# Patient Record
Sex: FEMALE | Race: WHITE | Employment: OTHER | URBAN - METROPOLITAN AREA
[De-identification: names, ages, dates, MRNs, and addresses within clinical notes are randomized per-mention and may not be internally consistent; named-entity substitution may affect disease eponyms.]

---

## 2017-09-15 ENCOUNTER — HOSPITAL ENCOUNTER (OUTPATIENT)
Dept: GENERAL RADIOLOGY | Age: 72
Discharge: HOME | End: 2017-09-15
Payer: MEDICARE

## 2017-09-15 DIAGNOSIS — J18.9 PNEUMONIA, ORGANISM UNSPECIFIED(486): ICD-10-CM

## 2017-09-15 PROCEDURE — 71020 XR CHEST PA LAT: CPT

## 2018-01-26 ENCOUNTER — OFFICE VISIT (OUTPATIENT)
Dept: ORTHOPEDIC SURGERY | Age: 73
End: 2018-01-26

## 2018-01-26 VITALS
OXYGEN SATURATION: 100 % | HEART RATE: 75 BPM | SYSTOLIC BLOOD PRESSURE: 127 MMHG | HEIGHT: 63 IN | BODY MASS INDEX: 34.02 KG/M2 | WEIGHT: 192 LBS | DIASTOLIC BLOOD PRESSURE: 77 MMHG | RESPIRATION RATE: 14 BRPM

## 2018-01-26 DIAGNOSIS — M17.11 PRIMARY OSTEOARTHRITIS OF RIGHT KNEE: Primary | ICD-10-CM

## 2018-01-26 DIAGNOSIS — M21.70 LEG LENGTH DISCREPANCY: ICD-10-CM

## 2018-01-26 RX ORDER — ASPIRIN 325 MG
325 TABLET ORAL DAILY
COMMUNITY
End: 2018-04-18

## 2018-01-26 NOTE — PROGRESS NOTES
Patient: Juan C Hernandez                MRN: [id number]       SSN: [id number]  YOB: 1945        AGE: 67 y.o. SEX: female  Body mass index is 34.01 kg/(m^2). PCP: Austen Sheehan MD  01/26/18    HISTORY: I had the pleasure of reviewing Familia Arnett. She is very friendly with one of my other patients, and she essentially has end-staged arthritis involving the right knee. She has known scoliosis and also a short left leg. We are going to get a 3/4-inch shoe lift for her. He is about one inch short, but we will correct it to within about 1/4-inch. She is complaining of right knee pain. She has pain at night and pain with activities of daily living. Yesterday, she really did not get out of the chair much because it was so painful for her. It was very difficult at [facility], and she is interested in having surgery. She is not interested in injections. The pain can be severe some days. She denies any trauma or falls. She denies myocardial infarction, stroke, diabetes, or respiratory problems. She is a very healthy lady. She has had a hysterectomy and hernia surgeries. PHYSICAL EXAMINATION:  On examination today, she stands in varus. She has about an inch leg length discrepancy being shorter on the left side. The hips rotate nicely. Both feet are we will plan. There is slight evidence of neuropathy distally. There are good pulses. She has no cyanosis or clubbing. Elisa's sign is negative. RADIOGRAPHS:  Review of the x-rays, AP, tunnel, lateral, and skyline, confirm very severe end-staged arthritis of the right knee. IMPRESSION:  My overall impression is end-staged arthritis of the right knee. PLAN:  I do not think injections are going to help her, and she does not want any. I would recommend surgery for her.   We had a lengthy discussion regarding risks and benefits, including but not limited to bleeding, infection, DVT, pulmonary embolism, anesthetic complications, blood loss requiring transfusion, pain, stiffness, and other complications including instability, chronic pain, arthrofibrosis, infection, and DVT. I think she will be a terrific patient. She is very motivated. I look forward to helping her. I would kindly ask for medical clearance. REVIEW OF SYSTEMS:      CON: negative for weight loss, fever  EYE: negative for double vision  ENT: negative for hoarseness  RS:   negative for Tb  GI:    negative for blood in stool  :  negative for blood in urine  Other systems reviewed and noted below. Past Medical History:   Diagnosis Date    Hypertension        History reviewed. No pertinent family history. Current Outpatient Prescriptions   Medication Sig Dispense Refill    aspirin (ASPIRIN) 325 mg tablet Take 325 mg by mouth daily.  DOCOSAHEXANOIC ACID/EPA (FISH OIL PO) Take  by mouth.  CALCIUM-VITAMIN D3 PO Take  by mouth.  MAGNESIUM CARBONATE PO Take  by mouth.  LISINOPRIL PO Take  by mouth. No Known Allergies    Past Surgical History:   Procedure Laterality Date    HX HYSTERECTOMY         Social History     Social History    Marital status:      Spouse name: N/A    Number of children: N/A    Years of education: N/A     Occupational History    Not on file. Social History Main Topics    Smoking status: Never Smoker    Smokeless tobacco: Never Used    Alcohol use 0.5 oz/week     1 Glasses of wine per week      Comment: occasionally    Drug use: No    Sexual activity: Yes     Partners: Male     Other Topics Concern    Not on file     Social History Narrative       Visit Vitals    /77 (BP 1 Location: Left arm, BP Patient Position: Sitting)    Pulse 75    Resp 14    Ht 5' 3\" (1.6 m)    Wt 192 lb (87.1 kg)    SpO2 100%    BMI 34.01 kg/m2         PHYSICAL EXAMINATION:  GENERAL: Alert and oriented x3, in no acute distress, well-developed, well-nourished, afebrile. HEART: No JVD.   EYES: No scleral icterus   NECK: No significant lymphadenopathy   LUNGS: No respiratory compromise or indrawing  ABDOMEN: Soft, non-tender, non-distended. Electronically signed by:  Samara Guerrier MD

## 2018-01-26 NOTE — MR AVS SNAPSHOT
[address] [place] [address], [address] [facility] 
[phone] Patient: Florence Amaro MRN: [id number] DHR:[birth year] Visit Information Date & Time Provider Department Dept. Phone Encounter #  
 1/26/2018  1:30 PM Billy Flores MD [facility] [place] [id number] Upcoming Health Maintenance Date Due Hepatitis C Screening 1945 DTaP/Tdap/Td series (1 - Tdap) 7/7/1966 FOBT Q 1 YEAR AGE 50-75 7/7/1995 ZOSTER VACCINE AGE 60> 5/7/2005 GLAUCOMA SCREENING Q2Y 7/7/2010 Pneumococcal 65+ Low/Medium Risk (1 of 2 - PCV13) 7/7/2010 MEDICARE YEARLY EXAM 7/7/2010 Influenza Age 5 to Adult 8/1/2017 BREAST CANCER SCRN MAMMOGRAM 12/29/2017 Allergies as of 1/26/2018  Review Complete On: 1/26/2018 By: Billy Flores MD  
 No Known Allergies Current Immunizations  Never Reviewed No immunizations on file. Not reviewed this visit You Were Diagnosed With   
  
 Codes Comments Primary osteoarthritis of right knee    -  Primary ICD-10-CM: M17.11 ICD-9-CM: 715.16 Leg length discrepancy     ICD-10-CM: M21.70 ICD-9-CM: 736.81 Vitals BP Pulse Resp Height(growth percentile) Weight(growth percentile) SpO2  
 127/77 (BP 1 Location: Left arm, BP Patient Position: Sitting) 75 14 5' 3\" (1.6 m) 192 lb (87.1 kg) 100% BMI OB Status Smoking Status 34.01 kg/m2 Postmenopausal Never Smoker Vitals History BMI and BSA Data Body Mass Index Body Surface Area 34.01 kg/m 2 1.97 m 2 Your Updated Medication List  
  
   
This list is accurate as of: 1/26/18  2:37 PM.  Always use your most recent med list.  
  
  
  
  
 aspirin 325 mg tablet Commonly known as:  ASPIRIN Take 325 mg by mouth daily. CALCIUM-VITAMIN D3 PO Take  by mouth. FISH OIL PO Take  by mouth. LISINOPRIL PO Take  by mouth. MAGNESIUM CARBONATE PO Take  by mouth. We Performed the Following AMB POC XRAY, KNEE; COMPLETE, 4+ VIEW [52302 CPT(R)] AMB SUPPLY ORDER [[id number] Custom] Comments:  
 Shoe lift for left foot 3/4 inch SCHEDULE SURGERY [EFC0166 Custom] Patient Instructions [address] Phone: [phone] [place]:  
[address] [place], [address] [place]: 
[address] [place], [address] [place]: 
[address] [facility], [address] Introducing [facility]! Davi Moyer introduces [employer] patient portal. Now you can access parts of your medical record, email your doctor's office, and request medication refills online. 1. In your internet browser, go to [email or web address] 2. Click on the First Time User? Click Here link in the Sign In box. You will see the New Member Sign Up page. 3. Enter your [employer] Access Code exactly as it appears below. You will not need to use this code after youve completed the sign-up process. If you do not sign up before the expiration date, you must request a new code. · [employer] Access Code: [email or web address] Expires: 4/26/2018  2:37 PM 
 
4. Enter the last four digits of your Social Security Number (xxxx) and Date of Birth (mm/dd/yyyy) as indicated and click Submit. You will be taken to the next sign-up page. 5. Create a [employer] ID. This will be your [employer] login ID and cannot be changed, so think of one that is secure and easy to remember. 6. Create a [employer] password. You can change your password at any time. 7. Enter your Password Reset Question and Answer. This can be used at a later time if you forget your password. 8. Enter your e-mail address. You will receive e-mail notification when new information is available in [address] 9. Click Sign Up. You can now view and download portions of your medical record. 10. Click the Download Summary menu link to download a portable copy of your medical information. If you have questions, please visit the Frequently Asked Questions section of the [employer] website. Remember, [employer] is NOT to be used for urgent needs. For medical emergencies, dial 911. Now available from your iPhone and Android! Please provide this summary of care documentation to your next provider. Your primary care clinician is listed as Michel Zuniga. If you have any questions after today's visit, please call [phone].

## 2018-01-26 NOTE — PATIENT INSTRUCTIONS
[address]  Phone: [phone]  [place]:   [address]  [facility], [address] [place]:  [address] Vance Seller [address] [place], [facility]3] [address]    [place]:  [facility].    [place], [address]

## 2018-03-14 ENCOUNTER — TELEPHONE (OUTPATIENT)
Dept: ORTHOPEDIC SURGERY | Age: 73
End: 2018-03-14

## 2018-03-14 NOTE — TELEPHONE ENCOUNTER
Patient is requesting a letter for a handicap sign.  Please advise patient when ready for pick-up at [facility] at [phone] or [phone]

## 2018-03-16 ENCOUNTER — TELEPHONE (OUTPATIENT)
Dept: ORTHOPEDIC SURGERY | Age: 73
End: 2018-03-16

## 2018-03-16 NOTE — TELEPHONE ENCOUNTER
Unsure of what type of knee replacement her fried has however knee replacement come in different sizes.

## 2018-03-16 NOTE — TELEPHONE ENCOUNTER
Patient is scheduled for sx in April and states that she had a friend that had a knee replacement and had a lot of trouble with it. She states she was advised the the knee can come in different sizes and to ask if we used \"a smaller implant\". Please advise.

## 2018-03-19 NOTE — TELEPHONE ENCOUNTER
Patient stated that her friend had a knee replacement and they had to go back and replace it with a smaller size. She was just concerned if she needed a smaller size if we had it.

## 2018-03-28 DIAGNOSIS — Z01.818 PREOP EXAMINATION: Primary | ICD-10-CM

## 2018-03-28 DIAGNOSIS — M17.11 PRIMARY OSTEOARTHRITIS OF RIGHT KNEE: ICD-10-CM

## 2018-04-04 NOTE — PROGRESS NOTES
Hubert Leon attended the Joint Replacement Pre-Operative class on 4/4/18. Topics discussed included surgery preparation, what to expect the day of surgery, medications, physical and occupational therapy, and discharge planning. It was discussed that this is considered an elective surgery and that prior to the surgery they need to make decisions such as arranging for help at home once they are discharged. She was given a knee patient education notebook to take home. Opportunity was given to ask questions and phone number of the Orthopaedic Nurse Clinician was given for any questions or concerns that may arise later.

## 2018-04-05 ENCOUNTER — HOSPITAL ENCOUNTER (OUTPATIENT)
Dept: GENERAL RADIOLOGY | Age: 73
Discharge: HOME | End: 2018-04-05
Payer: MEDICARE

## 2018-04-05 ENCOUNTER — HOSPITAL ENCOUNTER (OUTPATIENT)
Dept: PREADMISSION TESTING | Age: 73
Discharge: HOME | End: 2018-04-05
Payer: MEDICARE

## 2018-04-05 DIAGNOSIS — Z01.818 PREOP EXAMINATION: ICD-10-CM

## 2018-04-05 DIAGNOSIS — M17.11 PRIMARY OSTEOARTHRITIS OF RIGHT KNEE: ICD-10-CM

## 2018-04-05 LAB
ABO + RH BLD: NORMAL
ALBUMIN SERPL-MCNC: 4.3 G/DL (ref 3.4–5)
ANION GAP SERPL CALC-SCNC: 9 MMOL/L (ref 3–18)
APPEARANCE UR: CLEAR
APTT PPP: 31.9 SEC (ref 23–36.4)
BASOPHILS # BLD: 0 K/UL (ref 0–0.1)
BASOPHILS NFR BLD: 0 % (ref 0–2)
BILIRUB UR QL: NEGATIVE
BLOOD GROUP ANTIBODIES SERPL: NORMAL
BUN SERPL-MCNC: 11 MG/DL (ref 7–18)
BUN/CREAT SERPL: 20 (ref 12–20)
CALCIUM SERPL-MCNC: 9.1 MG/DL (ref 8.5–10.1)
CHLORIDE SERPL-SCNC: 96 MMOL/L (ref 100–108)
CO2 SERPL-SCNC: 29 MMOL/L (ref 21–32)
COLOR UR: YELLOW
CREAT SERPL-MCNC: 0.56 MG/DL (ref 0.6–1.3)
DIFFERENTIAL METHOD BLD: ABNORMAL
EOSINOPHIL # BLD: 0.1 K/UL (ref 0–0.4)
EOSINOPHIL NFR BLD: 2 % (ref 0–5)
ERYTHROCYTE [DISTWIDTH] IN BLOOD BY AUTOMATED COUNT: 13 % (ref 11.6–14.5)
EST. AVERAGE GLUCOSE BLD GHB EST-MCNC: 105 MG/DL
GLUCOSE SERPL-MCNC: 101 MG/DL (ref 74–99)
GLUCOSE UR STRIP.AUTO-MCNC: NEGATIVE MG/DL
HBA1C MFR BLD: 5.3 % (ref 4.2–5.6)
HCT VFR BLD AUTO: 43.6 % (ref 35–45)
HGB BLD-MCNC: 14.5 G/DL (ref 12–16)
HGB UR QL STRIP: NEGATIVE
INR PPP: 1 (ref 0.8–1.2)
KETONES UR QL STRIP.AUTO: ABNORMAL MG/DL
LEUKOCYTE ESTERASE UR QL STRIP.AUTO: NEGATIVE
LYMPHOCYTES # BLD: 1.4 K/UL (ref 0.9–3.6)
LYMPHOCYTES NFR BLD: 27 % (ref 21–52)
MCH RBC QN AUTO: 28.5 PG (ref 24–34)
MCHC RBC AUTO-ENTMCNC: 33.3 G/DL (ref 31–37)
MCV RBC AUTO: 85.8 FL (ref 74–97)
MONOCYTES # BLD: 0.7 K/UL (ref 0.05–1.2)
MONOCYTES NFR BLD: 13 % (ref 3–10)
NEUTS SEG # BLD: 2.9 K/UL (ref 1.8–8)
NEUTS SEG NFR BLD: 58 % (ref 40–73)
NITRITE UR QL STRIP.AUTO: NEGATIVE
PH UR STRIP: 5 [PH] (ref 5–8)
PLATELET # BLD AUTO: 245 K/UL (ref 135–420)
PMV BLD AUTO: 9.4 FL (ref 9.2–11.8)
POTASSIUM SERPL-SCNC: 3.8 MMOL/L (ref 3.5–5.5)
PROT UR STRIP-MCNC: NEGATIVE MG/DL
PROTHROMBIN TIME: 12.4 SEC (ref 11.5–15.2)
RBC # BLD AUTO: 5.08 M/UL (ref 4.2–5.3)
SODIUM SERPL-SCNC: 134 MMOL/L (ref 136–145)
SP GR UR REFRACTOMETRY: 1.01 (ref 1–1.03)
SPECIMEN EXP DATE BLD: NORMAL
UROBILINOGEN UR QL STRIP.AUTO: 0.2 EU/DL (ref 0.2–1)
WBC # BLD AUTO: 5.1 K/UL (ref 4.6–13.2)

## 2018-04-05 PROCEDURE — 83036 HEMOGLOBIN GLYCOSYLATED A1C: CPT | Performed by: PHYSICIAN ASSISTANT

## 2018-04-05 PROCEDURE — 80048 BASIC METABOLIC PNL TOTAL CA: CPT | Performed by: PHYSICIAN ASSISTANT

## 2018-04-05 PROCEDURE — 86900 BLOOD TYPING SEROLOGIC ABO: CPT | Performed by: PHYSICIAN ASSISTANT

## 2018-04-05 PROCEDURE — 85025 COMPLETE CBC W/AUTO DIFF WBC: CPT | Performed by: PHYSICIAN ASSISTANT

## 2018-04-05 PROCEDURE — 82040 ASSAY OF SERUM ALBUMIN: CPT | Performed by: PHYSICIAN ASSISTANT

## 2018-04-05 PROCEDURE — 85610 PROTHROMBIN TIME: CPT | Performed by: PHYSICIAN ASSISTANT

## 2018-04-05 PROCEDURE — 81003 URINALYSIS AUTO W/O SCOPE: CPT | Performed by: PHYSICIAN ASSISTANT

## 2018-04-05 PROCEDURE — 93005 ELECTROCARDIOGRAM TRACING: CPT

## 2018-04-05 PROCEDURE — 71046 X-RAY EXAM CHEST 2 VIEWS: CPT

## 2018-04-05 PROCEDURE — 87086 URINE CULTURE/COLONY COUNT: CPT | Performed by: PHYSICIAN ASSISTANT

## 2018-04-05 PROCEDURE — 36415 COLL VENOUS BLD VENIPUNCTURE: CPT | Performed by: PHYSICIAN ASSISTANT

## 2018-04-05 PROCEDURE — 85730 THROMBOPLASTIN TIME PARTIAL: CPT | Performed by: PHYSICIAN ASSISTANT

## 2018-04-06 LAB
ATRIAL RATE: 79 BPM
BACTERIA SPEC CULT: NORMAL
CALCULATED P AXIS, ECG09: 31 DEGREES
CALCULATED R AXIS, ECG10: 43 DEGREES
CALCULATED T AXIS, ECG11: 36 DEGREES
DIAGNOSIS, 93000: NORMAL
P-R INTERVAL, ECG05: 122 MS
Q-T INTERVAL, ECG07: 348 MS
QRS DURATION, ECG06: 68 MS
QTC CALCULATION (BEZET), ECG08: 399 MS
SERVICE CMNT-IMP: NORMAL
VENTRICULAR RATE, ECG03: 79 BPM

## 2018-04-10 ENCOUNTER — OFFICE VISIT (OUTPATIENT)
Dept: ORTHOPEDIC SURGERY | Facility: CLINIC | Age: 73
End: 2018-04-10

## 2018-04-10 DIAGNOSIS — M25.561 RIGHT KNEE PAIN, UNSPECIFIED CHRONICITY: Primary | ICD-10-CM

## 2018-04-11 ENCOUNTER — OFFICE VISIT (OUTPATIENT)
Dept: ORTHOPEDIC SURGERY | Facility: CLINIC | Age: 73
End: 2018-04-11

## 2018-04-11 VITALS
HEART RATE: 78 BPM | SYSTOLIC BLOOD PRESSURE: 133 MMHG | TEMPERATURE: 97.1 F | OXYGEN SATURATION: 99 % | HEIGHT: 63 IN | WEIGHT: 125 LBS | RESPIRATION RATE: 16 BRPM | BODY MASS INDEX: 22.15 KG/M2 | DIASTOLIC BLOOD PRESSURE: 77 MMHG

## 2018-04-11 DIAGNOSIS — M17.11 PRIMARY OSTEOARTHRITIS OF RIGHT KNEE: Primary | ICD-10-CM

## 2018-04-11 RX ORDER — ACETAMINOPHEN 325 MG/1
1000 TABLET ORAL ONCE
Status: CANCELLED | OUTPATIENT
Start: 2018-04-11 | End: 2018-04-11

## 2018-04-11 RX ORDER — WARFARIN 1 MG/1
10 TABLET ORAL ONCE
Status: CANCELLED | OUTPATIENT
Start: 2018-04-11 | End: 2018-04-11

## 2018-04-11 RX ORDER — CELECOXIB 100 MG/1
200 CAPSULE ORAL ONCE
Status: CANCELLED | OUTPATIENT
Start: 2018-04-11 | End: 2018-04-11

## 2018-04-11 RX ORDER — PREGABALIN 25 MG/1
50 CAPSULE ORAL ONCE
Status: CANCELLED | OUTPATIENT
Start: 2018-04-11 | End: 2018-04-11

## 2018-04-11 NOTE — PATIENT INSTRUCTIONS
Patient: Brittany Duarte                MRN: [id number]       SSN: [id number]  YOB: 1945        AGE: 67 y.o. SEX: female  Body mass index is 22.14 kg/(m^2). 04/11/18    DO:  1:  Sit with the leg out straight 5-10 min every hour while awake. Keep the toes pointed       up towards the anthony. 2.  Bend your knee 5-10 min every hour. Bend it to the point of pain and hold it for 5-10       Min. 3.  ICE your knee 20 min every hour    DO NOT:    1. Do not place anything under your knee to prop it up  2. Do not sit in the recliner chair.

## 2018-04-11 NOTE — H&P
[address] [facility], [address] [facility]  [phone]           HISTORY & PHYSICAL      Patient: Nichol Irvin                MRN: [id number]       SSN: [id number]  YOB: 1945        AGE: 67 y.o. SEX: female  Body mass index is 22.14 kg/(m^2). PCP: Yenni Walker MD  04/11/18      CC: right knee end stage OA  Problem List Items Addressed This Visit     None      Visit Diagnoses     Primary osteoarthritis of right knee    -  Primary            HPI:  The patient is a pleasant 67 y.o. whom has end stage OA of their Right knee and has failed conservative treatment including but not limited to NSAIDS, cortisone injections, viscosupplementation, PT, and pain medicine. Due to the current findings and affected activities of daily living, surgical intervention is indicated. The alternatives, risks, complications, as well as expected outcome were discussed. These include but are not limited to infection, blood loss, need for blood transfusion, neurovascular damage, DVT, PE,  post-op stiffness and pain, leg length discrepancy, dislocation, anesthetic complications, prothesis longevity, need for more surgery, MI, stroke, and even death. The patient understands and wishes to proceed with surgery. Past Medical History:   Diagnosis Date    Hypertension          Current Outpatient Prescriptions:     aspirin (ASPIRIN) 325 mg tablet, Take 325 mg by mouth daily. , Disp: , Rfl:     CALCIUM-VITAMIN D3 PO, Take  by mouth., Disp: , Rfl:     MAGNESIUM CARBONATE PO, Take  by mouth., Disp: , Rfl:     LISINOPRIL PO, Take  by mouth., Disp: , Rfl:     DOCOSAHEXANOIC ACID/EPA (FISH OIL PO), Take  by mouth., Disp: , Rfl:     No Known Allergies    Social History     Social History    Marital status:      Spouse name: N/A    Number of children: N/A    Years of education: N/A     Occupational History    Not on file.      Social History Main Topics    Smoking status: Never Smoker    Smokeless tobacco: Never Used    Alcohol use 0.5 oz/week     1 Glasses of wine per week      Comment: occasionally    Drug use: No    Sexual activity: Yes     Partners: Male     Other Topics Concern    Not on file     Social History Narrative       Past Surgical History:   Procedure Laterality Date    HX HYSTERECTOMY         Family History:  Non-contributory. PE:  Visit Vitals    /77    Pulse 78    Temp 97.1 °F (36.2 °C)    Resp 16    Ht 5' 3\" (1.6 m)    Wt 125 lb (56.7 kg)    SpO2 99%    BMI 22.14 kg/m2     A&O X3, NAD, well develop, well nourished  Heart: S1-S2, rrr  Lungs: CTA bilat  Abd: soft, nt, nt, + bs in all quadrants  Ext:  Pos distal pulses to DP, PT      X-ray: right knee shows end stage OA    Labs: labs were reviewed and wnl.  ua neg    A:  Right  knee end stage OA    P:  At this point we will move forward with surgery. Again, the alternatives, risks, complications, as well as expected outcome were discussed and the patient wishes to proceed with surgery. Pt has been instructed to stop aspirin, nsaids, rheumatologic medications and blood thinners. They have also been instructed to continue on any heart and bp meds and to take them the morning of surgery with sips of water. The patient will require in patient admission due to above stated medical conditions as well the the surgical challenges given the anatomy and bone quality.          Nara Godfrey

## 2018-04-12 ENCOUNTER — HOSPITAL ENCOUNTER (OUTPATIENT)
Dept: NON INVASIVE DIAGNOSTICS | Age: 73
Discharge: HOME | End: 2018-04-12
Attending: INTERNAL MEDICINE
Payer: MEDICARE

## 2018-04-12 DIAGNOSIS — I51.0 ACQUIRED CARDIAC SEPTAL DEFECT: ICD-10-CM

## 2018-04-12 PROCEDURE — 93306 TTE W/DOPPLER COMPLETE: CPT

## 2018-04-12 RX ORDER — BISMUTH SUBSALICYLATE 262 MG
1 TABLET,CHEWABLE ORAL DAILY
COMMUNITY
End: 2019-09-05

## 2018-04-16 ENCOUNTER — ANESTHESIA EVENT (OUTPATIENT)
Dept: SURGERY | Age: 73
DRG: 470 | End: 2018-04-16
Payer: MEDICARE

## 2018-04-17 ENCOUNTER — ANESTHESIA (OUTPATIENT)
Dept: SURGERY | Age: 73
DRG: 470 | End: 2018-04-17
Payer: MEDICARE

## 2018-04-17 ENCOUNTER — APPOINTMENT (OUTPATIENT)
Dept: GENERAL RADIOLOGY | Age: 73
DRG: 470 | End: 2018-04-17
Attending: ORTHOPAEDIC SURGERY
Payer: MEDICARE

## 2018-04-17 ENCOUNTER — HOSPITAL ENCOUNTER (INPATIENT)
Age: 73
LOS: 1 days | Discharge: HOME HEALTH | DRG: 470 | End: 2018-04-18
Attending: ORTHOPAEDIC SURGERY | Admitting: ORTHOPAEDIC SURGERY
Payer: MEDICARE

## 2018-04-17 DIAGNOSIS — M17.10 ARTHRITIS OF KNEE: Primary | ICD-10-CM

## 2018-04-17 LAB — GLUCOSE BLD STRIP.AUTO-MCNC: 101 MG/DL (ref 70–110)

## 2018-04-17 PROCEDURE — 77030008683 HC TU ET CUF COVD -A: Performed by: ANESTHESIOLOGY

## 2018-04-17 PROCEDURE — 74011250636 HC RX REV CODE- 250/636: Performed by: PHYSICIAN ASSISTANT

## 2018-04-17 PROCEDURE — 77030029494 HC CLD THER UNIT ICMN DJOR -B: Performed by: ORTHOPAEDIC SURGERY

## 2018-04-17 PROCEDURE — 74011000250 HC RX REV CODE- 250: Performed by: ORTHOPAEDIC SURGERY

## 2018-04-17 PROCEDURE — 77030011640 HC PAD GRND REM COVD -A: Performed by: ORTHOPAEDIC SURGERY

## 2018-04-17 PROCEDURE — 74011000250 HC RX REV CODE- 250: Performed by: NURSE ANESTHETIST, CERTIFIED REGISTERED

## 2018-04-17 PROCEDURE — 74011250637 HC RX REV CODE- 250/637

## 2018-04-17 PROCEDURE — 77030002933 HC SUT MCRYL J&J -A: Performed by: ORTHOPAEDIC SURGERY

## 2018-04-17 PROCEDURE — 77030018883 HC BLD SAW SAG4 STRY -B: Performed by: ORTHOPAEDIC SURGERY

## 2018-04-17 PROCEDURE — 77030013708 HC HNDPC SUC IRR PULS STRY –B: Performed by: ORTHOPAEDIC SURGERY

## 2018-04-17 PROCEDURE — 77030016544 HC BLD SAW RECIP1 STRY -B: Performed by: ORTHOPAEDIC SURGERY

## 2018-04-17 PROCEDURE — 74011250637 HC RX REV CODE- 250/637: Performed by: NURSE ANESTHETIST, CERTIFIED REGISTERED

## 2018-04-17 PROCEDURE — 77030019605: Performed by: ORTHOPAEDIC SURGERY

## 2018-04-17 PROCEDURE — 77030020753 HC CUF TRNQT 1BLA STRY -B: Performed by: ORTHOPAEDIC SURGERY

## 2018-04-17 PROCEDURE — 97116 GAIT TRAINING THERAPY: CPT

## 2018-04-17 PROCEDURE — 74011250636 HC RX REV CODE- 250/636

## 2018-04-17 PROCEDURE — 64450 NJX AA&/STRD OTHER PN/BRANCH: CPT | Performed by: ANESTHESIOLOGY

## 2018-04-17 PROCEDURE — 77030031139 HC SUT VCRL2 J&J -A: Performed by: ORTHOPAEDIC SURGERY

## 2018-04-17 PROCEDURE — 76210000017 HC OR PH I REC 1.5 TO 2 HR: Performed by: ORTHOPAEDIC SURGERY

## 2018-04-17 PROCEDURE — 97161 PT EVAL LOW COMPLEX 20 MIN: CPT

## 2018-04-17 PROCEDURE — 74011250637 HC RX REV CODE- 250/637: Performed by: PHYSICIAN ASSISTANT

## 2018-04-17 PROCEDURE — 74011250636 HC RX REV CODE- 250/636: Performed by: ORTHOPAEDIC SURGERY

## 2018-04-17 PROCEDURE — 74011000258 HC RX REV CODE- 258

## 2018-04-17 PROCEDURE — 77030026438 HC STYL ET INTUB CARD -A: Performed by: ANESTHESIOLOGY

## 2018-04-17 PROCEDURE — 74011000250 HC RX REV CODE- 250

## 2018-04-17 PROCEDURE — 0SRC0J9 REPLACEMENT OF RIGHT KNEE JOINT WITH SYNTHETIC SUBSTITUTE, CEMENTED, OPEN APPROACH: ICD-10-PCS | Performed by: ORTHOPAEDIC SURGERY

## 2018-04-17 PROCEDURE — 76010000153 HC OR TIME 1.5 TO 2 HR: Performed by: ORTHOPAEDIC SURGERY

## 2018-04-17 PROCEDURE — 74011250636 HC RX REV CODE- 250/636: Performed by: NURSE ANESTHETIST, CERTIFIED REGISTERED

## 2018-04-17 PROCEDURE — 77030003601 HC NDL NRV BLK BBMI -A: Performed by: ORTHOPAEDIC SURGERY

## 2018-04-17 PROCEDURE — 77030020782 HC GWN BAIR PAWS FLX 3M -B: Performed by: ORTHOPAEDIC SURGERY

## 2018-04-17 PROCEDURE — 82962 GLUCOSE BLOOD TEST: CPT

## 2018-04-17 PROCEDURE — 77030012411 HC DRN WND CARD -A: Performed by: ORTHOPAEDIC SURGERY

## 2018-04-17 PROCEDURE — C1713 ANCHOR/SCREW BN/BN,TIS/BN: HCPCS | Performed by: ORTHOPAEDIC SURGERY

## 2018-04-17 PROCEDURE — C1776 JOINT DEVICE (IMPLANTABLE): HCPCS | Performed by: ORTHOPAEDIC SURGERY

## 2018-04-17 PROCEDURE — 76942 ECHO GUIDE FOR BIOPSY: CPT | Performed by: ANESTHESIOLOGY

## 2018-04-17 PROCEDURE — 76060000034 HC ANESTHESIA 1.5 TO 2 HR: Performed by: ORTHOPAEDIC SURGERY

## 2018-04-17 PROCEDURE — 77030012935 HC DRSG AQUACEL BMS -B: Performed by: ORTHOPAEDIC SURGERY

## 2018-04-17 PROCEDURE — 77030019557 HC ELECTRD VES SEAL MEDT -F: Performed by: ORTHOPAEDIC SURGERY

## 2018-04-17 PROCEDURE — 73560 X-RAY EXAM OF KNEE 1 OR 2: CPT

## 2018-04-17 PROCEDURE — 74011250637 HC RX REV CODE- 250/637: Performed by: ORTHOPAEDIC SURGERY

## 2018-04-17 PROCEDURE — 77030010785: Performed by: ORTHOPAEDIC SURGERY

## 2018-04-17 PROCEDURE — 77030018719 HC DRSG PTCH ANTIMIC J&J -A: Performed by: ORTHOPAEDIC SURGERY

## 2018-04-17 PROCEDURE — 77030008467 HC STPLR SKN COVD -B: Performed by: ORTHOPAEDIC SURGERY

## 2018-04-17 PROCEDURE — 77030016547 HC BLD SAW SAG1 STRY -B: Performed by: ORTHOPAEDIC SURGERY

## 2018-04-17 PROCEDURE — C9290 INJ, BUPIVACAINE LIPOSOME: HCPCS | Performed by: PHYSICIAN ASSISTANT

## 2018-04-17 PROCEDURE — 65270000029 HC RM PRIVATE

## 2018-04-17 PROCEDURE — 97530 THERAPEUTIC ACTIVITIES: CPT

## 2018-04-17 PROCEDURE — 74011000258 HC RX REV CODE- 258: Performed by: ORTHOPAEDIC SURGERY

## 2018-04-17 PROCEDURE — 3E0T3BZ INTRODUCTION OF ANESTHETIC AGENT INTO PERIPHERAL NERVES AND PLEXI, PERCUTANEOUS APPROACH: ICD-10-PCS | Performed by: ANESTHESIOLOGY

## 2018-04-17 PROCEDURE — 77030003029 HC SUT VCRL J&J -B: Performed by: ORTHOPAEDIC SURGERY

## 2018-04-17 PROCEDURE — 77030018836 HC SOL IRR NACL ICUM -A: Performed by: ORTHOPAEDIC SURGERY

## 2018-04-17 DEVICE — BASEPLT TIB UNIV TRIATHLN 3 --: Type: IMPLANTABLE DEVICE | Site: KNEE | Status: FUNCTIONAL

## 2018-04-17 DEVICE — COMPONENT KNEE CEM X3 TRIATHLON: Type: IMPLANTABLE DEVICE | Site: KNEE | Status: FUNCTIONAL

## 2018-04-17 DEVICE — PAT ASYM TRITHLON X3 32X10MM -- TRIATHLON ASYMMETRIC X3: Type: IMPLANTABLE DEVICE | Site: KNEE | Status: FUNCTIONAL

## 2018-04-17 DEVICE — COMPNT FEM PS CEM TRIATHLN 3 R --: Type: IMPLANTABLE DEVICE | Site: KNEE | Status: FUNCTIONAL

## 2018-04-17 DEVICE — INSERT TIB SZ 3 THK9MM KNEE X3 TOT STBL + TRIATHLON: Type: IMPLANTABLE DEVICE | Site: KNEE | Status: FUNCTIONAL

## 2018-04-17 DEVICE — CEMENT BNE 20ML 41GM FULL DOSE PMMA W/ TOBRA M VISC RADPQ: Type: IMPLANTABLE DEVICE | Site: KNEE | Status: FUNCTIONAL

## 2018-04-17 RX ORDER — LANOLIN ALCOHOL/MO/W.PET/CERES
1 CREAM (GRAM) TOPICAL 2 TIMES DAILY WITH MEALS
Status: DISCONTINUED | OUTPATIENT
Start: 2018-04-17 | End: 2018-04-18 | Stop reason: HOSPADM

## 2018-04-17 RX ORDER — SODIUM CHLORIDE, SODIUM LACTATE, POTASSIUM CHLORIDE, CALCIUM CHLORIDE 600; 310; 30; 20 MG/100ML; MG/100ML; MG/100ML; MG/100ML
INJECTION, SOLUTION INTRAVENOUS
Status: DISCONTINUED | OUTPATIENT
Start: 2018-04-17 | End: 2018-04-17 | Stop reason: HOSPADM

## 2018-04-17 RX ORDER — FAMOTIDINE 20 MG/1
20 TABLET, FILM COATED ORAL ONCE
Status: COMPLETED | OUTPATIENT
Start: 2018-04-17 | End: 2018-04-17

## 2018-04-17 RX ORDER — PREGABALIN 25 MG/1
25 CAPSULE ORAL 2 TIMES DAILY
Status: DISCONTINUED | OUTPATIENT
Start: 2018-04-17 | End: 2018-04-17

## 2018-04-17 RX ORDER — SCOLOPAMINE TRANSDERMAL SYSTEM 1 MG/1
PATCH, EXTENDED RELEASE TRANSDERMAL AS NEEDED
Status: DISCONTINUED | OUTPATIENT
Start: 2018-04-17 | End: 2018-04-17 | Stop reason: HOSPADM

## 2018-04-17 RX ORDER — OXYCODONE HYDROCHLORIDE 5 MG/1
5-10 TABLET ORAL
Status: DISCONTINUED | OUTPATIENT
Start: 2018-04-17 | End: 2018-04-18 | Stop reason: HOSPADM

## 2018-04-17 RX ORDER — EPHEDRINE SULFATE 50 MG/ML
INJECTION, SOLUTION INTRAVENOUS AS NEEDED
Status: DISCONTINUED | OUTPATIENT
Start: 2018-04-17 | End: 2018-04-17 | Stop reason: HOSPADM

## 2018-04-17 RX ORDER — DEXTROSE 50 % IN WATER (D50W) INTRAVENOUS SYRINGE
25-50 AS NEEDED
Status: DISCONTINUED | OUTPATIENT
Start: 2018-04-17 | End: 2018-04-17 | Stop reason: HOSPADM

## 2018-04-17 RX ORDER — NALOXONE HYDROCHLORIDE 0.4 MG/ML
0.4 INJECTION, SOLUTION INTRAMUSCULAR; INTRAVENOUS; SUBCUTANEOUS AS NEEDED
Status: DISCONTINUED | OUTPATIENT
Start: 2018-04-17 | End: 2018-04-18 | Stop reason: HOSPADM

## 2018-04-17 RX ORDER — CELECOXIB 100 MG/1
200 CAPSULE ORAL ONCE
Status: COMPLETED | OUTPATIENT
Start: 2018-04-17 | End: 2018-04-17

## 2018-04-17 RX ORDER — NALBUPHINE HYDROCHLORIDE 10 MG/ML
5 INJECTION, SOLUTION INTRAMUSCULAR; INTRAVENOUS; SUBCUTANEOUS
Status: DISCONTINUED | OUTPATIENT
Start: 2018-04-17 | End: 2018-04-17 | Stop reason: HOSPADM

## 2018-04-17 RX ORDER — SODIUM CHLORIDE 9 MG/ML
75 INJECTION, SOLUTION INTRAVENOUS CONTINUOUS
Status: DISPENSED | OUTPATIENT
Start: 2018-04-17 | End: 2018-04-18

## 2018-04-17 RX ORDER — ONDANSETRON 2 MG/ML
INJECTION INTRAMUSCULAR; INTRAVENOUS AS NEEDED
Status: DISCONTINUED | OUTPATIENT
Start: 2018-04-17 | End: 2018-04-17 | Stop reason: HOSPADM

## 2018-04-17 RX ORDER — ROCURONIUM BROMIDE 10 MG/ML
INJECTION, SOLUTION INTRAVENOUS AS NEEDED
Status: DISCONTINUED | OUTPATIENT
Start: 2018-04-17 | End: 2018-04-17 | Stop reason: HOSPADM

## 2018-04-17 RX ORDER — GUAIFENESIN 100 MG/5ML
243 LIQUID (ML) ORAL 2 TIMES DAILY
Status: DISCONTINUED | OUTPATIENT
Start: 2018-04-18 | End: 2018-04-18 | Stop reason: HOSPADM

## 2018-04-17 RX ORDER — SODIUM CHLORIDE 0.9 % (FLUSH) 0.9 %
5-10 SYRINGE (ML) INJECTION EVERY 8 HOURS
Status: DISCONTINUED | OUTPATIENT
Start: 2018-04-17 | End: 2018-04-18 | Stop reason: HOSPADM

## 2018-04-17 RX ORDER — BUPIVACAINE HYDROCHLORIDE 5 MG/ML
INJECTION, SOLUTION EPIDURAL; INTRACAUDAL AS NEEDED
Status: DISCONTINUED | OUTPATIENT
Start: 2018-04-17 | End: 2018-04-17 | Stop reason: HOSPADM

## 2018-04-17 RX ORDER — PROMETHAZINE HYDROCHLORIDE 25 MG/ML
INJECTION, SOLUTION INTRAMUSCULAR; INTRAVENOUS AS NEEDED
Status: DISCONTINUED | OUTPATIENT
Start: 2018-04-17 | End: 2018-04-17 | Stop reason: HOSPADM

## 2018-04-17 RX ORDER — LIDOCAINE HYDROCHLORIDE 20 MG/ML
INJECTION, SOLUTION EPIDURAL; INFILTRATION; INTRACAUDAL; PERINEURAL AS NEEDED
Status: DISCONTINUED | OUTPATIENT
Start: 2018-04-17 | End: 2018-04-17 | Stop reason: HOSPADM

## 2018-04-17 RX ORDER — FENTANYL CITRATE 50 UG/ML
50 INJECTION, SOLUTION INTRAMUSCULAR; INTRAVENOUS AS NEEDED
Status: DISCONTINUED | OUTPATIENT
Start: 2018-04-17 | End: 2018-04-17 | Stop reason: HOSPADM

## 2018-04-17 RX ORDER — CEFAZOLIN SODIUM IN 0.9 % NACL 2 G/100 ML
PLASTIC BAG, INJECTION (ML) INTRAVENOUS AS NEEDED
Status: DISCONTINUED | OUTPATIENT
Start: 2018-04-17 | End: 2018-04-17 | Stop reason: HOSPADM

## 2018-04-17 RX ORDER — CELECOXIB 100 MG/1
200 CAPSULE ORAL EVERY 12 HOURS
Status: DISCONTINUED | OUTPATIENT
Start: 2018-04-17 | End: 2018-04-18 | Stop reason: HOSPADM

## 2018-04-17 RX ORDER — ACETAMINOPHEN 500 MG
1000 TABLET ORAL ONCE
Status: COMPLETED | OUTPATIENT
Start: 2018-04-17 | End: 2018-04-17

## 2018-04-17 RX ORDER — CEFAZOLIN SODIUM 2 G/50ML
2 SOLUTION INTRAVENOUS
Status: DISCONTINUED | OUTPATIENT
Start: 2018-04-17 | End: 2018-04-17 | Stop reason: HOSPADM

## 2018-04-17 RX ORDER — SODIUM CHLORIDE, SODIUM LACTATE, POTASSIUM CHLORIDE, CALCIUM CHLORIDE 600; 310; 30; 20 MG/100ML; MG/100ML; MG/100ML; MG/100ML
50 INJECTION, SOLUTION INTRAVENOUS CONTINUOUS
Status: DISCONTINUED | OUTPATIENT
Start: 2018-04-17 | End: 2018-04-17 | Stop reason: HOSPADM

## 2018-04-17 RX ORDER — VANCOMYCIN HYDROCHLORIDE 1 G/20ML
INJECTION, POWDER, LYOPHILIZED, FOR SOLUTION INTRAVENOUS AS NEEDED
Status: DISCONTINUED | OUTPATIENT
Start: 2018-04-17 | End: 2018-04-17 | Stop reason: HOSPADM

## 2018-04-17 RX ORDER — TALC
250 POWDER (GRAM) TOPICAL 2 TIMES DAILY
Status: DISCONTINUED | OUTPATIENT
Start: 2018-04-17 | End: 2018-04-18 | Stop reason: HOSPADM

## 2018-04-17 RX ORDER — EPINEPHRINE 1 MG/ML
INJECTION, SOLUTION, CONCENTRATE INTRAVENOUS AS NEEDED
Status: DISCONTINUED | OUTPATIENT
Start: 2018-04-17 | End: 2018-04-17 | Stop reason: HOSPADM

## 2018-04-17 RX ORDER — ZOLPIDEM TARTRATE 5 MG/1
5 TABLET ORAL
Status: DISCONTINUED | OUTPATIENT
Start: 2018-04-17 | End: 2018-04-18 | Stop reason: HOSPADM

## 2018-04-17 RX ORDER — ONDANSETRON 2 MG/ML
4 INJECTION INTRAMUSCULAR; INTRAVENOUS
Status: DISCONTINUED | OUTPATIENT
Start: 2018-04-17 | End: 2018-04-18 | Stop reason: HOSPADM

## 2018-04-17 RX ORDER — MIDAZOLAM HYDROCHLORIDE 1 MG/ML
2 INJECTION, SOLUTION INTRAMUSCULAR; INTRAVENOUS
Status: DISCONTINUED | OUTPATIENT
Start: 2018-04-17 | End: 2018-04-17 | Stop reason: HOSPADM

## 2018-04-17 RX ORDER — ACETAMINOPHEN 500 MG
1000 TABLET ORAL 4 TIMES DAILY
Status: DISCONTINUED | OUTPATIENT
Start: 2018-04-17 | End: 2018-04-18 | Stop reason: HOSPADM

## 2018-04-17 RX ORDER — SODIUM CHLORIDE, SODIUM LACTATE, POTASSIUM CHLORIDE, CALCIUM CHLORIDE 600; 310; 30; 20 MG/100ML; MG/100ML; MG/100ML; MG/100ML
100 INJECTION, SOLUTION INTRAVENOUS CONTINUOUS
Status: DISCONTINUED | OUTPATIENT
Start: 2018-04-17 | End: 2018-04-17 | Stop reason: HOSPADM

## 2018-04-17 RX ORDER — MORPHINE SULFATE 2 MG/ML
4 INJECTION, SOLUTION INTRAMUSCULAR; INTRAVENOUS
Status: DISCONTINUED | OUTPATIENT
Start: 2018-04-17 | End: 2018-04-17 | Stop reason: HOSPADM

## 2018-04-17 RX ORDER — LIDOCAINE HYDROCHLORIDE 10 MG/ML
0.1 INJECTION, SOLUTION EPIDURAL; INFILTRATION; INTRACAUDAL; PERINEURAL AS NEEDED
Status: DISCONTINUED | OUTPATIENT
Start: 2018-04-17 | End: 2018-04-17 | Stop reason: HOSPADM

## 2018-04-17 RX ORDER — MORPHINE SULFATE 10 MG/ML
INJECTION, SOLUTION INTRAMUSCULAR; INTRAVENOUS AS NEEDED
Status: DISCONTINUED | OUTPATIENT
Start: 2018-04-17 | End: 2018-04-17 | Stop reason: HOSPADM

## 2018-04-17 RX ORDER — THERA TABS 400 MCG
1 TAB ORAL DAILY
Status: DISCONTINUED | OUTPATIENT
Start: 2018-04-18 | End: 2018-04-18 | Stop reason: HOSPADM

## 2018-04-17 RX ORDER — WARFARIN 10 MG/1
10 TABLET ORAL ONCE
Status: COMPLETED | OUTPATIENT
Start: 2018-04-17 | End: 2018-04-17

## 2018-04-17 RX ORDER — MAGNESIUM SULFATE 100 %
4 CRYSTALS MISCELLANEOUS AS NEEDED
Status: DISCONTINUED | OUTPATIENT
Start: 2018-04-17 | End: 2018-04-17 | Stop reason: HOSPADM

## 2018-04-17 RX ORDER — CEFAZOLIN SODIUM 2 G/50ML
2 SOLUTION INTRAVENOUS EVERY 8 HOURS
Status: COMPLETED | OUTPATIENT
Start: 2018-04-17 | End: 2018-04-18

## 2018-04-17 RX ORDER — KETOROLAC TROMETHAMINE 30 MG/ML
INJECTION, SOLUTION INTRAMUSCULAR; INTRAVENOUS AS NEEDED
Status: DISCONTINUED | OUTPATIENT
Start: 2018-04-17 | End: 2018-04-17 | Stop reason: HOSPADM

## 2018-04-17 RX ORDER — DIPHENHYDRAMINE HYDROCHLORIDE 50 MG/ML
12.5 INJECTION, SOLUTION INTRAMUSCULAR; INTRAVENOUS
Status: DISCONTINUED | OUTPATIENT
Start: 2018-04-17 | End: 2018-04-18 | Stop reason: HOSPADM

## 2018-04-17 RX ORDER — ROPIVACAINE HYDROCHLORIDE 2 MG/ML
30 INJECTION, SOLUTION EPIDURAL; INFILTRATION; PERINEURAL
Status: COMPLETED | OUTPATIENT
Start: 2018-04-17 | End: 2018-04-17

## 2018-04-17 RX ORDER — NEOSTIGMINE METHYLSULFATE 5 MG/5 ML
SYRINGE (ML) INTRAVENOUS AS NEEDED
Status: DISCONTINUED | OUTPATIENT
Start: 2018-04-17 | End: 2018-04-17 | Stop reason: HOSPADM

## 2018-04-17 RX ORDER — SODIUM CHLORIDE 0.9 % (FLUSH) 0.9 %
5-10 SYRINGE (ML) INJECTION AS NEEDED
Status: DISCONTINUED | OUTPATIENT
Start: 2018-04-17 | End: 2018-04-17 | Stop reason: HOSPADM

## 2018-04-17 RX ORDER — DOCUSATE SODIUM 100 MG/1
100 CAPSULE, LIQUID FILLED ORAL 2 TIMES DAILY
Status: DISCONTINUED | OUTPATIENT
Start: 2018-04-17 | End: 2018-04-18 | Stop reason: HOSPADM

## 2018-04-17 RX ORDER — NALOXONE HYDROCHLORIDE 0.4 MG/ML
INJECTION, SOLUTION INTRAMUSCULAR; INTRAVENOUS; SUBCUTANEOUS AS NEEDED
Status: DISCONTINUED | OUTPATIENT
Start: 2018-04-17 | End: 2018-04-17 | Stop reason: HOSPADM

## 2018-04-17 RX ORDER — POLYMYXIN B 500000 [USP'U]/1
INJECTION, POWDER, LYOPHILIZED, FOR SOLUTION INTRAMUSCULAR; INTRATHECAL; INTRAVENOUS; OPHTHALMIC AS NEEDED
Status: DISCONTINUED | OUTPATIENT
Start: 2018-04-17 | End: 2018-04-17 | Stop reason: HOSPADM

## 2018-04-17 RX ORDER — GLYCOPYRROLATE 0.2 MG/ML
INJECTION INTRAMUSCULAR; INTRAVENOUS AS NEEDED
Status: DISCONTINUED | OUTPATIENT
Start: 2018-04-17 | End: 2018-04-17 | Stop reason: HOSPADM

## 2018-04-17 RX ORDER — PREGABALIN 50 MG/1
50 CAPSULE ORAL ONCE
Status: COMPLETED | OUTPATIENT
Start: 2018-04-17 | End: 2018-04-17

## 2018-04-17 RX ORDER — SODIUM CHLORIDE 0.9 % (FLUSH) 0.9 %
5-10 SYRINGE (ML) INJECTION AS NEEDED
Status: DISCONTINUED | OUTPATIENT
Start: 2018-04-17 | End: 2018-04-18 | Stop reason: HOSPADM

## 2018-04-17 RX ORDER — FENTANYL CITRATE 50 UG/ML
100 INJECTION, SOLUTION INTRAMUSCULAR; INTRAVENOUS
Status: DISCONTINUED | OUTPATIENT
Start: 2018-04-17 | End: 2018-04-17 | Stop reason: HOSPADM

## 2018-04-17 RX ORDER — SUCCINYLCHOLINE CHLORIDE 20 MG/ML
INJECTION INTRAMUSCULAR; INTRAVENOUS AS NEEDED
Status: DISCONTINUED | OUTPATIENT
Start: 2018-04-17 | End: 2018-04-17 | Stop reason: HOSPADM

## 2018-04-17 RX ORDER — ONDANSETRON 2 MG/ML
4 INJECTION INTRAMUSCULAR; INTRAVENOUS ONCE
Status: DISCONTINUED | OUTPATIENT
Start: 2018-04-17 | End: 2018-04-17 | Stop reason: HOSPADM

## 2018-04-17 RX ORDER — PROPOFOL 10 MG/ML
INJECTION, EMULSION INTRAVENOUS AS NEEDED
Status: DISCONTINUED | OUTPATIENT
Start: 2018-04-17 | End: 2018-04-17 | Stop reason: HOSPADM

## 2018-04-17 RX ORDER — HYDRALAZINE HYDROCHLORIDE 20 MG/ML
INJECTION INTRAMUSCULAR; INTRAVENOUS AS NEEDED
Status: DISCONTINUED | OUTPATIENT
Start: 2018-04-17 | End: 2018-04-17 | Stop reason: HOSPADM

## 2018-04-17 RX ADMIN — CEFAZOLIN SODIUM 2 G: 2 SOLUTION INTRAVENOUS at 13:11

## 2018-04-17 RX ADMIN — SODIUM CHLORIDE, SODIUM LACTATE, POTASSIUM CHLORIDE, CALCIUM CHLORIDE: 600; 310; 30; 20 INJECTION, SOLUTION INTRAVENOUS at 08:43

## 2018-04-17 RX ADMIN — ONDANSETRON 4 MG: 2 INJECTION INTRAMUSCULAR; INTRAVENOUS at 07:42

## 2018-04-17 RX ADMIN — Medication 2 G: at 07:45

## 2018-04-17 RX ADMIN — PROPOFOL 150 MG: 10 INJECTION, EMULSION INTRAVENOUS at 07:52

## 2018-04-17 RX ADMIN — HYDRALAZINE HYDROCHLORIDE 5 MG: 20 INJECTION INTRAMUSCULAR; INTRAVENOUS at 08:13

## 2018-04-17 RX ADMIN — GLYCOPYRROLATE 0.4 MG: 0.2 INJECTION INTRAMUSCULAR; INTRAVENOUS at 09:16

## 2018-04-17 RX ADMIN — ACETAMINOPHEN 1000 MG: 500 TABLET, FILM COATED ORAL at 06:40

## 2018-04-17 RX ADMIN — WARFARIN SODIUM 10 MG: 10 TABLET ORAL at 06:40

## 2018-04-17 RX ADMIN — LIDOCAINE HYDROCHLORIDE 80 MG: 20 INJECTION, SOLUTION EPIDURAL; INFILTRATION; INTRACAUDAL; PERINEURAL at 07:52

## 2018-04-17 RX ADMIN — CELECOXIB 200 MG: 100 CAPSULE ORAL at 17:54

## 2018-04-17 RX ADMIN — Medication 250 MG: at 17:58

## 2018-04-17 RX ADMIN — SODIUM CHLORIDE, SODIUM LACTATE, POTASSIUM CHLORIDE, CALCIUM CHLORIDE: 600; 310; 30; 20 INJECTION, SOLUTION INTRAVENOUS at 07:28

## 2018-04-17 RX ADMIN — ONDANSETRON 4 MG: 2 INJECTION INTRAMUSCULAR; INTRAVENOUS at 22:20

## 2018-04-17 RX ADMIN — ROCURONIUM BROMIDE 30 MG: 10 INJECTION, SOLUTION INTRAVENOUS at 08:08

## 2018-04-17 RX ADMIN — DOCUSATE SODIUM 100 MG: 100 CAPSULE, LIQUID FILLED ORAL at 17:54

## 2018-04-17 RX ADMIN — EPHEDRINE SULFATE 5 MG: 50 INJECTION, SOLUTION INTRAVENOUS at 08:36

## 2018-04-17 RX ADMIN — SUCCINYLCHOLINE CHLORIDE 180 MG: 20 INJECTION INTRAMUSCULAR; INTRAVENOUS at 07:52

## 2018-04-17 RX ADMIN — Medication 3 MG: at 09:16

## 2018-04-17 RX ADMIN — LIDOCAINE HYDROCHLORIDE 0.1 ML: 10 INJECTION, SOLUTION EPIDURAL; INFILTRATION; INTRACAUDAL; PERINEURAL at 07:08

## 2018-04-17 RX ADMIN — CELECOXIB 200 MG: 100 CAPSULE ORAL at 06:41

## 2018-04-17 RX ADMIN — SODIUM CHLORIDE, SODIUM LACTATE, POTASSIUM CHLORIDE, AND CALCIUM CHLORIDE 50 ML/HR: 600; 310; 30; 20 INJECTION, SOLUTION INTRAVENOUS at 06:30

## 2018-04-17 RX ADMIN — FAMOTIDINE 20 MG: 20 TABLET ORAL at 06:41

## 2018-04-17 RX ADMIN — ACETAMINOPHEN 1000 MG: 500 TABLET, FILM COATED ORAL at 17:54

## 2018-04-17 RX ADMIN — PREGABALIN 50 MG: 50 CAPSULE ORAL at 06:40

## 2018-04-17 RX ADMIN — MIDAZOLAM HYDROCHLORIDE 1 MG: 1 INJECTION, SOLUTION INTRAMUSCULAR; INTRAVENOUS at 07:07

## 2018-04-17 RX ADMIN — FENTANYL CITRATE 100 MCG: 50 INJECTION INTRAMUSCULAR; INTRAVENOUS at 07:51

## 2018-04-17 RX ADMIN — DOCUSATE SODIUM 100 MG: 100 CAPSULE, LIQUID FILLED ORAL at 14:50

## 2018-04-17 RX ADMIN — FENTANYL CITRATE 100 MCG: 50 INJECTION INTRAMUSCULAR; INTRAVENOUS at 07:07

## 2018-04-17 RX ADMIN — ONDANSETRON 4 MG: 2 INJECTION INTRAMUSCULAR; INTRAVENOUS at 07:52

## 2018-04-17 RX ADMIN — PROMETHAZINE HYDROCHLORIDE 25 MG: 25 INJECTION, SOLUTION INTRAMUSCULAR; INTRAVENOUS at 07:52

## 2018-04-17 RX ADMIN — CEFAZOLIN SODIUM 2 G: 2 SOLUTION INTRAVENOUS at 21:06

## 2018-04-17 RX ADMIN — Medication 10 ML: at 22:23

## 2018-04-17 RX ADMIN — Medication 10 ML: at 13:15

## 2018-04-17 RX ADMIN — SODIUM CHLORIDE 75 ML/HR: 900 INJECTION, SOLUTION INTRAVENOUS at 13:10

## 2018-04-17 RX ADMIN — ACETAMINOPHEN 1000 MG: 500 TABLET, FILM COATED ORAL at 14:50

## 2018-04-17 RX ADMIN — ACETAMINOPHEN 1000 MG: 500 TABLET, FILM COATED ORAL at 21:06

## 2018-04-17 RX ADMIN — NALOXONE HYDROCHLORIDE 0.4 MG: 0.4 INJECTION, SOLUTION INTRAMUSCULAR; INTRAVENOUS; SUBCUTANEOUS at 09:31

## 2018-04-17 RX ADMIN — MORPHINE SULFATE 10 MG: 10 INJECTION, SOLUTION INTRAMUSCULAR; INTRAVENOUS at 08:18

## 2018-04-17 RX ADMIN — SCOLOPAMINE TRANSDERMAL SYSTEM 1 PATCH: 1 PATCH, EXTENDED RELEASE TRANSDERMAL at 07:52

## 2018-04-17 RX ADMIN — ROPIVACAINE HYDROCHLORIDE 60 MG: 2 INJECTION, SOLUTION EPIDURAL; INFILTRATION at 07:10

## 2018-04-17 RX ADMIN — FERROUS SULFATE TAB 325 MG (65 MG ELEMENTAL FE) 325 MG: 325 (65 FE) TAB at 17:54

## 2018-04-17 NOTE — ANESTHESIA PREPROCEDURE EVALUATION
Anesthetic History   No history of anesthetic complications            Review of Systems / Medical History  Patient summary reviewed and pertinent labs reviewed    Pulmonary  Within defined limits                 Neuro/Psych   Within defined limits           Cardiovascular    Hypertension: well controlled              Exercise tolerance: >4 METS     GI/Hepatic/Renal  Within defined limits              Endo/Other  Within defined limits           Other Findings   Comments: Documentation of current medication  Current medications obtained, documented and obtained? YES      Risk Factors for Postoperative nausea/vomiting:       History of postoperative nausea/vomiting? NO       Female? YES       Motion sickness? NO       Intended opioid administration for postoperative analgesia? NO      Smoking Abstinence:  Current Smoker? NO  Elective Surgery? YES  Seen preoperatively by anesthesiologist or proxy prior to day of surgery? YES  Pt abstained from smoking 24 hours prior to anesthesia?  N/A    Preventive care/screening for High Blood Pressure:  Aged 18 years and older: YES  Screened for high blood pressure: YES  Patients with high blood pressure referred to primary care provider   for BP management: YES                 Physical Exam    Airway  Mallampati: II  TM Distance: 4 - 6 cm  Neck ROM: normal range of motion   Mouth opening: Normal     Cardiovascular  Regular rate and rhythm,  S1 and S2 normal,  no murmur, click, rub, or gallop             Dental  No notable dental hx       Pulmonary  Breath sounds clear to auscultation               Abdominal  GI exam deferred       Other Findings            Anesthetic Plan    ASA: 2  Anesthesia type: general and regional - femoral single shot          Induction: Intravenous  Anesthetic plan and risks discussed with: Patient

## 2018-04-17 NOTE — CONSULTS
[facility]. Pulmonary, Critical Care, and Sleep Medicine    Initial Patient Consult    Name: Machelle Dean MRN: [id number]   : 1945 Hospital: [address] [place] [address] Dr   Date: 2018        IMPRESSION:   · Postop right total knee replacement  · HTN  · Large hiatal hernia on imaging  · osteoarthritis     Patient Active Problem List   Diagnosis Code    Unspecified constipation K59.00    Fever, unspecified R50.9    Arthritis of knee M17.10        RECOMMENDATIONS:   · Continue Post op progression of care  · Will monitor BP, renal function, H/H  · Monitor I and O.   · Fluids- 24 hrs and progress to incremental PO intake  · Bronchial hygiene protocol  · Antibiotics- SCIP  · Aspiration precautions- keep head end elevated  · Bowel prep  · Pain control per ortho  · OT, PT, OOB and ambulate  · Will Follow for medical management  · DVT, PUD prophylaxis  · Discussed with patient. Subjective: This patient has been seen and evaluated at the request of Dr. Charlynn Lombard for postop management. Patient is a 67 y.o. female with h/o HTN who underwent right total knee replacement under general anesthesia with EBL 50 ml. Tolerated well with immediate postop slow waking up but now awake . Seen postop. No c/o SOB, Cough, chest pain  Denies nausea, vomiting  Denies dizziness, palpitations  Denies any rashes, itching. No other complaints offered. Reviewed medical records and available data. Past Medical History:   Diagnosis Date    Hypertension       Past Surgical History:   Procedure Laterality Date    HX HYSTERECTOMY        Prior to Admission medications    Medication Sig Start Date End Date Taking? Authorizing Provider   multivitamin (ONE A DAY) tablet Take 1 Tab by mouth daily. Yes Historical Provider   aspirin (ASPIRIN) 325 mg tablet Take 325 mg by mouth daily. Historical Provider   LISINOPRIL PO Take  by mouth.     Joe Gallagher MD   DOCOSAHEXANOIC ACID/EPA (FISH OIL PO) Take  by mouth.    Joe Gallagher MD   CALCIUM-VITAMIN D3 PO Take  by mouth. Joe Gallagher MD   MAGNESIUM CARBONATE PO Take  by mouth. Joe Gallagher MD     No Known Allergies   Social History   Substance Use Topics    Smoking status: Never Smoker    Smokeless tobacco: Never Used    Alcohol use 0.5 oz/week     1 Glasses of wine per week      Comment: occasionally      History reviewed. No pertinent family history. Current Facility-Administered Medications   Medication Dose Route Frequency    [START ON 2018] WARFARIN INFORMATION NOTE (COUMADIN)   Other Q24H    [START ON 2018] therapeutic multivitamin (THERAGRAN) tablet 1 Tab  1 Tab Oral DAILY    magnesium oxide tablet 250 mg  250 mg Oral BID    0.9% sodium chloride infusion  75 mL/hr IntraVENous CONTINUOUS    sodium chloride (NS) flush 5-10 mL  5-10 mL IntraVENous Q8H    ferrous sulfate tablet 325 mg  1 Tab Oral BID WITH MEALS    acetaminophen (TYLENOL) tablet 1,000 mg  1,000 mg Oral QID    ceFAZolin (ANCEF) 2g IVPB in 50 mL D5W  2 g IntraVENous Q8H    docusate sodium (COLACE) capsule 100 mg  100 mg Oral BID    celecoxib (CELEBREX) capsule 200 mg  200 mg Oral Q12H    [START ON 2018] aspirin chewable tablet 243 mg  243 mg Oral BID       Review of Systems:  Pertinent items are noted in HPI.   ROS    Objective:   Vital Signs:    Visit Vitals    /78 (BP 1 Location: Right arm, BP Patient Position: At rest)    Pulse 74    Temp 97.7 °F (36.5 °C)    Resp 15    Ht 5' (1.524 m)    Wt 55.3 kg (122 lb)    SpO2 98%    BMI 23.83 kg/m2       O2 Device: Nasal cannula   O2 Flow Rate (L/min): 2 l/min   Temp (24hrs), Av.6 °F (36.4 °C), Min:97.5 °F (36.4 °C), Max:97.9 °F (36.6 °C)       Intake/Output:   Last shift:       0701 -  1900  In: 1600 [I.V.:1600]  Out: -   Last 3 shifts:      Intake/Output Summary (Last 24 hours) at 18 1510  Last data filed at 18 1009   Gross per 24 hour   Intake             1600 ml   Output 0 ml   Net             1600 ml        Physical Exam:   General:  Alert, cooperative, no distress, appears stated age. Head:  Normocephalic, without obvious abnormality, atraumatic. Eyes:  Conjunctivae/corneas clear. ANicteric   Nose: Nares normal. Mucosa normal. No drainage or sinus tenderness. Throat: Lips, mucosa dry. NO thrush;    Neck: Supple, symmetrical, trachea midline, no adenopathy, thyroid: no enlargment/tenderness/nodules, no carotid bruit and no JVD. No crepitus   Back:   Symmetric, no curvature, no spine tenderness or flank pain   Lungs:   Bilateral auscultation - clear to auscultaion   Chest wall:  No tenderness or deformity. Heart:  Regular rate and rhythm, S1, S2 normal, no murmur, click, rub or gallop. Abdomen:   Soft, non-tender. Bowel sounds normal. No masses,  No organomegaly. No paradox   Extremities: normal, atraumatic, no cyanosis or edema. Surgical site- intact   Pulses: 1-2+ and symmetric all extremities. Skin: Skin color, texture, turgor normal. No rashes or lesions   Lymph nodes: Cervical, supraclavicular, and axillary nodes normal.   Neurologic: Grossly nonfocal          Data review:   Labs:  Recent Results (from the past 24 hour(s))   GLUCOSE, POC    Collection Time: 04/17/18  6:38 AM   Result Value Ref Range    Glucose (POC) 101 70 - 110 mg/dL       PFT Results  (Last 48 hours)    None        Echo Results  (Last 48 hours)    None        Imaging:  I have personally reviewed the patients radiographs and have reviewed the reports:  CXR Results  3/2018    IMPRESSION:  1. No acute infiltrates or pleural effusion. 2. Enlarging hiatal hernia containing bowel loops.         CT Results  (Last 48 hours)    None            High complexity decision making was performed during the evaluation of this patient at high risk for decompensation with multiple organ involvement     Above mentioned total time spent on reviewing the case/medical record/data/notes/EMR/patient examination/documentation/coordinating care with nurse/consultants, exclusive of procedures with complex decision making performed and > 50% time spent in face to face evaluation.      Evan Orellana MD

## 2018-04-17 NOTE — INTERVAL H&P NOTE
H&P Update:  Kamilla Tavarez was seen and examined. History and physical has been reviewed. The patient has been examined.  There have been no significant clinical changes since the completion of the originally dated History and Physical.    Signed By: Denny Powell MD     April 17, 2018 7:03 AM

## 2018-04-17 NOTE — PROGRESS NOTES
Problem: Mobility Impaired (Adult and Pediatric)  Goal: *Acute Goals and Plan of Care (Insert Text)  Physical Therapy Goals  Initiated 4/17/2018 and to be accomplished within 7 day(s)  1. Patient will move from supine to sit and sit to supine , scoot up and down and roll side to side in bed with supervision/set-up. 2.  Patient will transfer from bed to chair and chair to bed with supervision/set-up using the least restrictive device. 3.  Patient will perform sit to stand with supervision/set-up. 4.  Patient will ambulate with supervision/set-up for 200 feet with the least restrictive device. 5.  Patient will ascend/descend 4 stairs with 1-2 handrail(s) with supervision/set-up. Outcome: Progressing Towards Goal  physical Therapy EVALUATION    Patient: Ibeth Asif (1 y.o. female)  Date: 4/17/2018  Primary Diagnosis: Osteoarthritis of right knee, unspecified osteoarthritis type [M17.11]  Procedure(s) (LRB):  RIGHT TOTAL KNEE ARTHROPLASTY (Right) Day of Surgery   Precautions:   Fall, WBAT    ASSESSMENT :  PT orders received and patient cleared by nursing to participate with therapy. Patient is a 67 y.o. female admitted to the hospital s/p R TKA by Dr. Mena Engel 4/17/18. Patient consents to PT evaluation and treatment. Pt required PT and nursing to assist with waking up pt with multiple sternal rubs to arouse pt. After pt was woken up, she was oriented x 4. Pt needed to use the restroom. Supine to sit with HOB raised CGA. Scooting to edge of bed SBA. Sit to stands required multiple cues for hand placement mod A from bed and min A from toilet. Gait 10 feet to toilet with multiple cues for sequencing for safety. Pt initially taking very small steps and moving walker at same time and shuffling feet. After multiple cues and with assistance pt able to take larger steps and pushing walker seperately from stepping. Pt became dizzy and complained of nausea in bathroom.  Ambulated 5 feet bathroom to chair with better sequencing but still needed cues. Reviewed therex in chair including ankle pumps, heel slides, and SLR. R knee ROM 6-78 degrees. Pt very drowsy at end of session. Pt ended therapy sitting up in chair with all needs met with towel roll under ankle and ice donned. Educated no towels or pillows under knee. Also educated on OOB 3-5 times a day with nursing assistance. Patient will benefit from skilled intervention to address the above impairments and increase functional independence. Patients rehabilitation potential is considered to be Good  Factors which may influence rehabilitation potential include:   []         None noted  []         Mental ability/status  []         Medical condition  []         Home/family situation and support systems  []         Safety awareness  []         Pain tolerance/management  []         Other:      PLAN :  Recommendations and Planned Interventions:  [x]           Bed Mobility Training             [x]    Neuromuscular Re-Education  [x]           Transfer Training                   []    Orthotic/Prosthetic Training  [x]           Gait Training                          []    Modalities  [x]           Therapeutic Exercises          []    Edema Management/Control  [x]           Therapeutic Activities            [x]    Patient and Family Training/Education  []           Other (comment):    Frequency/Duration: Patient will be followed by physical therapy 1-2 times per day to address goals. Discharge Recommendations: Home Health  Further Equipment Recommendations for Discharge: Pt has RW     SUBJECTIVE:   Patient stated I need to pee.     OBJECTIVE DATA SUMMARY:     Past Medical History:   Diagnosis Date    Hypertension      Past Surgical History:   Procedure Laterality Date    HX HYSTERECTOMY       Barriers to Learning/Limitations: yes;  altered mental status (i.e.Sedation, Confusion)  Compensate with: Visual Cues, Verbal Cues and Tactile Cues  Prior Level of Function/Home Situation: Independent with all mobility including gait with no AD. Home Situation  Home Environment: Private residence  # Steps to Enter: 4  Rails to Enter: Yes  Hand Rails : Bilateral  One/Two Story Residence: Two story (but will be staying on 1st level)  # of Interior Steps: 14  Living Alone: No  Support Systems: Spouse/Significant Other/Partner (son will be assisting initially)  Patient Expects to be Discharged to[de-identified] Private residence  Current DME Used/Available at Home: [employer], Walker  Critical Behavior:  Neurologic State: Drowsy; Alert  Psychosocial  Patient Behaviors: Calm;Lethargic;Cooperative  Family  Behaviors: Cooperative;Calm  Purposeful Interaction: Yes  Pt Identified Daily Priority: Clinical issues (comment)  Caritas Process: Nurture loving kindness;Establish trust  Caring Interventions: Reassure; Therapeutic modalities  Reassure: Therapeutic listening;Caring rounds  Therapeutic Modalities: Humor; Intentional therapeutic touch  Strength:    Strength:  (B LE, L WFL, R 3 to 3+/5)  Tone & Sensation:   Tone: Normal (B LE)  Sensation: Intact (B LE)   Range Of Motion:  AROM:  (B LE, L WFL, R knee 6-78)  Functional Mobility:  Bed Mobility:  Supine to Sit: Contact guard assistance (with HOB raised)  Scooting: Stand-by assistance  Transfers:  Sit to Stand: Minimum assistance; Moderate assistance (multiple cues for hand placement)  Stand to Sit: Minimum assistance  Balance:   Sitting: Intact  Standing: Impaired; With support  Standing - Static: Fair  Standing - Dynamic : Poor  Ambulation/Gait Training:  Distance (ft): 15 Feet (ft) (10'+5')  Assistive Device: Walker, rolling  Ambulation - Level of Assistance: Minimal assistance  Base of Support: Shift to left  Speed/Peyton: Slow;Pace decreased (<100 feet/min)  Therapeutic Exercises:   Reviewed and educated on ankle pumps, SLR, and heel slides.    Pain:  Pre: 1/10 R knee  Post: 1/10 R knee  Activity Tolerance:   fair  Please refer to the flowsheet for vital signs taken during this treatment. After treatment:   [x] Patient left in no apparent distress sitting up in chair  [] Patient left sitting on EOB  [] Patient left in no apparent distress in bed  [] Patient declined to be OOB at this time due to    [x] Call bell left within reach  [x] Nursing notified(Nani)  [x] Caregiver present  [] Bed alarm activated  [x] Personal items in reach     COMMUNICATION/EDUCATION:   [x]         Fall prevention education was provided and the patient/caregiver indicated understanding. [x]         Patient/family have participated as able in goal setting and plan of care. [x]         Patient/family agree to work toward stated goals and plan of care. []         Patient understands intent and goals of therapy, but is neutral about his/her participation. []         Patient is unable to participate in goal setting and plan of care. Thank you for this referral.  Flaquita Or, PT, DPT   Time Calculation: 43 mins      Mobility  Current  CK= 40-59%   Goal  CI= 1-19%. The severity rating is based on the Level of Assistance required for Functional Mobility and ADLs.     Eval Complexity: History: LOW Complexity : Zero comorbidities / personal factors that will impact the outcome / POCExam:HIGH Complexity : 4+ Standardized tests and measures addressing body structure, function, activity limitation and / or participation in recreation  Presentation: LOW Complexity : Stable, uncomplicated  Clinical Decision Making:Low Complexity   Overall Complexity:LOW

## 2018-04-17 NOTE — ANESTHESIA POSTPROCEDURE EVALUATION
Post-Anesthesia Evaluation and Assessment    Patient: Estuardo Lanes MRN: [id number]  SSN: [id number]    YOB: 1945  Age: 67 y.o. Sex: female       Cardiovascular Function/Vital Signs  Visit Vitals    /55    Pulse 85    Temp 36.4 °C (97.6 °F)    Resp 15    Ht 5' (1.524 m)    Wt 55.3 kg (122 lb)    SpO2 98%    BMI 23.83 kg/m2       Patient is status post general, regional anesthesia for Procedure(s):  RIGHT TOTAL KNEE ARTHROPLASTY. Nausea/Vomiting: None    Postoperative hydration reviewed and adequate. Pain:  Pain Scale 1: Visual (04/17/18 1116)  Pain Intensity 1: 0 (04/17/18 1116)   Managed    Neurological Status:   Neuro (WDL): Exceptions to WDL (04/17/18 0936)  Neuro  Neurologic State: Anesthetized (04/17/18 0936)   At baseline    Mental Status and Level of Consciousness: Arousable    Pulmonary Status:   O2 Device: Nasal cannula (04/17/18 1100)   Adequate oxygenation and airway patent    Complications related to anesthesia: None    Post-anesthesia assessment completed.  No concerns    Signed By: Mian Bell MD     April 17, 2018

## 2018-04-17 NOTE — H&P (VIEW-ONLY)
[address] [facility], [address] [facility]  [phone]           HISTORY & PHYSICAL      Patient: Ricardo Turcios                MRN: [id number]       SSN: [id number]  YOB: 1945        AGE: 67 y.o. SEX: female  Body mass index is 22.14 kg/(m^2). PCP: Yaa Oro MD  04/11/18      CC: right knee end stage OA  Problem List Items Addressed This Visit     None      Visit Diagnoses     Primary osteoarthritis of right knee    -  Primary            HPI:  The patient is a pleasant 67 y.o. whom has end stage OA of their Right knee and has failed conservative treatment including but not limited to NSAIDS, cortisone injections, viscosupplementation, PT, and pain medicine. Due to the current findings and affected activities of daily living, surgical intervention is indicated. The alternatives, risks, complications, as well as expected outcome were discussed. These include but are not limited to infection, blood loss, need for blood transfusion, neurovascular damage, DVT, PE,  post-op stiffness and pain, leg length discrepancy, dislocation, anesthetic complications, prothesis longevity, need for more surgery, MI, stroke, and even death. The patient understands and wishes to proceed with surgery. Past Medical History:   Diagnosis Date    Hypertension          Current Outpatient Prescriptions:     aspirin (ASPIRIN) 325 mg tablet, Take 325 mg by mouth daily. , Disp: , Rfl:     CALCIUM-VITAMIN D3 PO, Take  by mouth., Disp: , Rfl:     MAGNESIUM CARBONATE PO, Take  by mouth., Disp: , Rfl:     LISINOPRIL PO, Take  by mouth., Disp: , Rfl:     DOCOSAHEXANOIC ACID/EPA (FISH OIL PO), Take  by mouth., Disp: , Rfl:     No Known Allergies    Social History     Social History    Marital status:      Spouse name: N/A    Number of children: N/A    Years of education: N/A     Occupational History    Not on file.      Social History Main Topics    Smoking status: Never Smoker    Smokeless tobacco: Never Used    Alcohol use 0.5 oz/week     1 Glasses of wine per week      Comment: occasionally    Drug use: No    Sexual activity: Yes     Partners: Male     Other Topics Concern    Not on file     Social History Narrative       Past Surgical History:   Procedure Laterality Date    HX HYSTERECTOMY         Family History:  Non-contributory. PE:  Visit Vitals    /77    Pulse 78    Temp 97.1 °F (36.2 °C)    Resp 16    Ht 5' 3\" (1.6 m)    Wt 125 lb (56.7 kg)    SpO2 99%    BMI 22.14 kg/m2     A&O X3, NAD, well develop, well nourished  Heart: S1-S2, rrr  Lungs: CTA bilat  Abd: soft, nt, nt, + bs in all quadrants  Ext:  Pos distal pulses to DP, PT      X-ray: right knee shows end stage OA    Labs: labs were reviewed and wnl.  ua neg    A:  Right  knee end stage OA    P:  At this point we will move forward with surgery. Again, the alternatives, risks, complications, as well as expected outcome were discussed and the patient wishes to proceed with surgery. Pt has been instructed to stop aspirin, nsaids, rheumatologic medications and blood thinners. They have also been instructed to continue on any heart and bp meds and to take them the morning of surgery with sips of water. The patient will require in patient admission due to above stated medical conditions as well the the surgical challenges given the anatomy and bone quality.          Drew Sosa

## 2018-04-17 NOTE — ROUTINE PROCESS
1136 rcved telephone report from PACU nurse, will assess pt upon arrival to the unit    1203 rcved pt via bed from PACU, pt sleepy but able to awaken, vitals obtained, sps at bedside, will monitor     1731 pt sitting up in the recliner awake, no distress noted, will monitor     2004 Bedside and Verbal shift change report given to [address] (oncoming nurse) by Kirill Clay (offgoing nurse). Report included the following information SBAR, Kardex and MAR.

## 2018-04-17 NOTE — ANESTHESIA PROCEDURE NOTES
Peripheral Block    Start time: 4/17/2018 7:01 AM  End time: 4/17/2018 7:11 AM  Performed by: Carlos Zelaya  Authorized by: Carlos Zelaya       Pre-procedure: Indications: at surgeon's request and post-op pain management    Preanesthetic Checklist: patient identified, risks and benefits discussed, site marked, timeout performed, anesthesia consent given and patient being monitored      Block Type:   Block Type:  Femoral single shot  Monitoring:  Standard ASA monitoring, continuous pulse ox, oxygen, responsive to questions, heart rate and frequent vital sign checks  Injection Technique:  Single shot  Procedures: ultrasound guided    Patient Position: supine  Prep: chlorhexidine    Location:  Upper thigh  Needle Type:  Ultraplex  Needle Gauge:  22 G  Needle Localization:  Ultrasound guidance  Medication Injected:  0.2%  ropivacaine  Volume (mL):  30    Assessment:  Number of attempts:  1  Injection Assessment:  Incremental injection every 5 mL, no paresthesia, ultrasound image on chart, local visualized surrounding nerve on ultrasound, negative aspiration for blood and no intravascular symptoms  Patient tolerance:  Patient tolerated the procedure well with no immediate complications  For Vitals see nursing notes.      Tavares Montano MD  7:13 AM

## 2018-04-17 NOTE — OP NOTES
[address] [facility] REPORT    Matilde Kapoor  MR#: [id number]  : 1945  ACCOUNT #: [de-identified]   DATE OF SERVICE: 2018    PREOPERATIVE DIAGNOSIS:  End-stage arthritis of the right knee with fixed varus and fixed flexion deformities. SECONDARY DIAGNOSIS:  Osteoporosis. POSTOPERATIVE DIAGNOSIS:  End-stage arthritis of the right knee with fixed varus and fixed flexion deformities. OPERATIVE PROCEDURE:  Right total knee replacement using the [place] [employer] system with a size 3 right posterior stabilized femoral component, size 3 tibia, size 39 TS insert, 32 asymmetric patella. Correction of fixed varus and flexion deformities. SURGEON:  Abdon Loo MD    ASSISTANTS    1.  John Li. 2.  Selena Cooper. ANESTHESIA: see below    ANESTHETIST:  Dr. Radha Macias. Preoperative femoral nerve block with light general.    ESTIMATED BLOOD LOSS:  50 mL. SPECIMENS REMOVED:  None. COMPLICATIONS:  No complications. IMPLANTS: see above    DESCRIPTION OF PROCEDURE:  After the anesthetic was successfully induced, it was confirmed that the patient did receive preoperative antibiotics and a timeout was performed, midline incision. Needle brought in usual fashion. Femoral canal was aspirated, lavaged and re-aspirated prior to instrumentation. Cut for 5 degrees to the appropriate size, the cross clamp was utilized for the cutting all cuts to check for trueness and squareness. All soft tissue structures protected during the sawing process and the knee with modified gap balancing technique would be performed. We would take an extra 2 off the distal femur to correct the fixed flexion deformity and after preliminary femoral cuts, would switch our attention to the tibia. We used the external alignment guide and resected enough to get a decent cleanup cut without skive.     We then removed posterior osteophytes while protecting the neurovascular bundle, which were copious and using the Aquamantys and Exparel cocktail. We then gap balanced the knee between medial lateral flexion and extension, thus confirming correct femoral rotation. We then did our femoral finishing followed by placement of the trial components to center tibial rotation which was marked and later replaced. We then resurfaced the patella, restoring patellar thickness anatomically and using a rongeur to smooth the edges. With all the trial components in place, we checked the overall alignment, range of motion, soft tissue balance and patellar tracking, stability and alignment of the knee, all of which we were delighted with. Fashioned a bone plug for the femoral canal to further control hemostasis. Cemented in the knee, removing all extraneous cement, holding the knee in full extension, until the cement was fully cured. Further cement removal.  Further pulse lavage. Further trialing. O was happiest with the 9, locked in place, and again reduced the knee, let the tourniquet down. Routine closure. Fully flexed the knee prior to closure of the skin. At the end of the case, instrument, sponge and needle count was correct. No complications. The patient tolerated the procedure well. Blood loss:  Less than 50 mL. Excellent outcome.       Alexandrea Gregory MD AM / TN  D: 04/17/2018 09:29     T: 04/17/2018 10:12  JOB #: [id number]

## 2018-04-17 NOTE — PROGRESS NOTES
Rounded on patient post total knee replacement. Activity: Reinforced importance of getting OOB for all meals, going to bathroom to help prevent blood clots. VTE prophylaxis: Instructed patient to use their SCD's when not up and walking. To use while in bed and in the chair. Educated re: ankle pumps to assist with circulation when in hospital and at home. Medications: Reviewed pain medications patient is taking and the importance of keeping pain under control to help with getting OOB and therapy. Reminded the patient to always eat a snack with their pain medication to help to prevent nausea. Encouraged patient to monitor for constipation and to take a stool softner/laxative while recovering and on pain medication. Incentive Spirometry: Reinforced use of incentive spirometer with return demonstration by patient. Wound Care: Dressing to surgical site dry and intact ace wrap with ice inplace. Patient instructed not to take dressing off at home. Patient given CHG wash to use in hospital and at home. Stressed importance of using a clean towel and washcloth daily. Reminded to put on clean clothes and night clothes daily. Ice Protocol: Ice man machine in place per protocol. Patient Safety: Call light in reach. Patient and family reminded to call for help toget OOB or when leaving bathroom for safety. Phone and other items also within reach per patient's request.     Diet: Educated patient on the importance of eating three well balanced meals a day with protein to promote bone/muscle healing. Reminded patient to drink lots of fluids to protect kidneys from all the medications being taken currently with recovery. Patient given educational material to remind them to continue doing everything at home to prevent complications and have a successful recovery. Patient and family verbalized understand of all information and education discussed.     Patient and family given the opportunity for asking questions.

## 2018-04-17 NOTE — PROGRESS NOTES
PT orders received, chart reviewed. Pt unable to participate with PT due to:  []  Nausea/vomiting  []  Eating  []  Pain  []  Pt lethargic  []  Off Unit  []  Pt refused  [x]  Other, per nursing Lockie Cap) pt is very sleepy and asked to sleep a little bit. Will follow up in the afternoon. Will f/u later as patient's schedule allows.  Thank you for this referral.  Payam Anderson, PT, DPT

## 2018-04-17 NOTE — IP AVS SNAPSHOT
[address] Patient: Pascual Lopez MRN: [id number] MTV:4/2/9092 About your hospitalization You were admitted on:  April 17, 2018 You last received care in the:  [facility] [address] [facility] You were discharged on:  April 18, 2018 Why you were hospitalized Your primary diagnosis was:  Not on File Your diagnoses also included: Arthritis Of Knee Follow-up Information Follow up With Details Comments Contact Info Myke Shi MD On 4/19/2018 April 19, 2018 @ 09:15 with Dr. Luc Potter. [address]60 [facility] [address] 
[phone] Marleny Demond On 5/2/2018 May 2, 2018 @ 09:30 with Tamara 72 920 [facility] [address] Your Scheduled Appointments Thursday April 19, 2018 To Be Determined START OF CARE with Lachelle Rowell RN  
[address] CARE SCHEDULING/INTAKE ([facility]) [address] [place] [address] CARE SCHEDULING/INTAKE ( HOME HEALTH/ HOSPICE) Wednesday May 02, 2018  9:30 AM EDT  
POST OP with SAUMYA Younger  
[facility] [place] [id number] [place] [address]  
[phone] Discharge Orders Procedure Order Date Status Priority Quantity Spec Type Associated Dx WALKER STANDARD 04/18/18 1018 Normal Routine 1  Arthritis of knee [[id number]] ELEVATED TOILET SEAT 04/18/18 1018 Normal Routine 1  Arthritis of knee [[id number]] COMMODE CHAIR 04/18/18 1018 Normal Routine 1  Arthritis of knee [[id number]] SHOWER CHAIR 04/18/18 1018 Normal Routine 1  Arthritis of knee [[id number]] [address] [employer] 04/18/18 1018 Normal Routine 1  Arthritis of knee [[id number]] Comments: Total knee protocol, wbat Aspirin therapy 
aquacel ag dressing pod 7 and prn A check jaja indicates which time of day the medication should be taken. My Medications START taking these medications Instructions Each Dose to Equal  
 Morning Noon Evening Bedtime  
 aspirin 81 mg chewable tablet Replaces:  aspirin 325 mg tablet Your last dose was: Your next dose is: Take 3 Tabs by mouth two (2) times a day. 243 mg  
    
   
   
   
  
 celecoxib 200 mg capsule Commonly known as:  CELEBREX Your last dose was: Your next dose is: Take 1 Cap by mouth every twelve (12) hours every twelve (12) hours for 90 days. 200 mg  
    
   
   
   
  
 ferrous sulfate 325 mg (65 mg iron) tablet Your last dose was: Your next dose is: Take 1 Tab by mouth two (2) times daily (with meals). 325 mg  
    
   
   
   
  
 oxyCODONE-acetaminophen 7.5-325 mg per tablet Commonly known as:  PERCOCET Your last dose was: Your next dose is: Take 1-2 Tabs by mouth every four (4) hours as needed for Pain. Max Daily Amount: 12 Tabs. 1-2 Tab CONTINUE taking these medications Instructions Each Dose to Equal  
 Morning Noon Evening Bedtime CALCIUM-VITAMIN D3 PO Your last dose was: Your next dose is: Take  by mouth. LISINOPRIL PO Your last dose was: Your next dose is: Take  by mouth. MAGNESIUM CARBONATE PO Your last dose was: Your next dose is: Take  by mouth.  
     
   
   
   
  
 multivitamin tablet Commonly known as:  ONE A DAY Your last dose was: Your next dose is: Take 1 Tab by mouth daily. 1 Tab STOP taking these medications   
 aspirin 325 mg tablet Commonly known as:  ASPIRIN Replaced by:  aspirin 81 mg chewable tablet  FISH OIL PO  
   
  
  
  
 Where to Get Your Medications Information on where to get these meds will be given to you by the nurse or doctor. ! Ask your nurse or doctor about these medications  
  aspirin 81 mg chewable tablet  
 celecoxib 200 mg capsule  
 ferrous sulfate 325 mg (65 mg iron) tablet  
 oxyCODONE-acetaminophen 7.5-325 mg per tablet Opioid Education Prescription Opioids: What You Need to Know: 
 
 
 
 
 
 
PATIENT INSTRUCTIONS: 
 
 
F-face looks uneven A-arms unable to move or move unevenly S-speech slurred or non-existent T-time-call 911 as soon as signs and symptoms begin-DO NOT go Back to bed or wait to see if you get better-TIME IS BRAIN. The discharge information has been reviewed with the patient. The patient verbalized understanding. Sounday Activation Thank you for requesting access to Sounday. Please follow the instructions below to securely access and download your online medical record. Sounday allows you to send messages to your doctor, view your test results, renew your prescriptions, schedule appointments, and more. How Do I Sign Up? 1. In your internet browser, go to [email or web address]. 2. Click on the First Time User? Click Here link in the Sign In box. You will see the New Member Sign Up page. 3. Enter your Fillm Access Code exactly as it appears below. You will not need to use this code after youve completed the sign-up process. If you do not sign up before the expiration date, you must request a new code. Fillm Access Code: [email or web address] Expires: 2012  9:42 AM (This is the date your Fillm access code will ) 4. Enter the last four digits of your Social Security Number (xxxx) and Date of Birth (mm/dd/yyyy) as indicated and click Submit. You will be taken to the next sign-up page. 5. Create a Fillm ID. This will be your Fillm login ID and cannot be changed, so think of one that is secure and easy to remember. 6. Create a Fillm password. You can change your password at any time. 7. Enter your Password Reset Question and Answer. This can be used at a later time if you forget your password. 8. Enter your e-mail address. You will receive e-mail notification when new information is available in [address] 9. Click Sign Up. You can now view and download portions of your medical record. 10. Click the Download Summary menu link to download a portable copy of your medical information. Additional Information If you have questions, please visit the Frequently Asked Questions section of the Fillm website at [email or web address] Remember, Fillm is NOT to be used for urgent needs. For medical emergencies, dial 911. Armband removed and shredded Discharge Instructions for Total Knee Replacement Patients · The dressing on your knee will be changed by the Home Health professional at the appropriate time. Keep your incision clean and dry. Do not apply any ointments to the incision. · You may shower as long as you keep you incision dry. When showering, leave your dressing on. The dressing is waterproof as long as the edges are sealed. · Notify your surgeon if: 
· Your temperature is greater than 100.5 · You have pain not controlled by your pain medication · You have increased drainage from your incision · You have increased redness or swelling in your leg · You have chest pain, shortness of breath, or any other problems · Do your exercises as instructed by the home physical therapist. 
 
· Bend and straighten your operative leg every hour. Walk once an hour during normal walking hours. · During periods of inactivity or rest, your leg should be elevated with a rolled towel or folded pillow under the heel to keep the knee straight. · Do not place anything under the knee. · Do not sit in a recliner chair with the footrest elevated. · You may use ice to your knee for 20-30 minutes after exercise and as needed. Do not apply the ice pack directly to your skin. Use a barrier such as your pant leg or a thin towel. · If you have NADER hose (the white support stockings), remove them at bedtime and re-apply the hose in the morning for the next 2 weeks. Best of luck with your new knee and [address] YOU for choosing the [address]! G.I. Java Announcement We are excited to announce that we are making your provider's discharge notes available to you in G.I. Java. You will see these notes when they are completed and signed by the physician that discharged you from your recent hospital stay. If you have any questions or concerns about any information you see in G.I. Java, please call the Health Information Department where you were seen or reach out to your Primary Care Provider for more information about your plan of care. Introducing [facility]!    
 [facility] introduces G.I. Java patient portal. Now you can access parts of your medical record, email your doctor's office, and request medication refills online. 1. In your internet browser, go to [email or web address] 2. Click on the First Time User? Click Here link in the Sign In box. You will see the New Member Sign Up page. 3. Enter your SemaConnect Access Code exactly as it appears below. You will not need to use this code after youve completed the sign-up process. If you do not sign up before the expiration date, you must request a new code. · SemaConnect Access Code: [email or web address] Expires: 4/26/2018  3:37 PM 
 
4. Enter the last four digits of your Social Security Number (xxxx) and Date of Birth (mm/dd/yyyy) as indicated and click Submit. You will be taken to the next sign-up page. 5. Create a SemaConnect ID. This will be your SemaConnect login ID and cannot be changed, so think of one that is secure and easy to remember. 6. Create a SemaConnect password. You can change your password at any time. 7. Enter your Password Reset Question and Answer. This can be used at a later time if you forget your password. 8. Enter your e-mail address. You will receive e-mail notification when new information is available in [address] 9. Click Sign Up. You can now view and download portions of your medical record. 10. Click the Download Summary menu link to download a portable copy of your medical information. If you have questions, please visit the Frequently Asked Questions section of the SemaConnect website. Remember, SemaConnect is NOT to be used for urgent needs. For medical emergencies, dial 911. Now available from your iPhone and Android! Introducing Nir Negrete As a [facility] patient, I wanted to make you aware of our electronic visit tool called Nir Negrete. [facility] 24/7 allows you to connect within minutes with a medical provider 24 hours a day, seven days a week via a mobile device or tablet or logging into a secure website from your computer. You can access U4iA Games from anywhere in the [place]. A virtual visit might be right for you when you have a simple condition and feel like you just dont want to get out of bed, or cant get away from work for an appointment, when your regular [facility] provider is not available (evenings, weekends or holidays), or when youre out of town and need minor care. Electronic visits cost only $49 and if the [employer] 24/Broadlink provider determines a prescription is needed to treat your condition, one can be electronically transmitted to a nearby pharmacy*. Please take a moment to enroll today if you have not already done so. The enrollment process is free and takes just a few minutes. To enroll, please download the Chujian chantal to your tablet or phone, or visit [email or web address] to enroll on your computer. And, as an [address] [place] [address] patient with a Phase III Development account, the results of your visits will be scanned into your electronic medical record and your primary care provider will be able to view the scanned results. We urge you to continue to see your regular [facility] provider for your ongoing medical care. And while your primary care provider may not be the one available when you seek a Nir Palmafin virtual visit, the peace of mind you get from getting a real diagnosis real time can be priceless. For more information on Shoptimisejossefin, view our Frequently Asked Questions (FAQs) at [email or web address]. Sincerely, 
 
Abilio Dunaway MD 
Chief Medical Officer [employer] *:  certain medications cannot be prescribed via Shoptimiseevelio Providers Seen During Your Hospitalization Provider Specialty Primary office phone Dasha Nola, [address] [facility] Orth[facility] Surgery [phone] Your Primary Care Physician (PCP) Primary Care Physician Office Phone Office Fax [facility] [phone] You are allergic to the following No active allergies Recent Documentation Height Weight BMI OB Status Smoking Status 1.524 m 55.3 kg 23.83 kg/m2 Postmenopausal Never Smoker Emergency Contacts Name Discharge Info Relation Home Work Mobile [address] [facility] [facility] [address] [facility] [address] CAREGIVER [3] Spouse [3] [phone] [address] CAREGIVER [3] Son [22] [phone] Patient Belongings The following personal items are in your possession at time of discharge: 
  Dental Appliances: None  Visual Aid: None      Home Medications: None   Jewelry: None  Clothing: Jacket/Coat, Sweater, Socks, Footwear, Shirt, Undergarments, Pants Discharge Instructions Attachments/References ASPIRIN (BY MOUTH) (ENGLISH) CELECOXIB (BY MOUTH) (ENGLISH) IRON SUPPLEMENTS (BY MOUTH) (ENGLISH) OXYCODONE/ACETAMINOPHEN (BY MOUTH) (ENGLISH) Patient Handouts Aspirin (By mouth) Aspirin (AS-pir-in) Treats pain, fever, and inflammation. May lower risk of heart attack and stroke. Brand Name(s): Ascriptin Regular Strength, Aspergum, Aspir Low, Aspirin Adult Low Dose, Aspirin Low Dose, Seth Aspirin Children's, Seth Aspirin Regimen, Seth Extra Strength, Seth Genuine Aspirin, Seth Low Dose, Bufferin, Bufferin Low Dose, Durlaza, Ecotrin, Ecpirin There may be other brand names for this medicine. When This Medicine Should Not Be Used: This medicine is not right for everyone. Do not use it if you had an allergic reaction to aspirin or other NSAIDs, or if you have a history of asthma with nasal polyps and rhinitis. How to Use This Medicine:  
Delayed Release Capsule, Long Acting Capsule, Gum, Tablet, Chewable Tablet, Fizzy Tablet, Coated Tablet, Long Acting Tablet, 24 Hour Capsule · Your doctor will tell you how much medicine to use. Do not use more than directed. · It is best to take this medicine with food or milk. · Capsule, tablet, or coated tablet: Swallow whole. Do not crush, break, or chew it. · Chewable tablet: You may chew it completely or swallow it whole. · Gum: Chew completely to make sure you get as much medicine as possible. Drink a full glass (8 ounces) of water after chewing the gum. · Swallow the extended-release capsule whole. Do not crush, break, or chew it. Take the capsule with a full glass of water at the same time each day. · Follow the instructions on the medicine label if you are using this medicine without a prescription. · Missed dose: If you miss a dose of Durlaza, skip the missed dose and go back to your regular dosing schedule. Do not take extra medicine to make up for a missed dose. · Store the medicine in a closed container at room temperature, away from heat, moisture, and direct light. Drugs and Foods to Avoid: Ask your doctor or pharmacist before using any other medicine, including over-the-counter medicines, vitamins, and herbal products. · Some foods and medicines can affect how aspirin works. Tell your doctor if you are using any of the following: ¨ Dipyridamole, methotrexate, probenecid, sulfinpyrazone, ticlopidine ¨ Blood thinner (including clopidogrel, prasugrel, ticagrelor, warfarin) ¨ Blood pressure medicine ¨ Medicine to treat seizures (including phenytoin, valproic acid) ¨ NSAID pain or arthritis medicine (including celecoxib, diclofenac, ibuprofen, naproxen) ¨ Steroid medicine (including dexamethasone, hydrocortisone, methylprednisolone, prednisolone, prednisone) · Do not take Durlaza 2 hours before or 1 hour after you drink alcohol or take medicines that contain alcohol. Warnings While Using This Medicine: · Tell your doctor if you are pregnant or breastfeeding. Do not use this medicine during the later part of a pregnancy unless your doctor tells you to. · Tell your doctor if you have kidney disease, liver disease, high blood pressure, heart disease, or a history of stomach bleeding or ulcers. · This medicine may increase your risk for bleeding, including stomach ulcers. · Do not give aspirin to a child or teenager who has chickenpox or flu symptoms, unless the doctor says it is okay. Aspirin can cause a life-threatening reaction called Reye syndrome. · Tell any doctor or dentist who treats you that you are using this medicine. This medicine may affect certain medical test results. · Keep all medicine out of the reach of children. Never share your medicine with anyone. Possible Side Effects While Using This Medicine:  
Call your doctor right away if you notice any of these side effects: · Allergic reaction: Itching or hives, swelling in your face or hands, swelling or tingling in your mouth or throat, chest tightness, trouble breathing · Bloody or black stools, bloody vomit or vomit that looks like coffee grounds · Chest tightness, wheezing · Ringing in the ears · Severe stomach pain · Unusual bleeding, bruising, or weakness If you notice other side effects that you think are caused by this medicine, tell your doctor. Call your doctor for medical advice about side effects. You may report side effects to FDA at 3-487-FDA-0104 © 2017 [facility] Information is for End User's use only and may not be sold, redistributed or otherwise used for commercial purposes. The above information is an  only. It is not intended as medical advice for individual conditions or treatments. Talk to your doctor, nurse or pharmacist before following any medical regimen to see if it is safe and effective for you. Celecoxib (By mouth) Celecoxib ([id number]) Treats pain. This medicine is an NSAID. Brand Name(s): CeleBREX, SmartRx CapXib Kit There may be other brand names for this medicine. When This Medicine Should Not Be Used: This medicine is not right for everyone. Do not use it if you had an allergic reaction (including asthma) to celecoxib, aspirin, NSAIDs, or a sulfa drug (such as sulfamethoxazole). Do not use this medicine right before or right after coronary artery bypass graft (CABG). How to Use This Medicine:  
Capsule · Your doctor will tell you how much medicine to use. Do not use more than directed. · It is best to take this medicine with food or milk so it does not upset your stomach. · Use this medicine for the shortest time possible and in the smallest dose possible. This will help lower the risk of side effects. · If you cannot swallow the capsule, you may open it and pour the medicine into a teaspoon of applesauce. Stir the mixture well and swallow right away. Drink enough water to make sure you swallow all of the medicine. · This medicine should come with a Medication Guide. Ask your pharmacist for a copy if you do not have one. · Missed dose: Take a dose as soon as you remember. If it is almost time for your next dose, wait until then and take a regular dose. Do not take extra medicine to make up for a missed dose. · Store the medicine in a closed container at room temperature, away from heat, moisture, and direct light. Any medicine that has been mixed with applesauce may be stored in a refrigerator and used within 6 hours. Drugs and Foods to Avoid: Ask your doctor or pharmacist before using any other medicine, including over-the-counter medicines, vitamins, and herbal products. · Some medicines and foods can affect how celecoxib works. Tell your doctor if you are taking any of the following: ¨ A blood thinner, such as warfarin ¨ A diuretic (water pill), such as furosemide, hydrochlorothiazide (HCTZ), torsemide ¨ Blood pressure medicine, such as enalapril, lisinopril, losartan, olmesartan, valsartan ¨ Fluconazole ¨ Lithium ¨ Other pain or arthritis medicine, such as aspirin, diclofenac, ibuprofen, naproxen ¨ Steroid medicine, such as hydrocortisone, methylprednisolone, prednisone · Do not drink alcohol while you are using this medicine. Warnings While Using This Medicine: · Tell your doctor if you are pregnant or breastfeeding. Do not use this medicine during the later part of pregnancy, unless your doctor tells you to. · Tell your doctor if you have a history of ulcers or other stomach problems, kidney or liver disease, anemia, aspirin-sensitive asthma, high blood pressure, congestive heart failure, or other heart or circulation problems. · This medicine may cause the following problems: ¨ A serious liver problem ¨ Bleeding in your stomach or intestines ¨ Increased risk for a heart attack or stroke ¨ Risk for disseminated intravascular coagulation (bleeding problem) in children younger than 18 years · Tell any doctor or dentist who treats you that you are using this medicine. · Your doctor will do lab tests at regular visits to check on the effects of this medicine. Keep all appointments. · Keep all medicine out of the reach of children. Never share your medicine with anyone. Possible Side Effects While Using This Medicine:  
Call your doctor right away if you notice any of these side effects: · Allergic reaction: Itching or hives, swelling in your face or hands, swelling or tingling in your mouth or throat, chest tightness, trouble breathing · Blistering, peeling, red skin rash · Bloody or black, tarry stools · Change in how much or how often you urinate, bloody or cloudy urine · Chest pain, shortness of breath, or coughing up blood · Fast or slow heartbeat · Dark urine or pale stools, nausea, vomiting, loss of appetite, stomach pain, yellow skin or eyes · Numbness or weakness in your arm or leg, or on one side of your body · Pain in your calf · Shortness of breath, cold sweat, and bluish skin · Sudden or severe headache, dizziness, or problems with vision, speech, or walking · Swelling in your hands, ankles, or feet, rapid weight gain · Unusual tiredness or weakness, pale skin · Vomiting blood or material that looks like coffee grounds If you notice these less serious side effects, talk with your doctor: · Mild skin rash · Muscle or joint pain If you notice other side effects that you think are caused by this medicine, tell your doctor. Call your doctor for medical advice about side effects. You may report side effects to FDA at 4-593-OZV-1818 © 2017 [address] Information is for End User's use only and may not be sold, redistributed or otherwise used for commercial purposes. The above information is an  only. It is not intended as medical advice for individual conditions or treatments. Talk to your doctor, nurse or pharmacist before following any medical regimen to see if it is safe and effective for you. Iron Supplements (By mouth) Treats low blood iron or anemia by helping your body make red blood cells. Brand Name(s): Beef/Iron/Wine, Bifera, BiferaRx, Corvite FE, Duofer, EZFE 200, Enfamil Brian-In-Sol, [facility] Iron Tablets, Fe-20, Femcon Fe, Femiron, Feosol, Brian-Iron, [place], [facility] There may be other brand names for this medicine. When This Medicine Should Not Be Used: You should not use this medicine if you have had an allergic reaction to iron supplements, or if you have a condition called hemachromatosis (iron overload disease) or hemosiderosis (iron in the lungs), or any type of anemia that is not caused by iron deficiency. How to Use This Medicine:  
Liquid Filled Capsule, Coated Tablet, Tablet, Capsule, Chewable Tablet, Liquid, Long Acting Capsule, Long Acting Tablet · Your doctor will tell you how much of this medicine to take and how often.  Do not take more medicine or take it more often than your doctor tells you to. Carefully follow your doctor's instructions about any special diet. · It is best to take this medicine on an empty stomach, 1 hour before or 2 hours after a meal. Take the medicine with a full glass or water or fruit juice. If the medicine upsets your stomach, you may take it with food. · The chewable tablet must be chewed or crushed before you swallow it. · Measure the oral liquid medicine with a marked measuring spoon or medicine cup. · The oral liquid may stain your teeth. These stains can be prevented by mixing the medicine with water or other liquids (such as fruit juice, tomato juice), and drinking the medicine with a straw. To remove any iron stains, brush your teeth with baking soda or peroxide. If a dose is missed: · If you miss a dose or forget to take your medicine, take it as soon as you can. If it is almost time for your next dose, wait until then to take the medicine and skip the missed dose. · Do not use extra medicine to make up for a missed dose. How to Store and Dispose of This Medicine: · Store the medicine at room temperature, away from heat, moisture, and direct light. · Keep all medicine away from children, and never share your medicine with anyone. Drugs and Foods to Avoid: Ask your doctor or pharmacist before using any other medicine, including over-the-counter medicines, vitamins, and herbal products. · Do not take iron supplements by mouth if you are also receiving iron injections. · Make sure your doctor knows if you are also using phenytoin (Dilantin®), acetohydroxamic acid (Lithostat®), or antibiotics such as demeclocycline, doxycycline (Vibramycin®), Cipro®, Levaquin®, minocycline, moxifloxacin (Avelox®), Tequin®, or tetracycline. · Tell your doctor if you are using antacids (such as Maalox® or Mylanta®).  
· Avoid the following foods, or eat them in small amounts at least 1 hour before or 2 hours after taking your iron: eggs, milk, cheese, yogurt, tea or coffee, whole-grain cereals, and breads. Warnings While Using This Medicine: · Make sure your doctor knows if you are pregnant or breastfeeding, or if you have stomach or intestinal problems, an active infection, diabetes, porphyria, or other medical problems. · Make sure any doctor or dentist who treats you knows that you are using this medicine. Iron may affect the results of certain medical tests. · Iron can cause your stools to be darker in color. This is normal and is not a cause for concern. Possible Side Effects While Using This Medicine:  
Call your doctor right away if you notice any of these side effects: · Bloody diarrhea · Bluish-colored lips, hands, or fingernails · Chest pain · Fever · Pale or clammy skin · Severe or continuing stomach cramps, vomiting (with or without blood) · Shallow breathing, weakness, weak but fast heartbeat If you notice these less serious side effects, talk with your doctor: · Constipation, diarrhea, nausea · Dark-colored urine · Leg cramps If you notice other side effects that you think are caused by this medicine, tell your doctor. Call your doctor for medical advice about side effects. You may report side effects to FDA at 8-894-FDA-9210 © 2017 [address] Information is for End User's use only and may not be sold, redistributed or otherwise used for commercial purposes. The above information is an  only. It is not intended as medical advice for individual conditions or treatments. Talk to your doctor, nurse or pharmacist before following any medical regimen to see if it is safe and effective for you. Oxycodone/Acetaminophen (By mouth) Acetaminophen (x-bnut-h-MIN-oh-fen), Oxycodone Hydrochloride (bg-g-VMU-done matias-droe-KLOR-bijan) Treats moderate to moderately severe pain. This medicine is a narcotic pain reliever.   
Brand Name(s): Endocet, Percocet, Primlev, Xartemis XR  
 There may be other brand names for this medicine. When This Medicine Should Not Be Used: This medicine is not right for everyone. Do not use it if you had an allergic reaction to acetaminophen or oxycodone, or if you have serious breathing problems or paralytic ileus. How to Use This Medicine:  
Capsule, Liquid, Tablet, Long Acting Tablet · Your doctor will tell you how much medicine to use. Do not use more than directed. · An overdose can be dangerous. Follow directions carefully so you do not get too much medicine at one time. · Oral liquid: Measure the oral liquid medicine with a marked measuring spoon, oral syringe, or medicine cup. · Swallow the extended-release tablet whole. Do not crush, break, or chew it. Do not lick or wet the tablet before placing it in your mouth. Do not give this medicine through a feeding tube. · This medicine should come with a Medication Guide. Ask your pharmacist for a copy if you do not have one. · Missed dose: If you miss a dose of this medicine, skip the missed dose and go back to your regular dosing schedule. Do not double doses. · Store the medicine in a closed container at room temperature, away from heat, moisture, and direct light. Ask your pharmacist about the best way to dispose of medicine you do not use. Drugs and Foods to Avoid: Ask your doctor or pharmacist before using any other medicine, including over-the-counter medicines, vitamins, and herbal products. · Do not use Xartemis XR if you are using or have used an MAO inhibitor in the past 14 days. · Some medicines can affect how this medicine works. Tell your doctor if you are using any of the following: ¨ Carbamazepine, erythromycin, ketoconazole, lamotrigine, mirtazapine, naltrexone, phenytoin, propranolol, rifampin, ritonavir, tramadol, trazodone, or zidovudine ¨ Birth control pills ¨ Diuretic (water pill) ¨ Medicine to treat depression ¨ Phenothiazine medicine ¨ Triptan medicine to treat migraine headaches · Do not drink alcohol while you are using this medicine. Acetaminophen can damage your liver, and alcohol can increase this risk. Do not take acetaminophen without asking your doctor if you have 3 or more drinks of alcohol every day. · Tell your doctor if you use anything else that makes you sleepy. Some examples are allergy medicine, narcotic pain medicine, and alcohol. Tell your doctor if you are using buprenorphine, butorphanol, nalbuphine, pentazocine, a benzodiazepine, or a muscle relaxer. Warnings While Using This Medicine: · Tell your doctor if you are pregnant or breastfeeding, or if you have kidney disease, liver disease, heart disease, low blood pressure, breathing problems or lung disease (such as asthma, COPD), thyroid problems, Ricardo disease, pancreas or gallbladder problems, prostate problems, trouble urinating, or a stomach problems, or a history of head injury or brain damage, seizures, or alcohol or drug abuse. Tell your doctor if you are allergic to codeine. · This medicine may cause the following problems: 
¨ High risk of overdose, which can lead to death ¨ Respiratory depression (serious breathing problem that can be life-threatening) ¨ Liver problems ¨ Serious skin reactions ¨ Serotonin syndrome (when used with certain medicines) · This medicine may make you dizzy or drowsy. Do not drive or do anything that could be dangerous until you know how this medicine affects you. Sit or lie down if you feel dizzy. Stand up carefully. · This medicine contains acetaminophen. Read the labels of all other medicines you are using to see if they also contain acetaminophen, or ask your doctor or pharmacist. Dane Coronado not use more than 4 grams (4,000 milligrams) total of acetaminophen in one day. · This medicine can be habit-forming. Do not use more than your prescribed dose. Call your doctor if you think your medicine is not working. · Do not stop using this medicine suddenly. Your doctor will need to slowly decrease your dose before you stop it completely. · This medicine could cause infertility. Talk with your doctor before using this medicine if you plan to have children. · This medicine may cause constipation, especially with long-term use. Ask your doctor if you should use a laxative to prevent and treat constipation. · Keep all medicine out of the reach of children. Never share your medicine with anyone. Possible Side Effects While Using This Medicine:  
Call your doctor right away if you notice any of these side effects: · Allergic reaction: Itching or hives, swelling in your face or hands, swelling or tingling in your mouth or throat, chest tightness, trouble breathing · Anxiety, restlessness, fast heartbeat, fever, muscle spasms, twitching, diarrhea, seeing or hearing things that are not there · Blistering, peeling, red skin rash · Blue lips, fingernails, or skin · Dark urine or pale stools, loss of appetite, stomach pain, yellow skin or eyes · Extreme weakness, shallow breathing, uneven heartbeat, seizures, sweating, or cold or clammy skin · Severe confusion, lightheadedness, dizziness, or fainting · Severe constipation, nausea, or vomiting · Trouble breathing or slow breathing If you notice these less serious side effects, talk with your doctor:  
· Headache · Mild constipation, nausea, or vomiting · Mild sleepiness or drowsiness If you notice other side effects that you think are caused by this medicine, tell your doctor. Call your doctor for medical advice about side effects. You may report side effects to FDA at 0-902-PDQ-6475 © 2017 [address] Information is for End User's use only and may not be sold, redistributed or otherwise used for commercial purposes. The above information is an  only.  It is not intended as medical advice for individual conditions or treatments. Talk to your doctor, nurse or pharmacist before following any medical regimen to see if it is safe and effective for you. Please provide this summary of care documentation to your next provider. Signatures-by signing, you are acknowledging that this After Visit Summary has been reviewed with you and you have received a copy. Patient Signature:  ____________________________________________________________ Date:  ____________________________________________________________  
  
[facility] Provider Signature:  ____________________________________________________________ Date:  ____________________________________________________________

## 2018-04-17 NOTE — PERIOP NOTES
TRANSFER - OUT REPORT:    Verbal report given to Jo-Ann DE LAC RUZ(name) on Trevin Clemente  being transferred to [address] [place] [address](unit) for routine post - op       Report consisted of patients Situation, Background, Assessment and   Recommendations(SBAR). Information from the following report(s) SBAR, Kardex, OR Summary, Procedure Summary, Intake/Output, MAR, Recent Results and Med Rec Status was reviewed with the receiving nurse. Lines:   Peripheral IV 04/17/18 Left Wrist (Active)   Site Assessment Clean, dry, & intact 4/17/2018  9:36 AM   Phlebitis Assessment 0 4/17/2018  9:36 AM   Infiltration Assessment 0 4/17/2018  9:36 AM   Dressing Status Clean, dry, & intact 4/17/2018  9:36 AM   Dressing Type Tape;Transparent 4/17/2018  9:36 AM   Hub Color/Line Status Pink; Infusing 4/17/2018  9:36 AM        Opportunity for questions and clarification was provided.       Patient transported with:   O2 @ 2 liters  Tech

## 2018-04-17 NOTE — BRIEF OP NOTE
BRIEF OPERATIVE NOTE    Date of Procedure: 4/17/2018   Preoperative Diagnosis: Osteoarthritis of right knee, unspecified osteoarthritis type [M17.11]  Postoperative Diagnosis: Osteoarthritis of right knee, unspecified osteoarthritis type [M17.11]    Procedure(s):  RIGHT TOTAL KNEE ARTHROPLASTY  Surgeon(s) and Role:     * Phyliss Habermann, MD - Primary         Surgical Assistant: see below    Surgical Staff:  Circ-1: Juan C Green RN  Physician Assistant: Lopez Shah PA-C  Scrub Tech-1: Silvia Flores  Surg Asst-1: [facility] Bench  Event Time In   Incision Start 0810   Incision Close      Anesthesia: General   Estimated Blood Loss: 50ml  Specimens: * No specimens in log *   Findings: same   Complications: none  Implants:   Implant Name Type Inv.  Item Serial No.  Lot No. LRB No. Used Action   CEMENT BNE SIMPLEX TOBRA 4 --  - [id number]  CEMENT BNE SIMPLEX TOBRA 4 --   [employer] HOW [id number] 1 Implanted   CEMENT BNE SIMPLEX TOBRA 4 --  - [id number]  CEMENT BNE SIMPLEX TOBRA 4 --   [employer] HOW [id number] Right 1 Implanted   PAT ASYM TRITHLON X3 [id number] -- TRIATHLON ASYMMETRIC X3 - [id number]  PAT ASYM TRITHLON X3 [id number] -- TRIATHLON ASYMMETRIC X3  [employer] HOW [id number] Right 1 Implanted   BASEPLT TIB UNIV TRIATHLN 3 --  - [id number]  BASEPLT TIB UNIV TRIATHLN 3 --   [employer] HOW [id number] Right 1 Implanted   COMPNT FEM PS LINSEY TRIATHLN 3 R --  - [id number]  COMPNT FEM PS LINSEY TRIATHLN 3 R --   [employer] HOW [id number] Right 1 Implanted   INSERT TIB X3 PLOY SZ 3 9MM -- TRIATHION TS PLUS - [id number]   INSERT TIB X3 PLOY SZ 3 9MM -- TRIATHION TS PLUS   [employer] HOW [id number] Right 1 Implanted

## 2018-04-17 NOTE — ADDENDUM NOTE
Addendum  created 04/17/18 1318 by Gill Balderas CRNA    Anesthesia Intra Meds edited, Orders acknowledged in Narrator

## 2018-04-18 ENCOUNTER — HOME HEALTH ADMISSION (OUTPATIENT)
Dept: HOME HEALTH SERVICES | Facility: HOME HEALTH | Age: 73
End: 2018-04-18
Payer: MEDICARE

## 2018-04-18 VITALS
HEART RATE: 73 BPM | WEIGHT: 122 LBS | DIASTOLIC BLOOD PRESSURE: 70 MMHG | SYSTOLIC BLOOD PRESSURE: 116 MMHG | HEIGHT: 60 IN | OXYGEN SATURATION: 99 % | TEMPERATURE: 98.6 F | RESPIRATION RATE: 18 BRPM | BODY MASS INDEX: 23.95 KG/M2

## 2018-04-18 PROCEDURE — 97116 GAIT TRAINING THERAPY: CPT

## 2018-04-18 PROCEDURE — 97165 OT EVAL LOW COMPLEX 30 MIN: CPT

## 2018-04-18 PROCEDURE — 74011250636 HC RX REV CODE- 250/636: Performed by: ORTHOPAEDIC SURGERY

## 2018-04-18 PROCEDURE — 97535 SELF CARE MNGMENT TRAINING: CPT

## 2018-04-18 PROCEDURE — 74011250637 HC RX REV CODE- 250/637: Performed by: ORTHOPAEDIC SURGERY

## 2018-04-18 RX ORDER — LANOLIN ALCOHOL/MO/W.PET/CERES
325 CREAM (GRAM) TOPICAL 2 TIMES DAILY WITH MEALS
Qty: 60 TAB | Refills: 2 | Status: SHIPPED | OUTPATIENT
Start: 2018-04-18 | End: 2018-07-10

## 2018-04-18 RX ORDER — GUAIFENESIN 100 MG/5ML
243 LIQUID (ML) ORAL 2 TIMES DAILY
Qty: 60 TAB | Refills: 0 | Status: SHIPPED | OUTPATIENT
Start: 2018-04-18 | End: 2019-09-05

## 2018-04-18 RX ORDER — CELECOXIB 200 MG/1
200 CAPSULE ORAL EVERY 12 HOURS
Qty: 60 CAP | Refills: 2 | Status: SHIPPED | OUTPATIENT
Start: 2018-04-18 | End: 2018-07-17

## 2018-04-18 RX ORDER — OXYCODONE AND ACETAMINOPHEN 7.5; 325 MG/1; MG/1
1-2 TABLET ORAL
Qty: 60 TAB | Refills: 0 | Status: SHIPPED | OUTPATIENT
Start: 2018-04-18 | End: 2018-05-17

## 2018-04-18 RX ADMIN — ACETAMINOPHEN 1000 MG: 500 TABLET, FILM COATED ORAL at 09:47

## 2018-04-18 RX ADMIN — ASPIRIN 81 MG 243 MG: 81 TABLET ORAL at 09:48

## 2018-04-18 RX ADMIN — OXYCODONE HYDROCHLORIDE 10 MG: 5 TABLET ORAL at 15:46

## 2018-04-18 RX ADMIN — DOCUSATE SODIUM 100 MG: 100 CAPSULE, LIQUID FILLED ORAL at 09:48

## 2018-04-18 RX ADMIN — THERA TABS 1 TABLET: TAB at 09:48

## 2018-04-18 RX ADMIN — CEFAZOLIN SODIUM 2 G: 2 SOLUTION INTRAVENOUS at 05:21

## 2018-04-18 RX ADMIN — OXYCODONE HYDROCHLORIDE 5 MG: 5 TABLET ORAL at 05:33

## 2018-04-18 RX ADMIN — Medication 10 ML: at 05:25

## 2018-04-18 RX ADMIN — CELECOXIB 200 MG: 100 CAPSULE ORAL at 05:24

## 2018-04-18 RX ADMIN — FERROUS SULFATE TAB 325 MG (65 MG ELEMENTAL FE) 325 MG: 325 (65 FE) TAB at 09:48

## 2018-04-18 RX ADMIN — ACETAMINOPHEN 1000 MG: 500 TABLET, FILM COATED ORAL at 14:03

## 2018-04-18 RX ADMIN — Medication 250 MG: at 09:48

## 2018-04-18 RX ADMIN — OXYCODONE HYDROCHLORIDE 10 MG: 5 TABLET ORAL at 09:47

## 2018-04-18 RX ADMIN — Medication 10 ML: at 14:00

## 2018-04-18 NOTE — PROGRESS NOTES
Ortho    Pt. Seen and evaluated. Doing well, pain well controlled, progressed well and met all goal with PT  Denies cp, sob, abd pain    Blood pressure 136/67, pulse 83, temperature 98.7 °F (37.1 °C), resp. rate 18, height 5' (1.524 m), weight 122 lb (55.3 kg), SpO2 95 %. rightKnee woundclean, dry, no drainage  Sensory intact to LT  Motor intact  nv intact  Neg calf tenderness    Labs:  CBC  @  CBC:   Lab Results   Component Value Date/Time    WBC 5.1 04/05/2018 02:30 PM    RBC 5.08 04/05/2018 02:30 PM    HGB 14.5 04/05/2018 02:30 PM    HCT 43.6 04/05/2018 02:30 PM    PLATELET 881 94/59/5250 02:30 PM     BMP:   Lab Results   Component Value Date/Time    Glucose 101 (H) 04/05/2018 02:30 PM    Sodium 134 (L) 04/05/2018 02:30 PM    Potassium 3.8 04/05/2018 02:30 PM    Chloride 96 (L) 04/05/2018 02:30 PM    CO2 29 04/05/2018 02:30 PM    BUN 11 04/05/2018 02:30 PM    Creatinine 0.56 (L) 04/05/2018 02:30 PM    Calcium 9.1 04/05/2018 02:30 PM   @  Coagulation  Lab Results   Component Value Date    INR 1.0 04/05/2018    APTT 31.9 04/05/2018      Basic Metabolic Profile  Lab Results   Component Value Date     (L) 04/05/2018    CO2 29 04/05/2018    BUN 11 04/05/2018       Assesment: rightOrthopedic / Rheumatologic: Total Knee Replacement  Past Medical History:   Diagnosis Date    Hypertension      ASA: 2    Status post joint replacement pt with risk of bleeding, blood clots, and infection.     Plan: aspirin, PT, home today if safe with PT

## 2018-04-18 NOTE — HOME CARE
[facility] received referral for SN/PT - [place] Knee Protocol - discharge orders noted. This nurse verified address & contact numbers. Patient lives with her spouse and her son, Mahesh Rose, is going to assist the patient initially. DME: per patient, she has a front wheeled walker at home - the patient is aware she will need to purchase a shower chair out of pocket due to medicare not covering. Per the patient, she will find something else to use. Referral called to central intake for completion and visit scheduling - JUAN R Stewart LPN

## 2018-04-18 NOTE — ROUTINE PROCESS
Patient sitting in the recliner AAOx4 , talking with family members. Patient reported no pain at this timem no respiratory distress noted. Surgical dressing to  Right knee dry and clean hemovac drain place draining sanguinous liquid, CMS intact , able to wiggle toes ,pedal pulses palpable. Patient ambulated to bathroom tolerated well. IV fluids infusing well iv site no sings of infiltration noted.

## 2018-04-18 NOTE — DISCHARGE SUMMARY
4/17/2018  5:34 AM    4/18/2018, 10:18 AM    Primary Dx:right Orthopedic / Rheumatologic: Total Knee Replacement  Secondary Dx: Etiological Diagnoses: none    HPI:  Pt has end stage OA and had failed conservative treatment. Due to the current findings and affected activity of daily living surgical intervention is indicated.   The alternatives, risks, complications as well as expected outcome were discussed, the patient understands and wishes to proceed with surgery    Past Medical History:   Diagnosis Date    Hypertension          Current Facility-Administered Medications:     therapeutic multivitamin (THERAGRAN) tablet 1 Tab, 1 Tab, Oral, DAILY, Jason Mason MD, 1 Tab at 04/18/18 0948    magnesium oxide tablet 250 mg, 250 mg, Oral, BID, Jason Mason MD, 250 mg at 04/18/18 0948    0.9% sodium chloride infusion, 75 mL/hr, IntraVENous, CONTINUOUS, Jason Mason MD, Last Rate: 75 mL/hr at 04/17/18 1310, 75 mL/hr at 04/17/18 1310    sodium chloride (NS) flush 5-10 mL, 5-10 mL, IntraVENous, Q8H, Jason Mason MD, 10 mL at 04/18/18 0525    sodium chloride (NS) flush 5-10 mL, 5-10 mL, IntraVENous, PRN, Jason Mason MD    naloxone [facility]) injection 0.4 mg, 0.4 mg, IntraVENous, PRN, Jason Mason MD    ferrous sulfate tablet 325 mg, 1 Tab, Oral, BID WITH MEALS, Jason Mason MD, 325 mg at 04/18/18 0948    diphenhydrAMINE (BENADRYL) injection 12.5 mg, 12.5 mg, IntraVENous, Q6H PRN, Jason Mason MD    zolpidem (AMBIEN) tablet 5 mg, 5 mg, Oral, QHS PRN, Jason Mason MD    acetaminophen (TYLENOL) tablet 1,000 mg, 1,000 mg, Oral, QID, Jason Mason MD, 1,000 mg at 04/18/18 0947    oxyCODONE IR (ROXICODONE) tablet 5-10 mg, 5-10 mg, Oral, Q3H PRN, Jason Mason MD, 10 mg at 04/18/18 0947    ondansetron (ZOFRAN) injection 4 mg, 4 mg, IntraVENous, Q4H PRN, Jason Mason MD, 4 mg at 04/17/18 2220    docusate sodium (COLACE) capsule 100 mg, 100 mg, Oral, BID, Jason Mason MD, 100 mg at 04/18/18 0948    celecoxib (CELEBREX) capsule 200 mg, 200 mg, Oral, Q12H, Alfonso Man MD, 200 mg at 04/18/18 0524    aspirin chewable tablet 243 mg, 243 mg, Oral, BID, Alfonso Man MD, 243 mg at 04/18/18 8781      Review of patient's allergies indicates no known allergies. Physical Exam:  General A&O x3 NAD, well developed, well nourished, normal affect  Heart: S1-S2, RRR  Lungs: CTA Bilat  Abd: soft NT, ND  Ext: n/v intact    Hospital Course:    Pt. Had rightOrthopedic / Rheumatologic: Total Knee Replacement    Post -op Course: The patient tolerated the procedure well. They were followed by internal medicine for help with medical management. Pt. Was place on Abx pre and post-op for prophylaxis against infection as well as coumadin pre and post-op for prophylaxis against DVT. Vitals signs remained stable, remained af. The wound wasclean, dry, no drainage. Pain was well controlled. Pt. Had negative calf tenderness or swelling, no evidence for DVT. Patient had PT/OT consult for evaluation and treatment. CBC  Lab Results   Component Value Date/Time    WBC 5.1 04/05/2018 02:30 PM    RBC 5.08 04/05/2018 02:30 PM    HCT 43.6 04/05/2018 02:30 PM    MCV 85.8 04/05/2018 02:30 PM    MCH 28.5 04/05/2018 02:30 PM    MCHC 33.3 04/05/2018 02:30 PM    RDW 13.0 04/05/2018 02:30 PM     Coagulation  Lab Results   Component Value Date    INR 1.0 04/05/2018    APTT 31.9 04/05/2018      Basic Metabolic Profile  Lab Results   Component Value Date     (L) 04/05/2018    CO2 29 04/05/2018    BUN 11 04/05/2018       Discharge Plan:  The patient will be d/c'd to home, total knee protocol, WBAT. She will have [facility] PT and nursing. Total joint protocol. Pt safe for homebound transfer, sp Total joint replacement. A walker, bedside commode, and shower chair will be utilized for ADL's. Follow up with Dr. Gabe Stephens in 10-12 days. Call with any questions or concerns.

## 2018-04-18 NOTE — DISCHARGE INSTRUCTIONS
DISCHARGE SUMMARY from Nurse    The following personal items collected during your admission are returned to you:   Dental Appliance: Dental Appliances: None  Vision: Visual Aid: None  Hearing Aid:    Jewelry: Jewelry: None  Clothing: Clothing: Jacket/Coat, Sweater, Socks, Footwear, Shirt, Undergarments, Pants  Other Valuables:    Valuables sent to safe:              PATIENT INSTRUCTIONS:    After general anesthesia or intravenous sedation, for 24 hours or while taking prescription Narcotics:  · Limit your activities  · Do not drive and operate hazardous machinery  · Do not make important personal or business decisions  · Do  not drink alcoholic beverages  · If you have not urinated within 8 hours after discharge, please contact your surgeon on call. Report the following to your surgeon:  · Excessive pain, swelling, redness or odor of or around the surgical area  · Temperature over 100.5  · Nausea and vomiting lasting longer than 4 hours or if unable to take medications  · Any signs of decreased circulation or nerve impairment to extremity: change in color, persistent  numbness, tingling, coldness or increase pain  · Any questions      Follow-up Appointments   Procedures    FOLLOW UP VISIT Appointment in: Two Weeks     Standing Status:   Standing     Number of Occurrences:   1     Order Specific Question:   Appointment in     Answer: Two Weeks       What to do at Home:            *  Please give a list of your current medications to your Primary Care Provider. *  Please update this list whenever your medications are discontinued, doses are      changed, or new medications (including over-the-counter products) are added. *  Please carry medication information at all times in case of emergency situations.           These are general instructions for a healthy lifestyle:    No smoking/ No tobacco products/ Avoid exposure to second hand smoke    Surgeon General's Warning:  Quitting smoking now greatly reduces serious risk to your health. Obesity, smoking, and sedentary lifestyle greatly increases your risk for illness    A healthy diet, regular physical exercise & weight monitoring are important for maintaining a healthy lifestyle    You may be retaining fluid if you have a history of heart failure or if you experience any of the following symptoms:  Weight gain of 3 pounds or more overnight or 5 pounds in a week, increased swelling in our hands or feet or shortness of breath while lying flat in bed. Please call your doctor as soon as you notice any of these symptoms; do not wait until your next office visit. Recognize signs and symptoms of STROKE:    F-face looks uneven    A-arms unable to move or move unevenly    S-speech slurred or non-existent    T-time-call 911 as soon as signs and symptoms begin-DO NOT go       Back to bed or wait to see if you get better-TIME IS BRAIN. The discharge information has been reviewed with the patient. The patient verbalized understanding. [employer] Activation    Thank you for requesting access to [employer]. Please follow the instructions below to securely access and download your online medical record. [employer] allows you to send messages to your doctor, view your test results, renew your prescriptions, schedule appointments, and more. How Do I Sign Up? 1. In your internet browser, go to [email or web address]. 2. Click on the First Time User? Click Here link in the Sign In box. You will see the New Member Sign Up page. 3. Enter your [employer] Access Code exactly as it appears below. You will not need to use this code after youve completed the sign-up process. If you do not sign up before the expiration date, you must request a new code. [employer] Access Code: [email or web address]  Expires: 2012  9:42 AM (This is the date your [facility] access code will )    4.  Enter the last four digits of your Social Security Number (xxxx) and Date of Birth (mm/dd/yyyy) as indicated and click Submit. You will be taken to the next sign-up page. 5. Create a [employer] ID. This will be your [employer] login ID and cannot be changed, so think of one that is secure and easy to remember. 6. Create a [employer] password. You can change your password at any time. 7. Enter your Password Reset Question and Answer. This can be used at a later time if you forget your password. 8. Enter your e-mail address. You will receive e-mail notification when new information is available in [address] 9. Click Sign Up. You can now view and download portions of your medical record. 10. Click the Download Summary menu link to download a portable copy of your medical information. Additional Information    If you have questions, please visit the Frequently Asked Questions section of the [employer] website at [email or web address] Remember, [employer] is NOT to be used for urgent needs. For medical emergencies, dial 911. Armband removed and shredded    Discharge Instructions for Total Knee Replacement Patients    · The dressing on your knee will be changed by the Home Health professional at the appropriate time. Keep your incision clean and dry. Do not apply any ointments to the incision. · You may shower as long as you keep you incision dry. When showering, leave your dressing on. The dressing is waterproof as long as the edges are sealed. · Notify your surgeon if:  · Your temperature is greater than 100.5  · You have pain not controlled by your pain medication  · You have increased drainage from your incision  · You have increased redness or swelling in your leg  · You have chest pain, shortness of breath, or any other problems    · Do your exercises as instructed by the home physical therapist.    · Bend and straighten your operative leg every hour. Walk once an hour during normal walking hours.     · During periods of inactivity or rest, your leg should be elevated with a rolled towel or folded pillow under the heel to keep the knee straight. · Do not place anything under the knee. · Do not sit in a recliner chair with the footrest elevated. · You may use ice to your knee for 20-30 minutes after exercise and as needed. Do not apply the ice pack directly to your skin. Use a barrier such as your pant leg or a thin towel. · If you have NADER hose (the white support stockings), remove them at bedtime and re-apply the hose in the morning for the next 2 weeks. Best of luck with your new knee and [address] YOU for choosing the [address]!

## 2018-04-18 NOTE — PROGRESS NOTES
Hemovac drain dc'd from right knee. 4x4 gauze applied over old drain site. Aquacel Ag dressing intact to knee incision. No drainage noted. Ice pack reapplied to knee. Tolerated well by patient.

## 2018-04-18 NOTE — PROGRESS NOTES
Progress Note  Pulmonary, Critical Care, and Sleep Medicine    Name: Denver Marie MRN: [id number]   : 1945 Hospital: [address] [place] [address]   Date: 2018        IMPRESSION:   · End stage Osteoarthritis S/p right TKR  · HTN controlled  · Hiatal hernia  · Constipation       PLAN:   · Pain well controlled, defer to Ortho  · Continue post op progression of care  · Encouraged incentive spirometry  · Bronchial hygiene protocol  · Aspiration precautions  · DVT prophylaxis per Ortho  · Mobilize per PT/OT     Subjective/Interval History:   I have reviewed the flowsheet and previous days notes. Reviewed interval history. Discussed management with nursing staff. Pt participating in PT/OT. Reports good pain control. No new complaints      ROS:Pertinent items are noted in HPI. Orders reviewed including medications. Changes made if indicated. Telemetry monitor reviewed at the bedside. Objective:   Vital Signs:    Visit Vitals    /63 (BP 1 Location: Right arm, BP Patient Position: At rest)    Pulse 76    Temp 97.9 °F (36.6 °C)    Resp 18    Ht 5' (1.524 m)    Wt 55.3 kg (122 lb)    SpO2 100%    BMI 23.83 kg/m2       O2 Device: Nasal cannula   O2 Flow Rate (L/min): 2 l/min   Temp (24hrs), Av °F (36.7 °C), Min:97.6 °F (36.4 °C), Max:98.6 °F (37 °C)       Intake/Output:   Last shift:       0701 -  1900  In: 1561.3 [P.O.:120; I.V.:1441.3]  Out: -   Last 3 shifts:  190 -  0700  In: 8415 [P.O.:220; I.V.:1600]  Out: 1595 [Urine:1450; Drains:145]    Intake/Output Summary (Last 24 hours) at 18 0958  Last data filed at 18 9297   Gross per 24 hour   Intake          2081.25 ml   Output             1595 ml   Net           486.25 ml        Physical Exam:    General:  Alert, cooperative, in no distress, appears stated age. Head:  Normocephalic, without obvious abnormality, atraumatic.    Eyes:  ANicteric, PERRL,   Nose: Nares normal. Mucosa normal. No drainage or sinus tenderness. Throat: Lips, mucosa, and tongue normal.  NO Thrush   Neck: Supple, symmetrical, trachea midline, no adenopathy, thyroid: no enlargment/tenderness/nodules    Back:   Symmetric    Lungs:   Bilateral auscultation no rales wheezes or rhonchi   Chest wall:  No tenderness or deformity. NO intercostal retractions   Heart:  Regular rate and rhythm, S1, S2 normal, no murmur, click, rub or gallop. Abdomen:   Soft, non-tender. Bowel sounds normal. No masses,  No organomegaly. NO paradoxical motion   Extremities: normal, atraumatic, no cyanosis or edema. Pulses: 2+ and symmetric all extremities. Skin: Skin color, texture, turgor normal. No rashes or lesions. NO clubbing   Lymph nodes: Cervical nodes normal.   Neurologic: Grossly nonfocal      :        Devices:             Drips:    DATA:  Labs:  No results for input(s): WBC, HGB, HCT, PLT, HGBEXT, HCTEXT, PLTEXT in the last 72 hours. No results for input(s): NA, K, CL, CO2, GLU, BUN, CREA, CA, MG, PHOS, ALB, TBIL, SGOT, ALT, INR in the last 72 hours. No lab exists for component: INREXT  No results for input(s): PH, PCO2, PO2, HCO3, FIO2 in the last 72 hours. Imaging:  []        I have personally reviewed the patients radiographs and reports  []         []        No change from prior, tubes and lines in adequate position  []        Improved   []        Worsening  High complexity decision making was performed during this consultation and evaluation.       Jaqueline Rowan MD

## 2018-04-18 NOTE — ADVANCED PRACTICE NURSE
Patient asleep but easily aroused, reported no pain , but  Gave roxicocode 5 mg for patient's requested, dressing dry and clean .

## 2018-04-18 NOTE — PROGRESS NOTES
Rounded on patient post total knee replacement. Activity: Reinforced importance of getting OOB for all meals, going to bathroom to help prevent blood clots. VTE prophylaxis: Instructed patient to use their SCD's when not up and walking. To use while in bed and in the chair. Educated re: ankle pumps to assist with circulation when in hospital and at home. Medications: Reviewed pain medications patient is taking and the importance of keeping pain under control to help with getting OOB and therapy. Reminded the patient to always eat a snack with their pain medication to help to prevent nausea. Encouraged patient to monitor for constipation and to take a stool softner/laxative while recovering and on pain medication. Incentive Spirometry: Reinforced use of incentive spirometer with return demonstration by patient. Wound Care: Dressing to surgical site covered with clean and dry ace wrap. Patient instructed not to take dressing off at home. Patient given CHG wash to use in hospital and at home. Stressed importance of using a clean towel and washcloth daily. Reminded to put on clean clothes and night clothes daily. Ice Protocol: Ice man machine in place per protocol. Patient Safety: Call light in reach. Patient  reminded to call for help toget OOB or when leaving bathroom for safety. Phone and other items also within reach per patient's request.     Diet: Educated patient on the importance of eating three well balanced meals a day with protein to promote bone/muscle healing. Reminded patient to drink lots of fluids to protect kidneys from all the medications being taken currently with recovery. Patient given educational material to remind them to continue doing everything at home to prevent complications and have a successful recovery. Patient  verbalized understand of all information and education discussed. Patient  given the opportunity for asking questions.

## 2018-04-18 NOTE — ROUTINE PROCESS
Patient received discharge information. Patient had opportunity ask questions. Patients IV and armbands removed and shredded. Patient dressed waiting on transport.

## 2018-04-18 NOTE — PROGRESS NOTES
Problem: Falls - Risk of  Goal: *Absence of Falls  Document Nikita Fall Risk and appropriate interventions in the flowsheet.    Outcome: Progressing Towards Goal  Fall Risk Interventions:  Mobility Interventions: Patient to call before getting OOB         Medication Interventions: Patient to call before getting OOB    Elimination Interventions: Call light in reach    History of Falls Interventions: Room close to nurse's station

## 2018-04-18 NOTE — PROGRESS NOTES
Care Management Interventions  Transition of Care Consult (CM Consult): Home Health ([facility])  [address] Yes  Physical Therapy Consult: Yes  Occupational Therapy Consult: Yes  Current Support Network: Lives with Spouse  Confirm Follow Up Transport: Family  Plan discussed with Pt/Family/Caregiver: Yes  Freedom of Choice Offered: Yes  Discharge Location  Discharge Placement: Home with home health    Orders for [facility], [address] given to patient and she selected [facility]. Referral sent via [facility] and called to liaison Hans Pruitt and they will follow patient. Patient verbalized no further needs at this time.

## 2018-04-18 NOTE — PROGRESS NOTES
Problem: Mobility Impaired (Adult and Pediatric)  Goal: *Acute Goals and Plan of Care (Insert Text)  Physical Therapy Goals  Initiated 4/17/2018 and to be accomplished within 7 day(s)  1. Patient will move from supine to sit and sit to supine , scoot up and down and roll side to side in bed with supervision/set-up. 2.  Patient will transfer from bed to chair and chair to bed with supervision/set-up using the least restrictive device. 3.  Patient will perform sit to stand with supervision/set-up. 4.  Patient will ambulate with supervision/set-up for 200 feet with the least restrictive device. 5.  Patient will ascend/descend 4 stairs with 1-2 handrail(s) with supervision/set-up. Outcome: Progressing Towards Goal  physical Therapy TREATMENT    Patient: Jeronimo Tay (43 y.o. female)  Date: 4/18/2018  Diagnosis: Osteoarthritis of right knee, unspecified osteoarthritis type [M17.11] <principal problem not specified>  Procedure(s) (LRB):  RIGHT TOTAL KNEE ARTHROPLASTY (Right) 1 Day Post-Op  Precautions: Fall, WBAT   Chart, physical therapy assessment, plan of care and goals were reviewed. OBJECTIVE/ ASSESSMENT:  Pt found sitting in b/s chair willing to work with PT. Pt present with quick movments and slight impulsivity, but no LOB noted throughout tx. Pt requires verbal cues to slow down during gait and educated to perform therex slowly to maximize muscle gain during eccentric contraction. Pt ambulated in hallway, increasing distance and is able to negotiate 4 total stair with bilateral handrails. Pt returned to supine in bed post ambulation. Pt's AROM in supine is 5º - 100º. Pt is limited by bandaging at this time. Pt has demonstrated the ability to enter her home and to ambulate household distances. Education: transfers, gait, therex.   Progression toward goals:  [x]      Improving appropriately and progressing toward goals  []      Improving slowly and progressing toward goals  []      Not making progress toward goals and plan of care will be adjusted     PLAN:  Patient continues to benefit from skilled intervention to address the above impairments. Continue treatment per established plan of care. Discharge Recommendations:  Home Health  Further Equipment Recommendations for Discharge:  rolling walker     SUBJECTIVE:   Patient stated Can I bend it while I'm standing up?     OBJECTIVE DATA SUMMARY:   Critical Behavior:  Neurologic State: Alert  Orientation Level: Oriented X4  Cognition: Decreased attention/concentration, Follows commands, Impulsive, Poor safety awareness  Safety/Judgement: Fall prevention, Decreased awareness of environment  Functional Mobility Training:  Bed Mobility:  Supine to Sit: Supervision  Sit to Supine: Supervision  Transfers:  Sit to Stand: Supervision  Stand to Sit: Supervision  Bed to Chair: Supervision  Balance:  Sitting: Intact  Standing: Impaired; With support  Standing - Static: Good  Standing - Dynamic : Fair (+)  Ambulation/Gait Training:  Distance (ft): 225 Feet (ft)  Assistive Device: Walker, rolling  Ambulation - Level of Assistance: Supervision  Gait Abnormalities: Antalgic;Decreased step clearance  Right Side Weight Bearing: As tolerated  Base of Support: Shift to left  Stance: Left increased  Speed/Peyton: Slow  Step Length: Left shortened;Right shortened    Therapeutic Exercises:   Reviewed. Pain:  Pre tx pain: 0  Post tx pain: 0     Activity Tolerance:   Fair  Please refer to the flowsheet for vital signs taken during this treatment. After treatment:   [] Patient left in no apparent distress sitting up in chair  [x] Patient left in no apparent distress in bed  [x] Call bell left within reach  [] Nursing notified  [] Caregiver present  [] Bed alarm activated  [] SCDs applied  [] Ice applied      Sony Aviles PTA   Time Calculation: 33 mins    Mobility  Current  CI= 1-19%.   The severity rating is based on the Level of Assistance required for Functional Mobility and ADLs. Mobility   Goal  CI= 1-19%. The severity rating is based on the Level of Assistance required for Functional Mobility and ADLs.

## 2018-04-18 NOTE — PROGRESS NOTES
Problem: Self Care Deficits Care Plan (Adult)  Goal: *Acute Goals and Plan of Care (Insert Text)  Outcome: Resolved/Met Date Met: 04/18/18  Occupational Therapy EVALUATION/discharge    Patient: Waqas So (33 y.o. female)  Date: 4/18/2018  Primary Diagnosis: Osteoarthritis of right knee, unspecified osteoarthritis type [M17.11]  Procedure(s) (LRB):  RIGHT TOTAL KNEE ARTHROPLASTY (Right) 1 Day Post-Op   Precautions:   Fall, WBAT    ASSESSMENT AND RECOMMENDATIONS:    JONO clevel[facility]'price pt for OT session. OT eval completed POD #1 s/p R  TKA. Educated in adaptive tech for increased safety and independence in ADL and related transfer. Following instruction pt completed ADL and related transfer at supervision level with vc. Pt is mildly impulsive/confused/anxious and requires frequent safety vc. Pt and family admit pt has baseline memory challenges and that safety cues are needed at baseline. Family was educated in appropriate cues to promote increased safety in ADL and transfer. Pt/family verbalized understanding of home safety recommendations and shower transfer tech. Pt to wait for [facility] to attempt shower transfer and family educated re: recommendation of shower chair. Pt to have assist of family PRN at discharge. Skilled occupational therapy is not indicated at this time. Discharge Recommendations: Home Health  Further Equipment Recommendations for Discharge: shower chair and N/A      Barriers to Learning/Limitations: None  Compensate with: visual, verbal, tactile, kinesthetic cues/model     COMPLEXITY     Eval Complexity: History: LOW Complexity : Brief history review ; Examination: LOW Complexity : 1-3 performance deficits relating to physical, cognitive , or psychosocial skils that result in activity limitations and / or participation restrictions ;  Decision Making:LOW Complexity : No comorbidities that affect functional and no verbal or physical assistance needed to complete eval tasks  Assessment: low Complexity G-CODES:     Self Care  Current  CI= 1-19%   Goal  CI= 1-19%   D/C  CI= 1-19%. The severity rating is based on the Level of Assistance required for Functional Mobility and ADLs. SUBJECTIVE:   Patient stated in addition to being old, I have some memory problems.     OBJECTIVE DATA SUMMARY:     Past Medical History:   Diagnosis Date    Hypertension      Past Surgical History:   Procedure Laterality Date    HX HYSTERECTOMY       Prior Level of Function/Home Situation: mod independent  Home Situation  Home Environment: Private residence  # Steps to Enter: 4  Rails to Enter: Yes  Hand Rails : Bilateral  One/Two Story Residence: Two story  # of Interior Steps: 14  Lift Chair Available: No  Living Alone: No  Support Systems: Spouse/Significant Other/Partner  Patient Expects to be Discharged to[de-identified] Private residence  Current DME Used/Available at Home: Walker, rolling  Tub or Shower Type: Tub/Shower combination  []     Right hand dominant   []     Left hand dominant  Cognitive/Behavioral Status:  Neurologic State: Alert  Orientation Level: Oriented X4  Cognition: Decreased attention/concentration; Follows commands; Impulsive;Poor safety awareness       Skin: no noted concern    Edema: no noted concern    Vision/Perceptual:    Tracking:  (no noted concern)                                Coordination:  Coordination: Generally decreased, functional (BUE)            Balance:   pt poor attention and distractibility limits overall balance in ADL requiring close supervision    Strength:    Strength: Generally decreased, functional (BUE)                Tone & Sensation:    Tone: Normal (BUE)  Sensation: Intact (BUE)                      Range of Motion:    AROM: Generally decreased, functional (BUE)                         Functional Mobility and Transfers for ADLs:  Bed Mobility:     Supine to Sit: Supervision        Transfers:  Sit to Stand: Supervision     Bed to Chair: Supervision          Toilet Transfer : Supervision                ADL Assessment:  Feeding: Setup    Oral Facial Hygiene/Grooming: Setup    Bathing: Stand-by assistance    Upper Body Dressing: Supervision    Lower Body Dressing: Supervision    Toileting: Supervision                  Pain:  Pt reports 0/10 pain or discomfort prior to treatment.    Pt reports 0/10 pain or discomfort post treatment. Activity Tolerance:   good    Please refer to the flowsheet for vital signs taken during this treatment. After treatment:   [x]  Patient left in no apparent distress sitting up in chair  []  Patient left in no apparent distress in bed  [x]  Call bell left within reach  []  Nursing notified  [x]  Caregiver present family present  []  Bed alarm activated    COMMUNICATION/EDUCATION:   Communication/Collaboration:  [x]      Home safety education was provided and the patient/caregiver indicated understanding. [x]      Patient/family have participated as able and agree with findings and recommendations. []      Patient is unable to participate in plan of care at this time.     Abraham García OT  Time Calculation: 38 mins

## 2018-04-18 NOTE — ROUTINE PROCESS
Bedside and Verbal shift change report given to Lisa (oncoming nurse) by Jose Luis Avilez RN (offgoing nurse). Report included the following information SBAR, Kardex, MAR and Recent Results.     SITUATION:    Code Status: Prior   Reason for Admission: Osteoarthritis of right knee, unspecified osteoarthritis type 200 Dannielle Olivo day: 1   Problem List:       Hospital Problems  Date Reviewed: 4/13/2018          Codes Class Noted POA    Arthritis of knee ICD-10-CM: M17.10  ICD-9-CM: 716.96  4/17/2018 Unknown              BACKGROUND:    Past Medical History:   Past Medical History:   Diagnosis Date    Hypertension          Patient taking anticoagulants no     ASSESSMENT:    Changes in Assessment Throughout Shift: no     Patient has Central Line: no Reasons if yes: no   Patient has Jones Cath: no Reasons if yes: no      Last Vitals:     Vitals:    04/17/18 1550 04/17/18 1949 04/18/18 0012 04/18/18 0404   BP: 129/67 107/67 108/63 120/72   Pulse: 76 76 62 64   Resp: 17 18 15 15   Temp: 97.8 °F (36.6 °C) 98.4 °F (36.9 °C) 98.6 °F (37 °C) 97.8 °F (36.6 °C)   SpO2: 100% 100% 100% 100%   Weight:       Height:            IV and DRAINS (will only show if present)   Peripheral IV 04/17/18 Left Wrist-Site Assessment: Clean, dry, & intact  [place] Drain 04/17/18 Right Knee-Site Assessment: Clean, dry, & intact     WOUND (if present)   Wound Type:  none   Dressing present Dressing Present : Intact, not due to be changed   Wound Concerns/Notes:  none     PAIN    Pain Assessment    Pain Intensity 1: 2 (04/17/18 1731)         Pain Intervention(s) 1: Rest    Patient Stated Pain Goal: 4  o Interventions for Pain:  none  o Intervention effective: no  o Time of last intervention: 0523  o Reassessment Completed: no      Last 3 Weights:  Last 3 Recorded Weights in this Encounter    04/12/18 1249 04/17/18 0648   Weight: 56.7 kg (125 lb) 55.3 kg (122 lb)     Weight change:      INTAKE/OUPUT    Current Shift: 04/17 1901 - 04/18 0700  In: -   Out: 1090 [Urine:975; Drains:115]    Last three shifts: 04/16 0701 - 04/17 1900  In: 6272 [P.O.:220; I.V.:1600]  Out: 150 [Urine:150]     LAB RESULTS   No results for input(s): WBC, HGB, HCT, PLT, HGBEXT, HCTEXT, PLTEXT in the last 72 hours. No results for input(s): NA, K, GLU, BUN, CREA, CA, MG, INR in the last 72 hours. No lab exists for component: PT, PTT, INREXT    RECOMMENDATIONS AND DISCHARGE PLANNING     1. Pending tests/procedures/ Plan of Care or Other Needs: PT/OT     2. Discharge plan for patient and Needs/Barriers: Home    3. Estimated Discharge Date: 4/18/18 Posted on Whiteboard in Patients Room: no      4. The patient's care plan was reviewed with the oncoming nurse. \"HEALS\" SAFETY CHECK      Fall Risk    Total Score: 3    Safety Measures: Safety Measures: Bed/Chair-Wheels locked, Bed in low position, Caregiver at bedside, Gripper socks, Side rails X 3    A safety check occurred in the patient's room between off going nurse and oncoming nurse listed above. The safety check included the below items  Area Items   H  High Alert Medications - Verify all high alert medication drips (heparin, PCA, etc.)   E  Equipment - Suction is set up for ALL patients (with hollis)  - Red plugs utilized for all equipment (IV pumps, etc.)  - WOWs wiped down at end of shift.  - Room stocked with oxygen, suction, and other unit-specific supplies   A  Alarms - Bed alarm is set for fall risk patients  - Ensure chair alarm is in place and activated if patient is up in a chair   L  Lines - Check IV for any infiltration  - Jones bag is empty if patient has a Jones   - Tubing and IV bags are labeled   S  Safety   - Room is clean, patient is clean, and equipment is clean. - Hallways are clear from equipment besides carts.    - Fall bracelet on for fall risk patients  - Ensure room is clear and free of clutter  - Suction is set up for ALL patients (with hollis)  - Hallways are clear from equipment besides carts.    - Isolation precautions followed, supplies available outside room, sign posted     Ness Martinez RN

## 2018-04-19 ENCOUNTER — HOME CARE VISIT (OUTPATIENT)
Dept: HOME HEALTH SERVICES | Facility: HOME HEALTH | Age: 73
End: 2018-04-19

## 2018-04-19 ENCOUNTER — HOME CARE VISIT (OUTPATIENT)
Dept: SCHEDULING | Facility: HOME HEALTH | Age: 73
End: 2018-04-19
Payer: MEDICARE

## 2018-04-19 VITALS — SYSTOLIC BLOOD PRESSURE: 120 MMHG | DIASTOLIC BLOOD PRESSURE: 70 MMHG | OXYGEN SATURATION: 96 % | HEART RATE: 74 BPM

## 2018-04-19 VITALS
RESPIRATION RATE: 16 BRPM | SYSTOLIC BLOOD PRESSURE: 137 MMHG | DIASTOLIC BLOOD PRESSURE: 79 MMHG | TEMPERATURE: 99.6 F | OXYGEN SATURATION: 94 % | HEART RATE: 80 BPM

## 2018-04-19 PROCEDURE — G0299 HHS/HOSPICE OF RN EA 15 MIN: HCPCS

## 2018-04-19 PROCEDURE — 3331090002 HH PPS REVENUE DEBIT

## 2018-04-19 PROCEDURE — 3331090001 HH PPS REVENUE CREDIT

## 2018-04-19 PROCEDURE — 400013 HH SOC

## 2018-04-19 PROCEDURE — A6255 ABSORPT DRG >16<=48 IN W/BDR: HCPCS

## 2018-04-19 PROCEDURE — G0151 HHCP-SERV OF PT,EA 15 MIN: HCPCS

## 2018-04-20 ENCOUNTER — HOME CARE VISIT (OUTPATIENT)
Dept: HOME HEALTH SERVICES | Facility: HOME HEALTH | Age: 73
End: 2018-04-20
Payer: MEDICARE

## 2018-04-20 ENCOUNTER — HOME CARE VISIT (OUTPATIENT)
Dept: SCHEDULING | Facility: HOME HEALTH | Age: 73
End: 2018-04-20
Payer: MEDICARE

## 2018-04-20 VITALS
DIASTOLIC BLOOD PRESSURE: 66 MMHG | SYSTOLIC BLOOD PRESSURE: 130 MMHG | OXYGEN SATURATION: 98 % | TEMPERATURE: 99.8 F | HEART RATE: 88 BPM

## 2018-04-20 PROCEDURE — 3331090002 HH PPS REVENUE DEBIT

## 2018-04-20 PROCEDURE — 3331090001 HH PPS REVENUE CREDIT

## 2018-04-20 PROCEDURE — G0157 HHC PT ASSISTANT EA 15: HCPCS

## 2018-04-21 ENCOUNTER — HOME CARE VISIT (OUTPATIENT)
Dept: SCHEDULING | Facility: HOME HEALTH | Age: 73
End: 2018-04-21
Payer: MEDICARE

## 2018-04-21 PROCEDURE — G0157 HHC PT ASSISTANT EA 15: HCPCS

## 2018-04-21 PROCEDURE — 3331090001 HH PPS REVENUE CREDIT

## 2018-04-21 PROCEDURE — 3331090002 HH PPS REVENUE DEBIT

## 2018-04-22 ENCOUNTER — HOME CARE VISIT (OUTPATIENT)
Dept: SCHEDULING | Facility: HOME HEALTH | Age: 73
End: 2018-04-22
Payer: MEDICARE

## 2018-04-22 PROCEDURE — G0157 HHC PT ASSISTANT EA 15: HCPCS

## 2018-04-22 PROCEDURE — 3331090001 HH PPS REVENUE CREDIT

## 2018-04-22 PROCEDURE — 3331090002 HH PPS REVENUE DEBIT

## 2018-04-23 ENCOUNTER — HOME CARE VISIT (OUTPATIENT)
Dept: SCHEDULING | Facility: HOME HEALTH | Age: 73
End: 2018-04-23
Payer: MEDICARE

## 2018-04-23 VITALS
OXYGEN SATURATION: 94 % | OXYGEN SATURATION: 99 % | SYSTOLIC BLOOD PRESSURE: 116 MMHG | HEART RATE: 97 BPM | DIASTOLIC BLOOD PRESSURE: 62 MMHG | SYSTOLIC BLOOD PRESSURE: 122 MMHG | TEMPERATURE: 99 F | HEART RATE: 91 BPM | DIASTOLIC BLOOD PRESSURE: 62 MMHG | TEMPERATURE: 99.3 F

## 2018-04-23 VITALS
TEMPERATURE: 98.4 F | SYSTOLIC BLOOD PRESSURE: 105 MMHG | OXYGEN SATURATION: 97 % | HEART RATE: 86 BPM | DIASTOLIC BLOOD PRESSURE: 69 MMHG

## 2018-04-23 PROCEDURE — 3331090002 HH PPS REVENUE DEBIT

## 2018-04-23 PROCEDURE — G0157 HHC PT ASSISTANT EA 15: HCPCS

## 2018-04-23 PROCEDURE — 3331090001 HH PPS REVENUE CREDIT

## 2018-04-24 ENCOUNTER — HOME CARE VISIT (OUTPATIENT)
Dept: SCHEDULING | Facility: HOME HEALTH | Age: 73
End: 2018-04-24
Payer: MEDICARE

## 2018-04-24 VITALS
DIASTOLIC BLOOD PRESSURE: 75 MMHG | TEMPERATURE: 98.9 F | OXYGEN SATURATION: 98 % | RESPIRATION RATE: 18 BRPM | HEART RATE: 84 BPM | SYSTOLIC BLOOD PRESSURE: 106 MMHG

## 2018-04-24 VITALS
SYSTOLIC BLOOD PRESSURE: 113 MMHG | OXYGEN SATURATION: 96 % | DIASTOLIC BLOOD PRESSURE: 78 MMHG | TEMPERATURE: 99.4 F | HEART RATE: 73 BPM

## 2018-04-24 PROCEDURE — G0299 HHS/HOSPICE OF RN EA 15 MIN: HCPCS

## 2018-04-24 PROCEDURE — 3331090002 HH PPS REVENUE DEBIT

## 2018-04-24 PROCEDURE — 3331090001 HH PPS REVENUE CREDIT

## 2018-04-24 PROCEDURE — G0157 HHC PT ASSISTANT EA 15: HCPCS

## 2018-04-25 ENCOUNTER — HOME CARE VISIT (OUTPATIENT)
Dept: SCHEDULING | Facility: HOME HEALTH | Age: 73
End: 2018-04-25
Payer: MEDICARE

## 2018-04-25 DIAGNOSIS — Z96.651 STATUS POST TOTAL RIGHT KNEE REPLACEMENT: Primary | ICD-10-CM

## 2018-04-25 PROCEDURE — 3331090001 HH PPS REVENUE CREDIT

## 2018-04-25 PROCEDURE — 3331090002 HH PPS REVENUE DEBIT

## 2018-04-25 PROCEDURE — G0157 HHC PT ASSISTANT EA 15: HCPCS

## 2018-04-25 NOTE — TELEPHONE ENCOUNTER
Date of Surgery: 04/17/2018 Right TKA  Last Visit: 04/11/2018 with SAUMYA Lira    Next Appointment: 05/02/2018 with SAUMYA Vega   Previous Refill Encounters: 04/18/2018 per SAUMYA Lira #[id number]    Requested Prescriptions     Pending Prescriptions Disp Refills    oxyCODONE-acetaminophen (PERCOCET) 7.5-325 mg per tablet 60 Tab 0     Sig: Take 1-2 Tabs by mouth every four (4) hours as needed for Pain. Max Daily Amount: 12 Tabs.

## 2018-04-25 NOTE — TELEPHONE ENCOUNTER
Post op 4/17/18 rt knee:    Spouse walked in at [facility] loc requesting status update.  Spouse states pt has 14 pills left and takes 2 every 4 hours

## 2018-04-26 ENCOUNTER — HOME CARE VISIT (OUTPATIENT)
Dept: SCHEDULING | Facility: HOME HEALTH | Age: 73
End: 2018-04-26
Payer: MEDICARE

## 2018-04-26 VITALS
OXYGEN SATURATION: 98 % | DIASTOLIC BLOOD PRESSURE: 58 MMHG | TEMPERATURE: 98.9 F | SYSTOLIC BLOOD PRESSURE: 111 MMHG | HEART RATE: 83 BPM

## 2018-04-26 PROCEDURE — 3331090001 HH PPS REVENUE CREDIT

## 2018-04-26 PROCEDURE — G0157 HHC PT ASSISTANT EA 15: HCPCS

## 2018-04-26 PROCEDURE — 3331090002 HH PPS REVENUE DEBIT

## 2018-04-26 RX ORDER — OXYCODONE AND ACETAMINOPHEN 7.5; 325 MG/1; MG/1
1-2 TABLET ORAL
Qty: 60 TAB | Refills: 0 | Status: SHIPPED | OUTPATIENT
Start: 2018-04-26 | End: 2018-05-17

## 2018-04-27 ENCOUNTER — HOME CARE VISIT (OUTPATIENT)
Dept: SCHEDULING | Facility: HOME HEALTH | Age: 73
End: 2018-04-27
Payer: MEDICARE

## 2018-04-27 VITALS
OXYGEN SATURATION: 99 % | HEART RATE: 65 BPM | TEMPERATURE: 98.8 F | TEMPERATURE: 99.5 F | SYSTOLIC BLOOD PRESSURE: 115 MMHG | DIASTOLIC BLOOD PRESSURE: 63 MMHG | HEART RATE: 84 BPM | SYSTOLIC BLOOD PRESSURE: 119 MMHG | OXYGEN SATURATION: 97 % | DIASTOLIC BLOOD PRESSURE: 63 MMHG

## 2018-04-27 PROCEDURE — 3331090002 HH PPS REVENUE DEBIT

## 2018-04-27 PROCEDURE — 3331090001 HH PPS REVENUE CREDIT

## 2018-04-27 PROCEDURE — G0157 HHC PT ASSISTANT EA 15: HCPCS

## 2018-04-28 ENCOUNTER — HOME CARE VISIT (OUTPATIENT)
Dept: SCHEDULING | Facility: HOME HEALTH | Age: 73
End: 2018-04-28
Payer: MEDICARE

## 2018-04-28 VITALS
DIASTOLIC BLOOD PRESSURE: 70 MMHG | HEART RATE: 91 BPM | OXYGEN SATURATION: 98 % | TEMPERATURE: 98.3 F | SYSTOLIC BLOOD PRESSURE: 140 MMHG

## 2018-04-28 PROCEDURE — G0151 HHCP-SERV OF PT,EA 15 MIN: HCPCS

## 2018-04-28 PROCEDURE — 3331090001 HH PPS REVENUE CREDIT

## 2018-04-28 PROCEDURE — 3331090002 HH PPS REVENUE DEBIT

## 2018-04-29 ENCOUNTER — HOME CARE VISIT (OUTPATIENT)
Dept: SCHEDULING | Facility: HOME HEALTH | Age: 73
End: 2018-04-29
Payer: MEDICARE

## 2018-04-29 VITALS — HEART RATE: 83 BPM | TEMPERATURE: 98.1 F

## 2018-04-29 PROCEDURE — 3331090001 HH PPS REVENUE CREDIT

## 2018-04-29 PROCEDURE — G0151 HHCP-SERV OF PT,EA 15 MIN: HCPCS

## 2018-04-29 PROCEDURE — 3331090002 HH PPS REVENUE DEBIT

## 2018-04-30 ENCOUNTER — HOME CARE VISIT (OUTPATIENT)
Dept: SCHEDULING | Facility: HOME HEALTH | Age: 73
End: 2018-04-30
Payer: MEDICARE

## 2018-04-30 PROCEDURE — G0157 HHC PT ASSISTANT EA 15: HCPCS

## 2018-04-30 PROCEDURE — 3331090001 HH PPS REVENUE CREDIT

## 2018-04-30 PROCEDURE — 3331090002 HH PPS REVENUE DEBIT

## 2018-05-01 ENCOUNTER — HOME CARE VISIT (OUTPATIENT)
Dept: SCHEDULING | Facility: HOME HEALTH | Age: 73
End: 2018-05-01
Payer: MEDICARE

## 2018-05-01 VITALS
SYSTOLIC BLOOD PRESSURE: 119 MMHG | HEART RATE: 85 BPM | OXYGEN SATURATION: 96 % | TEMPERATURE: 98.6 F | DIASTOLIC BLOOD PRESSURE: 64 MMHG

## 2018-05-01 VITALS
OXYGEN SATURATION: 98 % | DIASTOLIC BLOOD PRESSURE: 71 MMHG | SYSTOLIC BLOOD PRESSURE: 113 MMHG | RESPIRATION RATE: 16 BRPM | HEART RATE: 89 BPM | TEMPERATURE: 98.6 F

## 2018-05-01 PROCEDURE — G0157 HHC PT ASSISTANT EA 15: HCPCS

## 2018-05-01 PROCEDURE — G0299 HHS/HOSPICE OF RN EA 15 MIN: HCPCS

## 2018-05-01 PROCEDURE — 3331090001 HH PPS REVENUE CREDIT

## 2018-05-01 PROCEDURE — 3331090002 HH PPS REVENUE DEBIT

## 2018-05-02 ENCOUNTER — OFFICE VISIT (OUTPATIENT)
Dept: ORTHOPEDIC SURGERY | Facility: CLINIC | Age: 73
End: 2018-05-02

## 2018-05-02 ENCOUNTER — HOME CARE VISIT (OUTPATIENT)
Dept: HOME HEALTH SERVICES | Facility: HOME HEALTH | Age: 73
End: 2018-05-02
Payer: MEDICARE

## 2018-05-02 VITALS
WEIGHT: 120.8 LBS | TEMPERATURE: 98 F | SYSTOLIC BLOOD PRESSURE: 113 MMHG | HEART RATE: 97 BPM | HEIGHT: 60 IN | DIASTOLIC BLOOD PRESSURE: 63 MMHG | BODY MASS INDEX: 23.71 KG/M2 | OXYGEN SATURATION: 97 %

## 2018-05-02 VITALS
OXYGEN SATURATION: 98 % | DIASTOLIC BLOOD PRESSURE: 65 MMHG | TEMPERATURE: 97.8 F | HEART RATE: 80 BPM | SYSTOLIC BLOOD PRESSURE: 130 MMHG

## 2018-05-02 DIAGNOSIS — Z96.651 STATUS POST TOTAL RIGHT KNEE REPLACEMENT: Primary | ICD-10-CM

## 2018-05-02 DIAGNOSIS — G89.18 POST-OPERATIVE PAIN: ICD-10-CM

## 2018-05-02 PROCEDURE — 3331090002 HH PPS REVENUE DEBIT

## 2018-05-02 PROCEDURE — 3331090001 HH PPS REVENUE CREDIT

## 2018-05-02 RX ORDER — OXYCODONE AND ACETAMINOPHEN 10; 325 MG/1; MG/1
1 TABLET ORAL
Qty: 21 TAB | Refills: 0 | Status: SHIPPED | OUTPATIENT
Start: 2018-05-02 | End: 2018-05-17

## 2018-05-02 NOTE — PROGRESS NOTES
Patient: Gisselle Gill  YOB: 1945       HISTORY:  The patient presents for reevaluation of her s/p right total knee arthroplasty on 4/17/18. Patient is improved, states pain is a 8 out of 10.  she has participated in H/H physical therapy. has been doing knee exercises at home. She has had over the last 3 days some burning sensations from her foot to her thigh. It has only happened at night when she is trying to sleep. She has been pushing herself in PT. Patient denies any fever, chills, chest pain, shortness of breath or calf pain. There are no new illness or injuries to report since last seen in the office. PHYSICAL EXAMINATION:    Visit Vitals    /63 (BP 1 Location: Left arm)    Pulse 97    Temp 98 °F (36.7 °C)    Ht 5' (1.524 m)    Wt 120 lb 12.8 oz (54.8 kg)    SpO2 97%    BMI 23.59 kg/m2     The patient is a well-developed, well-nourished female in no acute distress. The patient is alert and oriented times three. The patient appears to be well groomed. Mood and affect are normal.   ORTHOPEDIC EXAM of Right knee: Inspection: Effusion present,  incisions clean, dry intact, staples in place  TTP: incisional  Range of motion: 0-95 flexion  Stability: Stable   Strength: 5/5  2+ distal pulses  NV intact, Neg straight leg raise, neg ttp to low back, SI joint, paraspinous muscles      IMPRESSION:  Status post Right total knee arthroplasty. PLAN:  Incisions cleaned. Staples removed and steri strips applied  Patient is weight bearing as tolerated. She may be overdoing it in PT. She is going to continue to ice, elevate her leg. Try heat on her thigh and shin. Will set up outpt physical therapy. Patient given refill of pain medication. Percocet 10mg Patient given pain medication for short term acute pain relief. Goal is to treat patient according to above plan and to ultimately have patient off all pain medications once appropriate.  If chronic pain management is required beyond what is expected for current orthopedic problem, will refer patient to pain management.  was reviewed and will be reviewed with every medication refill request.   Patient will follow up in 2 weeks.  ROM check     [facility]

## 2018-05-03 ENCOUNTER — HOME CARE VISIT (OUTPATIENT)
Dept: HOME HEALTH SERVICES | Facility: HOME HEALTH | Age: 73
End: 2018-05-03
Payer: MEDICARE

## 2018-05-03 PROCEDURE — 3331090002 HH PPS REVENUE DEBIT

## 2018-05-03 PROCEDURE — 3331090001 HH PPS REVENUE CREDIT

## 2018-05-04 ENCOUNTER — TELEPHONE (OUTPATIENT)
Dept: ORTHOPEDIC SURGERY | Age: 73
End: 2018-05-04

## 2018-05-04 ENCOUNTER — HOME CARE VISIT (OUTPATIENT)
Dept: SCHEDULING | Facility: HOME HEALTH | Age: 73
End: 2018-05-04
Payer: MEDICARE

## 2018-05-04 VITALS
TEMPERATURE: 97.5 F | OXYGEN SATURATION: 98 % | SYSTOLIC BLOOD PRESSURE: 140 MMHG | DIASTOLIC BLOOD PRESSURE: 70 MMHG | HEART RATE: 90 BPM

## 2018-05-04 PROCEDURE — 3331090001 HH PPS REVENUE CREDIT

## 2018-05-04 PROCEDURE — G0151 HHCP-SERV OF PT,EA 15 MIN: HCPCS

## 2018-05-04 PROCEDURE — 3331090002 HH PPS REVENUE DEBIT

## 2018-05-04 NOTE — TELEPHONE ENCOUNTER
Patient called at  1:30pm and she is asking for Mary to call her back since [employer] Liz saw patient 05/2/2018  Patient can be reached at [phone]

## 2018-05-04 NOTE — TELEPHONE ENCOUNTER
Patient is asking for Leann Bravo to call her ref her severe pain in her rt knee (po Total Knee Arthroplasty 04/17.  Please call patient back at [phone]

## 2018-05-04 NOTE — TELEPHONE ENCOUNTER
Pt called back at 11:37 said for JR to disregard previous message she is ok and does not need to bother him.

## 2018-05-04 NOTE — TELEPHONE ENCOUNTER
Spoke to patient. Patient stated she needed to talk to Trip Vargas about the rx given to her on 5/2/18. Requested more information so this message can be routed accurately. Patient insists she wants to speak with PA Elmer Galeazzi. Informed pt she is not in the office today. Patients stated she will call back on Monday.

## 2018-05-05 PROCEDURE — 3331090002 HH PPS REVENUE DEBIT

## 2018-05-05 PROCEDURE — 3331090001 HH PPS REVENUE CREDIT

## 2018-05-06 PROCEDURE — 3331090002 HH PPS REVENUE DEBIT

## 2018-05-06 PROCEDURE — 3331090001 HH PPS REVENUE CREDIT

## 2018-05-07 PROCEDURE — 3331090002 HH PPS REVENUE DEBIT

## 2018-05-07 PROCEDURE — 3331090001 HH PPS REVENUE CREDIT

## 2018-05-08 PROCEDURE — 3331090001 HH PPS REVENUE CREDIT

## 2018-05-08 PROCEDURE — 3331090002 HH PPS REVENUE DEBIT

## 2018-05-09 PROCEDURE — 3331090001 HH PPS REVENUE CREDIT

## 2018-05-09 PROCEDURE — 3331090002 HH PPS REVENUE DEBIT

## 2018-05-10 PROCEDURE — 3331090001 HH PPS REVENUE CREDIT

## 2018-05-10 PROCEDURE — 3331090002 HH PPS REVENUE DEBIT

## 2018-05-11 ENCOUNTER — HOSPITAL ENCOUNTER (OUTPATIENT)
Dept: PHYSICAL THERAPY | Age: 73
Discharge: HOME | End: 2018-05-11
Payer: MEDICARE

## 2018-05-11 PROCEDURE — 97161 PT EVAL LOW COMPLEX 20 MIN: CPT

## 2018-05-11 PROCEDURE — G8979 MOBILITY GOAL STATUS: HCPCS

## 2018-05-11 PROCEDURE — 3331090001 HH PPS REVENUE CREDIT

## 2018-05-11 PROCEDURE — 97110 THERAPEUTIC EXERCISES: CPT

## 2018-05-11 PROCEDURE — G8978 MOBILITY CURRENT STATUS: HCPCS

## 2018-05-11 PROCEDURE — 3331090002 HH PPS REVENUE DEBIT

## 2018-05-11 NOTE — PROGRESS NOTES
PT DAILY TREATMENT NOTE - [facility]     Patient Name: Marcelina Cardoza  Date:2018  : 1945  [x]  Patient  Verified  Payor: [place] MEDICARE / Plan: [place] MEDICARE PART A & B / Product Type: Medicare /    In time:235  Out time:315  Total Treatment Time (min): 40  Total Timed Codes (min): 8  1:1 Treatment Time ([facility] only): 40   Visit #: 1 of 12    Treatment Area: Pain in right knee [M25.561]  Presence of right artificial knee joint [Z96.651]    SUBJECTIVE  Pain Level (0-10 scale): 3  Any medication changes, allergies to medications, adverse drug reactions, diagnosis change, or new procedure performed?: [x] No    [] Yes (see summary sheet for update)  Subjective functional status/changes:   [] No changes reported      OBJECTIVE    Modality rationale:    Min Type Additional Details    [] Estim:  []Unatt       []IFC  []Premod                        []Other:  []w/ice   []w/heat  Position:  Location:    [] Estim: []Att    []TENS instruct  []NMES                    []Other:  []w/US   []w/ice   []w/heat  Position:  Location:    []  Traction: [] Cervical       []Lumbar                       [] Prone          []Supine                       []Intermittent   []Continuous Lbs:  [] before manual  [] after manual    []  Ultrasound: []Continuous   [] Pulsed                           []1MHz   []3MHz W/cm2:  Location:    []  Iontophoresis with dexamethasone         Location: [] Take home patch   [] In clinic    []  Ice     []  heat  []  Ice massage  []  Laser   []  Anodyne Position:  Location:    []  Laser with stim  []  Other:  Position:  Location:    []  Vasopneumatic Device Pressure:       [] lo [] med [] hi   Temperature: [] lo [] med [] hi   [] Skin assessment post-treatment:  []intact []redness- no adverse reaction    []redness - adverse reaction:     32 min [x]Eval                  []Re-Eval       8 min Therapeutic Exercise:  [] See flow sheet :HEP   Rationale: increase ROM and increase strength to improve the patients ability to normalize gait       With   [] TE   [] TA   [] neuro   [] other: Patient Education: [x] Review HEP    [] Progressed/Changed HEP based on:   [] positioning   [] body mechanics   [] transfers   [] heat/ice application    [] other:      Other Objective/Functional Measures:      Pain Level (0-10 scale) post treatment: 3    ASSESSMENT/Changes in Function:     Patient will continue to benefit from skilled PT services to modify and progress therapeutic interventions, address functional mobility deficits, address ROM deficits, address strength deficits, analyze and address soft tissue restrictions, analyze and cue movement patterns, analyze and modify body mechanics/ergonomics, assess and modify postural abnormalities, address imbalance/dizziness and instruct in home and community integration to attain remaining goals.      [x]  See Plan of Care  []  See progress note/recertification  []  See Discharge Summary         Progress towards goals / Updated goals:  See POC    PLAN  []  Upgrade activities as tolerated     [x]  Continue plan of care  []  Update interventions per flow sheet       []  Discharge due to:_  []  Other:_      Molly Fry PT 5/11/2018  1:55 PM    Future Appointments  Date Time Provider Trina Purcell   5/11/2018 2:30 PM Molly Fry PT [facility]   5/17/2018 3:00 PM KRISTINE Aguilar 69

## 2018-05-11 NOTE — PROGRESS NOTES
[facility] - [address]  [phone] fax  Plan of Care/ Statement of Necessity for Physical Therapy Services    Patient name: Jeronimo Tay Start of Care: 2018   Referral source: Nyla Savage MD : 1945    Medical Diagnosis: Pain in right knee [M25.561]  Presence of right artificial knee joint [Z96.651]   Onset Date:18    Treatment Diagnosis: Right knee pain (s/p TKA)   Prior Hospitalization: see medical history Provider#: [id number]   Medications: Verified on Patient summary List    Comorbidities: arthritis, scoliosis   Prior Level of Function: Functionally independent, retired teacher, lives with  in two level home      The [address] [place] [address] and following information is based on the information from the initial evaluation. Assessment/ key information: Patient is a pleasant 67year old female who presents following Right TKA on 18 due to history of bone on bone. Patient was discharged home to two to three weeks of HHPT, and has been ambulating with a SPC. Overall she is progressing well, but reports significant pain at night limiting sleep, as well as pain in the knee after increased activity levels. Negotiating stairs with step to pattern. At evaluation Patient demonstrates grossly full Right LE strength, with the following AROM: Right lacking 8 degrees extension to 95 flexion, Left 0-130. Good incisional healing with staples recently removed. Overall Patient is a good rehab candidate based on premorbid status, and will benefit from skilled physical therapy in order to improve Right LE function.      Evaluation Complexity History MEDIUM  Complexity : 1-2 comorbidities / personal factors will impact the outcome/ POC ; Examination LOW Complexity : 1-2 Standardized tests and measures addressing body structure, function, activity limitation and / or participation in recreation  ;Presentation LOW Complexity : Stable, uncomplicated  ;Clinical Decision Making MEDIUM Complexity : FOTO score of 26-74  Overall Complexity Rating: LOW   Problem List: pain affecting function, decrease ROM, decrease strength, edema affecting function, impaired gait/ balance, decrease ADL/ functional abilitiies, decrease activity tolerance, decrease flexibility/ joint mobility and decrease transfer abilities   Treatment Plan may include any combination of the following: Therapeutic exercise, Therapeutic activities, Neuromuscular re-education, Physical agent/modality, Gait/balance training, Manual therapy, Aquatic therapy, Patient education, Self Care training, Functional mobility training, Home safety training and Stair training  Patient / Family readiness to learn indicated by: asking questions, trying to perform skills and interest  Persons(s) to be included in education: patient (P)  Barriers to Learning/Limitations: None  Patient Goal (s): freedom of pain, range of motion  Patient Self Reported Health Status: good  Rehabilitation Potential: good    Short Term Goals: To be accomplished in 1 weeks:  Goal: Patient will be independent and compliant with HEP in order to progress toward long term goals. Status at last note/certification: Issued and reviewed  Long Term Goals: To be accomplished in 12 treatments:  Goal: Patient will improve FOTO assessment score to 69 in order to indicate improved functional abilities. Status at last note/certification: 54  Goal: Patient will improve Right knee AROM to lacking 5 degrees extension to 110 flexion in order to improve ease of transfers. Status at last note/certification: Lacking 8 degrees extension to 95 flexion  Goal: Patient will report ability to negotiate a full flight of stairs with reciprocal step pattern and no increase in Right knee pain in order to improve ease of mobility in the home.   Status at last note/certification: step to  Goal: Patient will report ability to safely ambulate community distances without AD in order to progress toward premorbid status. Status at last note/certification: using SPC  Goal: Patient will report worst Right knee pain as 5/10 or less in order to improve quality of sleep. Status at last note/certification: 91/14    Frequency / Duration: Patient to be seen 2 to 3 times per week for 12 treatments. Patient/ Caregiver education and instruction: Diagnosis, prognosis, exercises   [x]  Plan of care has been reviewed with PTA    G-Codes (GP)  Mobility   Current  CK= 40-59%   Goal  CJ= 20-39%  The severity rating is based on clinical judgment and the FOTO score. Certification Period: 5/11/18 to 6/9/18  Milton Keita, PT 5/11/2018 1:55 PM  _____________________________________________________________________  I certify that the above Therapy Services are being furnished while the patient is under my care. I agree with the treatment plan and certify that this therapy is necessary.     Physician's Signature:____________________  Date:__________Time:______    Please sign and return to [facility] - [address]  [phone] fax

## 2018-05-12 PROCEDURE — 3331090002 HH PPS REVENUE DEBIT

## 2018-05-12 PROCEDURE — 3331090001 HH PPS REVENUE CREDIT

## 2018-05-13 PROCEDURE — 3331090001 HH PPS REVENUE CREDIT

## 2018-05-13 PROCEDURE — 3331090002 HH PPS REVENUE DEBIT

## 2018-05-14 ENCOUNTER — TELEPHONE (OUTPATIENT)
Dept: ORTHOPEDIC SURGERY | Age: 73
End: 2018-05-14

## 2018-05-14 ENCOUNTER — HOSPITAL ENCOUNTER (OUTPATIENT)
Dept: PHYSICAL THERAPY | Age: 73
Discharge: HOME | End: 2018-05-14
Payer: MEDICARE

## 2018-05-14 PROCEDURE — 97110 THERAPEUTIC EXERCISES: CPT

## 2018-05-14 PROCEDURE — 97140 MANUAL THERAPY 1/> REGIONS: CPT

## 2018-05-14 NOTE — TELEPHONE ENCOUNTER
Patient called in states that she is in sever pain, she can not sleep, eat and not get comfortable. Pt aware they are not here today or tomorrow but would like a call back about what else she could try and do until her appt 05/17/18. She states that the Percocet is not helping her either.  Please contact patient at [phone]

## 2018-05-14 NOTE — PROGRESS NOTES
PT DAILY TREATMENT NOTE - [facility]     Patient Name: Blu Corona  Date:2018  : 1945  [x]  Patient  Verified  Payor: Josh Staff / Plan: [place] MEDICARE PART A & B / Product Type: Medicare /    In time:2:35  Out time:3;45  Total Treatment Time (min): 70  Total Timed Codes (min): 60  1:1 Treatment Time ( [address] only): 60   Visit #: 2 of 12    Treatment Area: Pain in right knee [M25.561]  Presence of right artificial knee joint [Z96.651]    SUBJECTIVE  Pain Level (0-10 scale): 0  Any medication changes, allergies to medications, adverse drug reactions, diagnosis change, or new procedure performed?: [x] No    [] Yes (see summary sheet for update)  Subjective functional status/changes:   [] No changes reported  I had a lot of pain and swelling last night. Pain in calf and swelling sore to ankle.     OBJECTIVE    Modality rationale: decrease pain and increase tissue extensibility to improve the patients ability to improve activity tolerance   Min Type Additional Details    [] Estim:  []Unatt       []IFC  []Premod                        []Other:  []w/ice   []w/heat  Position:  Location:    [] Estim: []Att    []TENS instruct  []NMES                    []Other:  []w/US   []w/ice   []w/heat  Position:  Location:    []  Traction: [] Cervical       []Lumbar                       [] Prone          []Supine                       []Intermittent   []Continuous Lbs:  [] before manual  [] after manual    []  Ultrasound: []Continuous   [] Pulsed                           []1MHz   []3MHz W/cm2:  Location:    []  Iontophoresis with dexamethasone         Location: [] Take home patch   [] In clinic   10 [x]  Ice     []  heat  []  Ice massage  []  Laser   []  Anodyne Position: supine  Location: right knee    []  Laser with stim  []  Other:  Position:  Location:    []  Vasopneumatic Device Pressure:       [] lo [] med [] hi   Temperature: [] lo [] med [] hi   [x] Skin assessment post-treatment:  [x]intact []redness- no adverse reaction    []redness - adverse reaction:       35 min Therapeutic Exercise:  [] See flow sheet :   Rationale: increase ROM and increase strength to improve the patients ability to improve gait, function      25 min Manual Therapy:  STM to gastroc, Quad and HS. TF mobs,. Manual stretching to increase flexion, extension   Rationale: decrease pain, increase ROM and decrease edema  to improve activity tolerance            With   [] TE   [] TA   [] neuro   [] other: Patient Education: [x] Review HEP    [] Progressed/Changed HEP based on:   [] positioning   [] body mechanics   [] transfers   [] heat/ice application    [] other:      Other Objective/Functional Measures: initiated ex program     Pain Level (0-10 scale) post treatment:  0    ASSESSMENT/Changes in Function: first follow-up after eval.    Patient will continue to benefit from skilled PT services to modify and progress therapeutic interventions, address functional mobility deficits, address ROM deficits, address strength deficits, analyze and address soft tissue restrictions, analyze and cue movement patterns, analyze and modify body mechanics/ergonomics, assess and modify postural abnormalities, address imbalance/dizziness and instruct in home and community integration to attain remaining goals. []  See Plan of Care  []  See progress note/recertification  []  See Discharge Summary         Progress towards goals / Updated goals:  Goal: Patient will be independent and compliant with HEP in order to progress toward long term goals. Status at last note/certification: Issued and reviewed  Long Term Goals: To be accomplished in 12 treatments:  Goal: Patient will improve FOTO assessment score to 69 in order to indicate improved functional abilities. Status at last note/certification: 54  Goal: Patient will improve Right knee AROM to lacking 5 degrees extension to 110 flexion in order to improve ease of transfers.   Status at last note/certification: Lacking 8 degrees extension to 95 flexion  Goal: Patient will report ability to negotiate a full flight of stairs with reciprocal step pattern and no increase in Right knee pain in order to improve ease of mobility in the home. Status at last note/certification: step to  Goal: Patient will report ability to safely ambulate community distances without AD in order to progress toward premorbid status. Status at last note/certification: using SPC  Goal: Patient will report worst Right knee pain as 5/10 or less in order to improve quality of sleep.   Status at last note/certification: 81/94    PLAN  [x]  Upgrade activities as tolerated     []  Continue plan of care  []  Update interventions per flow sheet       []  Discharge due to:_  []  Other:_      Peter Mclaughlin, QUINN 5/14/2018  2:52 PM    Future Appointments  Date Time Provider Trina Purcell   5/16/2018 9:30 AM Umesh Arriaga [facility]   5/17/2018 3:00 PM KRISTINE Melgar 69   5/18/2018 11:30 AM [facility]   5/21/2018 2:00 PM Umesh Arriaga [facility]   5/23/2018 2:00 PM [facility]   5/25/2018 2:00 PM [facility]   5/29/2018 11:30 AM Ivonne Bains [facility]   5/30/2018 2:00 PM [facility]   6/1/2018 2:00 PM Umesh Arriaga [facility]

## 2018-05-14 NOTE — TELEPHONE ENCOUNTER
If still taking pain meds, it may be associated to the narcotics.     Try to cut down and wean off the pain meds  Otherwise contact PCP for evaluation

## 2018-05-14 NOTE — TELEPHONE ENCOUNTER
[address] notifying patient to call our office back so we can inform her of SAUMYA Valladares's message.

## 2018-05-14 NOTE — TELEPHONE ENCOUNTER
Patient called attempting to reach Mulu Ferreira, she states she doesn't believe it is associated with the pain medication because she has not taken any medication except for Aleve and Tylenol in a week. She is still requesting a call back today. She states she will be available for the next 30 minutes, but she has a physical therapy appointment at 2:30 pm. she has to go to.  After her appointment she will be available at around 4 pm.

## 2018-05-15 NOTE — TELEPHONE ENCOUNTER
Spoke with patient, she states her original message was misconstrued, she wanted me to stress to you that she was not requesting any pain medication and that she had stopped taking the pain medicine over a week ago. She also states she does not feel she needs to go see her PCP she was just wondering if you had any suggestions to what she could do to ease her leg pain at night whether it be applying ice or a heating pad. Please advise.

## 2018-05-16 ENCOUNTER — HOSPITAL ENCOUNTER (OUTPATIENT)
Dept: PHYSICAL THERAPY | Age: 73
Discharge: HOME | End: 2018-05-16
Payer: MEDICARE

## 2018-05-16 PROCEDURE — 97110 THERAPEUTIC EXERCISES: CPT

## 2018-05-16 PROCEDURE — 97140 MANUAL THERAPY 1/> REGIONS: CPT

## 2018-05-16 NOTE — PROGRESS NOTES
PT DAILY TREATMENT NOTE - [facility]     Patient Name: Inge Edwards  Date:2018  : 1945  [x]  Patient  Verified  Payor: [place] MEDICARE / Plan: [place] MEDICARE PART A & B / Product Type: Medicare /    In time:927  Out time:1038  Total Treatment Time (min): 71  Total Timed Codes (min): 61  1:1 Treatment Time ( [address] only): 39   Visit #: 3 of 12    Treatment Area: Pain in right knee [M25.561]  Presence of right artificial knee joint [Z96.651]    SUBJECTIVE  Pain Level (0-10 scale): 3-4  Any medication changes, allergies to medications, adverse drug reactions, diagnosis change, or new procedure performed?: [x] No    [] Yes (see summary sheet for update)  Subjective functional status/changes:   [] No changes reported  It's not too bad right now.      OBJECTIVE    Modality rationale: decrease inflammation and decrease pain to improve the patients ability to improve activity tolerance   Min Type Additional Details    [] Estim:  []Unatt       []IFC  []Premod                        []Other:  []w/ice   []w/heat  Position:  Location:    [] Estim: []Att    []TENS instruct  []NMES                    []Other:  []w/US   []w/ice   []w/heat  Position:  Location:    []  Traction: [] Cervical       []Lumbar                       [] Prone          []Supine                       []Intermittent   []Continuous Lbs:  [] before manual  [] after manual    []  Ultrasound: []Continuous   [] Pulsed                           []1MHz   []3MHz W/cm2:  Location:    []  Iontophoresis with dexamethasone         Location: [] Take home patch   [] In clinic   10 [x]  Ice     []  heat  []  Ice massage  []  Laser   []  Anodyne Position:supine  Location:Right knee    []  Laser with stim  []  Other:  Position:  Location:    []  Vasopneumatic Device Pressure:       [] lo [] med [] hi   Temperature: [] lo [] med [] hi   [] Skin assessment post-treatment:  []intact []redness- no adverse reaction    []redness - adverse reaction:     46 min Therapeutic Exercise:  [x] See flow sheet :   Rationale: increase ROM and increase strength to improve the patients ability to improve ease of mobility, stairs    15 min Manual Therapy:  Right knee manual stretching, tibiofemoral joint/patellar mobs grade II-III for flexion/extension, STM distal quads/hamstrings    Rationale: decrease pain, increase ROM, increase tissue extensibility and decrease trigger points to normalize gait       With   [] TE   [] TA   [] neuro   [] other: Patient Education: [x] Review HEP    [] Progressed/Changed HEP based on:   [] positioning   [] body mechanics   [] transfers   [] heat/ice application    [] other:      Other Objective/Functional Measures: completed exercises per flow sheet     Pain Level (0-10 scale) post treatment: 3    ASSESSMENT/Changes in Function: Improved extension ROM noted today, some increase in pain with maximum flexion. Good knee stability with eccentric step downs. Patient will continue to benefit from skilled PT services to modify and progress therapeutic interventions, address functional mobility deficits, address ROM deficits, address strength deficits, analyze and address soft tissue restrictions, analyze and cue movement patterns, analyze and modify body mechanics/ergonomics, assess and modify postural abnormalities, address imbalance/dizziness and instruct in home and community integration to attain remaining goals. []  See Plan of Care  []  See progress note/recertification  []  See Discharge Summary         Progress towards goals / Updated goals:  Goal: Patient will be independent and compliant with HEP in order to progress toward long term goals. Status at last note/certification: Issued and reviewed  Current status: Met  Long Term Goals: To be accomplished in 12 treatments:  Goal: Patient will improve FOTO assessment score to 69 in order to indicate improved functional abilities.   Status at last note/certification: 54  Current status:   Goal: Patient will improve Right knee AROM to lacking 5 degrees extension to 110 flexion in order to improve ease of transfers. Status at last note/certification: Lacking 8 degrees extension to 95 flexion  Current status:   Goal: Patient will report ability to negotiate a full flight of stairs with reciprocal step pattern and no increase in Right knee pain in order to improve ease of mobility in the home. Status at last note/certification: step to  Current status:   Goal: Patient will report ability to safely ambulate community distances without AD in order to progress toward premorbid status. Status at last note/certification: using SPC  Current status:   Goal: Patient will report worst Right knee pain as 5/10 or less in order to improve quality of sleep.   Status at last note/certification: 43/67  Current status:     PLAN  [x]  Upgrade activities as tolerated     [x]  Continue plan of care  []  Update interventions per flow sheet       []  Discharge due to:_  []  Other:_      Berta Anne, JACOBY 5/16/2018  7:08 AM    Future Appointments  Date Time Provider Trina Purcell   5/16/2018 9:30 AM Berta Anne, JACOBY [facility]   5/17/2018 3:00 PM KRISTINE Jolley [address]   5/18/2018 11:30 AM [facility]   5/21/2018 2:00 PM Berta Anne, PT [facility]   5/23/2018 2:00 PM [facility]   5/25/2018 2:00 PM [facility]   5/29/2018 11:30 AM Ivonne Betancourt [facility]   5/30/2018 2:00 PM [facility]   6/1/2018 2:00 PM Berta Anne, JACOBY [facility]

## 2018-05-17 ENCOUNTER — OFFICE VISIT (OUTPATIENT)
Dept: ORTHOPEDIC SURGERY | Age: 73
End: 2018-05-17

## 2018-05-17 VITALS
HEART RATE: 78 BPM | BODY MASS INDEX: 22.7 KG/M2 | TEMPERATURE: 97.2 F | DIASTOLIC BLOOD PRESSURE: 67 MMHG | WEIGHT: 115.6 LBS | OXYGEN SATURATION: 100 % | SYSTOLIC BLOOD PRESSURE: 117 MMHG | HEIGHT: 60 IN | RESPIRATION RATE: 16 BRPM

## 2018-05-17 DIAGNOSIS — Z96.651 STATUS POST TOTAL RIGHT KNEE REPLACEMENT: ICD-10-CM

## 2018-05-17 DIAGNOSIS — M79.661 RIGHT CALF PAIN: Primary | ICD-10-CM

## 2018-05-17 NOTE — PROGRESS NOTES
[address], [address] [place] [address]  [phone]           Patient: True Smiling                MRN: [id number]       SSN: [id number]  YOB: 1945        AGE: 67 y.o. SEX: female  Body mass index is 22.58 kg/(m^2). PCP: Sue Christie MD  05/17/18      This office note has been dictated. REVIEW OF SYSTEMS:  Constitutional: Negative for fever, chills, weight loss and malaise/fatigue. HENT: Negative. Eyes: Negative. Respiratory: Negative. Cardiovascular: Negative. Gastrointestinal: No bowel incontinence or constipation. Genitourinary: No bladder incontinence or saddle anesthesia. Skin: Negative. Neurological: Negative. Endo/Heme/Allergies: Negative. Psychiatric/Behavioral: Negative. Musculoskeletal: As per HPI above. Past Medical History:   Diagnosis Date    Hypertension          Current Outpatient Prescriptions:     naproxen sodium (ALEVE) 220 mg cap, Take 2 Caplet by mouth nightly as needed (pain). , Disp: , Rfl:     cholecalciferol (VITAMIN D3) 1,000 unit tablet, Take 5,000 Units by mouth daily. , Disp: , Rfl:     celecoxib (CELEBREX) 200 mg capsule, Take 1 Cap by mouth every twelve (12) hours every twelve (12) hours for 90 days. , Disp: 60 Cap, Rfl: 2    ferrous sulfate 325 mg (65 mg iron) tablet, Take 1 Tab by mouth two (2) times daily (with meals). , Disp: 60 Tab, Rfl: 2    aspirin 81 mg chewable tablet, Take 3 Tabs by mouth two (2) times a day., Disp: 60 Tab, Rfl: 0    multivitamin (ONE A DAY) tablet, Take 1 Tab by mouth daily. , Disp: , Rfl:     MAGNESIUM CARBONATE PO, Take 400 mg by mouth daily. , Disp: , Rfl:     No Known Allergies    Social History     Social History    Marital status:      Spouse name: N/A    Number of children: N/A    Years of education: N/A     Occupational History    Not on file.      Social History Main Topics    Smoking status: Never Smoker    Smokeless tobacco: Never Used    Alcohol use 0.5 oz/week     1 Glasses of wine per week      Comment: occasionally    Drug use: No    Sexual activity: Yes     Partners: Male     Other Topics Concern    Not on file     Social History Narrative       Past Surgical History:   Procedure Laterality Date    HX HYSTERECTOMY      HX KNEE REPLACEMENT Right 04/17/2018             We did see Ms. Omero Day for followup with regards to her right knee replacement. The patient is now four weeks status post surgery and progressing quite nicely. She is having a little bit of radicular symptomology to the right lower extremity and a little calf discomfort at night. She denies any swelling, no redness, no chest pain, and no shortness of breath. She does continue on her aspirin. She is working in physical therapy, as well as on her own without complications. She is quite happy with the results of the knee replacement so far. PHYSICAL EXAMINATION:  In general, the patient is alert and oriented x 3 in no acute distress. The patient is well-developed, well-nourished, with a normal affect. The patient is afebrile. HEENT:  Head is normocephalic and atraumatic. Pupils are equally round and reactive to light and accommodation. Extraocular eye movements are intact. Neck is supple. Trachea is midline. No JVD is present. Breathing is nonlabored. Examination of the right knee reveals the skin is intact. The surgical wound is healing nicely. There is no erythema, no ecchymosis, no warmth, and no signs of infection or cellulitis present. There is minimal swelling with negative joint effusion. Range of motion is full extension to 115° of flexion. The patella tracks nicely. Stability is excellent. There is negative calf tenderness. Negative Elisas. There are no signs for DVT present clinically. ASSESSMENT:  Status post right knee replacement. PLAN:  At this point, the patient is doing quite well.   She will continue physical therapy, as well as independent range of motion activities on her own on an hourly basis for both flexion and extension, which were demonstrated for the patient today in the office. We will get her set up for a Doppler ultrasound of the lower extremity to rule out DVT. I do think that it is mostly radicular in nature. Nonetheless, we will ensure the Doppler is negative. We will plan on seeing her back in two weeks time for evaluation, range of motion check, and x-rays. She will call with any questions or concerns that shall arise.                JR Blaine MARQUEZ, PA-C, ATC

## 2018-05-18 ENCOUNTER — HOSPITAL ENCOUNTER (OUTPATIENT)
Dept: PHYSICAL THERAPY | Age: 73
Discharge: HOME | End: 2018-05-18
Payer: MEDICARE

## 2018-05-18 PROCEDURE — 97110 THERAPEUTIC EXERCISES: CPT

## 2018-05-18 PROCEDURE — 97140 MANUAL THERAPY 1/> REGIONS: CPT

## 2018-05-18 NOTE — PROGRESS NOTES
PT DAILY TREATMENT NOTE - [facility]     Patient Name: Marlene Kwon  Date:2018  : 1945  [x]  Patient  Verified  Payor: [place] MEDICARE / Plan: [place] MEDICARE PART A & B / Product Type: Medicare /    In time:11:30  Out time: 12;40  Total Treatment Time (min): 70  Total Timed Codes (min): 60  1:1 Treatment Time ([facility] only): 30   Visit #: 4 of 12    Treatment Area: Pain in right knee [M25.561]  Presence of right artificial knee joint [Z96.651]    SUBJECTIVE  Pain Level (0-10 scale): 0  Any medication changes, allergies to medications, adverse drug reactions, diagnosis change, or new procedure performed?: [x] No    [] Yes (see summary sheet for update)  Subjective functional status/changes:   [] No changes reported  Dr. Olinda Peña was pleased with my ROM    OBJECTIVE    Modality rationale: decrease pain to improve the patients ability to improve activity tolerance   Min Type Additional Details    [] Estim:  []Unatt       []IFC  []Premod                        []Other:  []w/ice   []w/heat  Position:  Location:    [] Estim: []Att    []TENS instruct  []NMES                    []Other:  []w/US   []w/ice   []w/heat  Position:  Location:    []  Traction: [] Cervical       []Lumbar                       [] Prone          []Supine                       []Intermittent   []Continuous Lbs:  [] before manual  [] after manual    []  Ultrasound: []Continuous   [] Pulsed                           []1MHz   []3MHz W/cm2:  Location:    []  Iontophoresis with dexamethasone         Location: [] Take home patch   [] In clinic   10 [x]  Ice     []  heat  []  Ice massage  []  Laser   []  Anodyne Position: reclilned  Location: right knee    []  Laser with stim  []  Other:  Position:  Location:    []  Vasopneumatic Device Pressure:       [] lo [] med [] hi   Temperature: [] lo [] med [] hi   [x] Skin assessment post-treatment:  [x]intact []redness- no adverse reaction    []redness - adverse reaction:       45 min Therapeutic Exercise:  [] See flow sheet :   Rationale: increase ROM and increase strength to improve the patients ability to improve ease of function, activity tolerance    15 min Manual Therapy:  Manual stretching, TF mobs   Rationale: increase ROM and increase tissue extensibility to improve ease of gait       With   [] TE   [] TA   [] neuro   [] other: Patient Education: [x] Review HEP    [] Progressed/Changed HEP based on:   [] positioning   [] body mechanics   [] transfers   [] heat/ice application    [] other:      Other Objective/Functional Measures:   added HS curls withGTB     Pain Level (0-10 scale) post treatment: 0    ASSESSMENT/Changes in Function:  progressing towards goals. Extension continues to improve. Patient will continue to benefit from skilled PT services to modify and progress therapeutic interventions, address functional mobility deficits, address ROM deficits, address strength deficits, analyze and address soft tissue restrictions, analyze and cue movement patterns, analyze and modify body mechanics/ergonomics, assess and modify postural abnormalities, address imbalance/dizziness and instruct in home and community integration to attain remaining goals. []  See Plan of Care  []  See progress note/recertification  []  See Discharge Summary         Progress towards goals / Updated goals:  Goal: Patient will be independent and compliant with HEP in order to progress toward long term goals. Status at last note/certification: Issued and reviewed  Current status: Met  Long Term Goals: To be accomplished in 12 treatments:  Goal: Patient will improve FOTO assessment score to 69 in order to indicate improved functional abilities. Status at last note/certification: 54  Current status:   Goal: Patient will improve Right knee AROM to lacking 5 degrees extension to 110 flexion in order to improve ease of transfers.   Status at last note/certification: Lacking 8 degrees extension to 95 flexion  Current status:   Goal: Patient will report ability to negotiate a full flight of stairs with reciprocal step pattern and no increase in Right knee pain in order to improve ease of mobility in the home. Status at last note/certification: step to  Current status:   Goal: Patient will report ability to safely ambulate community distances without AD in order to progress toward premorbid status. Status at last note/certification: using SPC  Current status:   Goal: Patient will report worst Right knee pain as 5/10 or less in order to improve quality of sleep.   Status at last note/certification: 53/50  Current status:     PLAN  [x]  Upgrade activities as tolerated     []  Continue plan of care  []  Update interventions per flow sheet       []  Discharge due to:_  []  Other:_      An Cotton, QUINN 5/18/2018  12:37 PM    Future Appointments  Date Time Provider Trina Purcell   5/21/2018 2:00 PM Alfredo Cortes PT [facility]   5/23/2018 2:00 PM Lorna Huerta [facility]   5/25/2018 2:00  [address] [facility]   5/29/2018 11:30 AM [facility]   5/30/2018 2:00 PM Yazmin Cervantes PT [facility]   6/1/2018 1:50 PM KRISTINE Cowan   6/1/2018 2:00 PM Alfredo Cortes PT [facility]

## 2018-05-21 ENCOUNTER — HOSPITAL ENCOUNTER (OUTPATIENT)
Dept: PHYSICAL THERAPY | Age: 73
Discharge: HOME | End: 2018-05-21
Payer: MEDICARE

## 2018-05-21 PROCEDURE — 97110 THERAPEUTIC EXERCISES: CPT

## 2018-05-21 PROCEDURE — 97140 MANUAL THERAPY 1/> REGIONS: CPT

## 2018-05-21 NOTE — PROGRESS NOTES
PT DAILY TREATMENT NOTE - [facility]     Patient Name: Waqas So  Date:2018  : 1945  [x]  Patient  Verified  Payor: Heber Ackerman / Plan: [place] MEDICARE PART A & B / Product Type: Medicare /    In time:158  Out time:308  Total Treatment Time (min): 70  Total Timed Codes (min): 60  1:1 Treatment Time ( [address] only): 37   Visit #: 5 of 12    Treatment Area: Pain in right knee [M25.561]  Presence of right artificial knee joint [Z96.651]    SUBJECTIVE  Pain Level (0-10 scale): 0  Any medication changes, allergies to medications, adverse drug reactions, diagnosis change, or new procedure performed?: [x] No    [] Yes (see summary sheet for update)  Subjective functional status/changes:   [] No changes reported  I saw the doctor sometime last week and he said I could put heat on my calf.      OBJECTIVE    Modality rationale: decrease inflammation and decrease pain to improve the patients ability to improve activity tolerance   Min Type Additional Details    [] Estim:  []Unatt       []IFC  []Premod                        []Other:  []w/ice   []w/heat  Position:  Location:    [] Estim: []Att    []TENS instruct  []NMES                    []Other:  []w/US   []w/ice   []w/heat  Position:  Location:    []  Traction: [] Cervical       []Lumbar                       [] Prone          []Supine                       []Intermittent   []Continuous Lbs:  [] before manual  [] after manual    []  Ultrasound: []Continuous   [] Pulsed                           []1MHz   []3MHz W/cm2:  Location:    []  Iontophoresis with dexamethasone         Location: [] Take home patch   [] In clinic   10 [x]  Ice     []  heat  []  Ice massage  []  Laser   []  Anodyne Position:supine with wedge  Location:Right knee    []  Laser with stim  []  Other:  Position:  Location:    []  Vasopneumatic Device Pressure:       [] lo [] med [] hi   Temperature: [] lo [] med [] hi   [] Skin assessment post-treatment:  []intact []redness- no adverse reaction []redness - adverse reaction:     50 min Therapeutic Exercise:  [x] See flow sheet :   Rationale: increase ROM and increase strength to improve the patients ability to improve ease of mobility     10 min Manual Therapy:  Right knee manual stretching, tibiofemoral joint/patellar mobs for flexion/extension, STM distal quads   Rationale: decrease pain, increase ROM, increase tissue extensibility and decrease trigger points to normalize gait          With   [] TE   [] TA   [] neuro   [] other: Patient Education: [x] Review HEP    [] Progressed/Changed HEP based on:   [] positioning   [] body mechanics   [] transfers   [] heat/ice application    [] other:      Other Objective/Functional Measures: added ankle weights for strengthening     Pain Level (0-10 scale) post treatment: 0    ASSESSMENT/Changes in Function: Patient able to perform strengthening activities with weights without pain. Reports that she will be having an ultrasound to her Right calf later this week to rule out blood clots. Tender at proximal end of incision. Patient will continue to benefit from skilled PT services to modify and progress therapeutic interventions, address functional mobility deficits, address ROM deficits, address strength deficits, analyze and address soft tissue restrictions, analyze and cue movement patterns, analyze and modify body mechanics/ergonomics, assess and modify postural abnormalities, address imbalance/dizziness and instruct in home and community integration to attain remaining goals. []  See Plan of Care  []  See progress note/recertification  []  See Discharge Summary         Progress towards goals / Updated goals:  Goal: Patient will be independent and compliant with HEP in order to progress toward long term goals.   Status at last note/certification: Issued and reviewed  Current status: Met  Long Term Goals: To be accomplished in 12 treatments:  Goal: Patient will improve FOTO assessment score to 69 in order to indicate improved functional abilities. Status at last note/certification: 54  Current status: Progressing, 64  Goal: Patient will improve Right knee AROM to lacking 5 degrees extension to 110 flexion in order to improve ease of transfers. Status at last note/certification: Lacking 8 degrees extension to 95 flexion  Current status:   Goal: Patient will report ability to negotiate a full flight of stairs with reciprocal step pattern and no increase in Right knee pain in order to improve ease of mobility in the home. Status at last note/certification: step to  Current status:   Goal: Patient will report ability to safely ambulate community distances without AD in order to progress toward premorbid status. Status at last note/certification: using SPC  Current status:   Goal: Patient will report worst Right knee pain as 5/10 or less in order to improve quality of sleep.   Status at last note/certification: 01/64  Current status:     PLAN  [x]  Upgrade activities as tolerated     [x]  Continue plan of care  []  Update interventions per flow sheet       []  Discharge due to:_  []  Other:_      Estephania Foote, PT 5/21/2018  1:06 PM    Future Appointments  Date Time Provider Trina Purcell   5/21/2018 2:00 PM Estephania Foote, PT [facility]   5/23/2018 2:00  [address] [facility]   5/25/2018 9:00 AM [facility]   5/25/2018 2:00 PM Estephania Foote, PT [facility]   5/29/2018 11:30 AM Raudel Antunez [facility]   5/30/2018 2:00 PM Aleyda Cervantes, PT [facility]   6/1/2018 1:50 PM KRISTINE Najera [address]   6/1/2018 2:00 PM [facility] Foote, [facility]

## 2018-05-23 ENCOUNTER — HOSPITAL ENCOUNTER (OUTPATIENT)
Dept: PHYSICAL THERAPY | Age: 73
Discharge: HOME | End: 2018-05-23
Payer: MEDICARE

## 2018-05-23 PROCEDURE — 97110 THERAPEUTIC EXERCISES: CPT

## 2018-05-23 PROCEDURE — 97140 MANUAL THERAPY 1/> REGIONS: CPT

## 2018-05-23 NOTE — PROGRESS NOTES
PT DAILY TREATMENT NOTE - [facility]     Patient Name: Blu Corona  Date:2018  : 1945  [x]  Patient  Verified  Payor: [place] MEDICARE / Plan: [place] MEDICARE PART A & B / Product Type: Medicare /    In time:2:00  Out time:3:00  Total Treatment Time (min): 60  Total Timed Codes (min): 50  1:1 Treatment Time ( [address] only): 50   Visit #: 6 of 12    Treatment Area: Pain in right knee [M25.561]  Presence of right artificial knee joint [Z96.651]    SUBJECTIVE  Pain Level (0-10 scale): 0  Any medication changes, allergies to medications, adverse drug reactions, diagnosis change, or new procedure performed?: [x] No    [] Yes (see summary sheet for update)  Subjective functional status/changes:   [] No changes reported  I can just run up the stairs now. Not really, but with ease.     OBJECTIVE    Modality rationale: decrease pain to improve the patients ability to tolerate activity   Min Type Additional Details    [] Estim:  []Unatt       []IFC  []Premod                        []Other:  []w/ice   []w/heat  Position:  Location:    [] Estim: []Att    []TENS instruct  []NMES                    []Other:  []w/US   []w/ice   []w/heat  Position:  Location:    []  Traction: [] Cervical       []Lumbar                       [] Prone          []Supine                       []Intermittent   []Continuous Lbs:  [] before manual  [] after manual    []  Ultrasound: []Continuous   [] Pulsed                           []1MHz   []3MHz W/cm2:  Location:    []  Iontophoresis with dexamethasone         Location: [] Take home patch   [] In clinic   10 [x]  Ice     []  heat  []  Ice massage  []  Laser   []  Anodyne Position:  supine  Location: right knee    []  Laser with stim  []  Other:  Position:  Location:    []  Vasopneumatic Device Pressure:       [] lo [] med [] hi   Temperature: [] lo [] med [] hi   [x] Skin assessment post-treatment:  [x]intact []redness- no adverse reaction    []redness - adverse reaction:       35 min Therapeutic Exercise:  [] See flow sheet :   Rationale: increase strength to improve the patients ability to more easliy perform daily tasks, sleep    15 min Manual Therapy:  STM to quad, gastroc. Manual stretching   Rationale: increase ROM and increase tissue extensibility to normalize gait        With   [] TE   [] TA   [] neuro   [] other: Patient Education: [x] Review HEP    [] Progressed/Changed HEP based on:   [] positioning   [] body mechanics   [] transfers   [] heat/ice application    [] other:      Other Objective/Functional Measures:      Pain Level (0-10 scale) post treatment: 0    ASSESSMENT/Changes in Function: improving function and stair negotiation. Continues to report pain at night effecting sleep     Patient will continue to benefit from skilled PT services to modify and progress therapeutic interventions, address functional mobility deficits, address ROM deficits, address strength deficits, analyze and address soft tissue restrictions, analyze and cue movement patterns, analyze and modify body mechanics/ergonomics, assess and modify postural abnormalities, address imbalance/dizziness and instruct in home and community integration to attain remaining goals. []  See Plan of Care  []  See progress note/recertification  []  See Discharge Summary         Progress towards goals / Updated goals:  Goal: Patient will be independent and compliant with HEP in order to progress toward long term goals. Status at last note/certification: Issued and reviewed  Current status: Met  Long Term Goals: To be accomplished in 12 treatments:  Goal: Patient will improve FOTO assessment score to 69 in order to indicate improved functional abilities. Status at last note/certification: 54  Current status: Progressing, 64  Goal: Patient will improve Right knee AROM to lacking 5 degrees extension to 110 flexion in order to improve ease of transfers.   Status at last note/certification: Lacking 8 degrees extension to 95 flexion  Current status:   Goal: Patient will report ability to negotiate a full flight of stairs with reciprocal step pattern and no increase in Right knee pain in order to improve ease of mobility in the home. Status at last note/certification: step to  Current status: met: reciprocal on ascent, slower on descent but able to perform reciprocally  Goal: Patient will report ability to safely ambulate community distances without AD in order to progress toward premorbid status. Status at last note/certification: using SPC  Current status:  Met  No longer using SPC or AD for mobility  Goal: Patient will report worst Right knee pain as 5/10 or less in order to improve quality of sleep.   Status at last note/certification: 52/38  Current status: progressing 6/10 in calf     PLAN  [x]  Upgrade activities as tolerated     []  Continue plan of care  []  Update interventions per flow sheet       []  Discharge due to:_  []  Other:_      Dipesh Barber, PTA 5/23/2018  2:08 PM    Future Appointments  Date Time Provider Trina Purcell   5/25/2018 9:00 AM [facility]   5/25/2018 2:00 PM Molly Fry, PT [facility]   5/29/2018 11:30 AM Han Ward [facility]   5/30/2018 2:00 PM Zahra Cervantes, PT [facility]   6/1/2018 1:50 PM KRISTINE Aguilar 69   6/1/2018 2:00 PM Molly Fry, PT [facility]

## 2018-05-25 ENCOUNTER — HOSPITAL ENCOUNTER (OUTPATIENT)
Dept: PHYSICAL THERAPY | Age: 73
Discharge: HOME | End: 2018-05-25
Payer: MEDICARE

## 2018-05-25 ENCOUNTER — HOSPITAL ENCOUNTER (OUTPATIENT)
Dept: VASCULAR SURGERY | Age: 73
Discharge: HOME | End: 2018-05-25
Attending: PHYSICIAN ASSISTANT
Payer: MEDICARE

## 2018-05-25 DIAGNOSIS — M79.661 RIGHT CALF PAIN: ICD-10-CM

## 2018-05-25 PROCEDURE — 97110 THERAPEUTIC EXERCISES: CPT

## 2018-05-25 PROCEDURE — 97140 MANUAL THERAPY 1/> REGIONS: CPT

## 2018-05-25 PROCEDURE — 93971 EXTREMITY STUDY: CPT

## 2018-05-25 NOTE — PROCEDURES
[facility]  *** FINAL REPORT ***    Name: Silvana Cranker  MRN: [id number]    Outpatient  : 1945  HIS Order #: [id number]  [address] [facility] Visit #: [id number]  Date: 25 May 2018    TYPE OF TEST: Peripheral Venous Testing    REASON FOR TEST  Limb swelling    Right Leg:-  Deep venous thrombosis:           No  Superficial venous thrombosis:    No  Deep venous insufficiency:        Not examined  Superficial venous insufficiency: Not examined      INTERPRETATION/FINDINGS  Duplex images were obtained using 2-D gray scale, color flow, and  spectral Doppler analysis. Right leg :  1. Deep vein(s) visualized include the common femoral, proximal  femoral, mid femoral, distal femoral, popliteal(above knee),  popliteal(fossa), popliteal(below knee), posterior tibial and peroneal   veins. 2. No evidence of deep venous thrombosis detected in the veins  visualized. 3. No evidence of deep vein thrombosis in the contralateral common  femoral vein. 4. Superficial vein(s) visualized include the great saphenous vein. 5. No evidence of superficial thrombosis detected. ADDITIONAL COMMENTS    I have personally reviewed the data relevant to the interpretation of  this  study.     TECHNOLOGIST: Marge Lenz, [facility], RVT/  Signed: 2018 09:17 AM    PHYSICIAN: Carlos Barajas MD  Signed: 2018 04:37 PM

## 2018-05-25 NOTE — PROGRESS NOTES
PT DAILY TREATMENT NOTE - [facility]     Patient Name: Christopher Leonardo  Date:2018  : 1945  [x]  Patient  Verified  Payor: [place] MEDICARE / Plan: VA MEDICARE PART A & B / Product Type: Medicare /    In time:155  Out time:300  Total Treatment Time (min): 65  Total Timed Codes (min): 55  1:1 Treatment Time ( [address] only): 35   Visit #: 7 of 12    Treatment Area: Pain in right knee [M25.561]  Presence of right artificial knee joint [Z96.651]    SUBJECTIVE  Pain Level (0-10 scale): 0  Any medication changes, allergies to medications, adverse drug reactions, diagnosis change, or new procedure performed?: [x] No    [] Yes (see summary sheet for update)  Subjective functional status/changes:   [] No changes reported  I feel a little nauseated, I don't know why. But I'm good.      OBJECTIVE    Modality rationale: decrease inflammation and decrease pain to improve the patients ability to improve activity tolerance   Min Type Additional Details    [] Estim:  []Unatt       []IFC  []Premod                        []Other:  []w/ice   []w/heat  Position:  Location:    [] Estim: []Att    []TENS instruct  []NMES                    []Other:  []w/US   []w/ice   []w/heat  Position:  Location:    []  Traction: [] Cervical       []Lumbar                       [] Prone          []Supine                       []Intermittent   []Continuous Lbs:  [] before manual  [] after manual    []  Ultrasound: []Continuous   [] Pulsed                           []1MHz   []3MHz W/cm2:  Location:    []  Iontophoresis with dexamethasone         Location: [] Take home patch   [] In clinic   10 [x]  Ice     []  heat  []  Ice massage  []  Laser   []  Anodyne Position:suipne with wedge  Location:Right knee    []  Laser with stim  []  Other:  Position:  Location:    []  Vasopneumatic Device Pressure:       [] lo [] med [] hi   Temperature: [] lo [] med [] hi   [] Skin assessment post-treatment:  []intact []redness- no adverse reaction    []redness - adverse reaction:     45 min Therapeutic Exercise:  [x] See flow sheet :   Rationale: increase ROM and increase strength to improve the patients ability to improve ease of mobility     10 min Manual Therapy:  Right knee manual stretching, patellar/tibiofemoral joint mobs, STM distal quads/hamstrings    Rationale: decrease pain, increase ROM, increase tissue extensibility and decrease trigger points to normalize gait     With   [] TE   [] TA   [] neuro   [] other: Patient Education: [x] Review HEP    [] Progressed/Changed HEP based on:   [] positioning   [] body mechanics   [] transfers   [] heat/ice application    [] other:      Other Objective/Functional Measures: see goals     Pain Level (0-10 scale) post treatment: 0    ASSESSMENT/Changes in Function: Good Right knee AROM. Patient underwent US testing today to rule out Right blood clot in the calf. Patient will continue to benefit from skilled PT services to modify and progress therapeutic interventions, address functional mobility deficits, address ROM deficits, address strength deficits, analyze and address soft tissue restrictions, analyze and cue movement patterns, analyze and modify body mechanics/ergonomics, assess and modify postural abnormalities, address imbalance/dizziness and instruct in home and community integration to attain remaining goals. []  See Plan of Care  []  See progress note/recertification  []  See Discharge Summary         Progress towards goals / Updated goals:  Goal: Patient will be independent and compliant with HEP in order to progress toward long term goals. Status at last note/certification: Issued and reviewed  Current status: Met  Long Term Goals: To be accomplished in 12 treatments:  Goal: Patient will improve FOTO assessment score to 69 in order to indicate improved functional abilities.   Status at last note/certification: 54  Current status: Progressing, 64  Goal: Patient will improve Right knee AROM to lacking 5 degrees extension to 110 flexion in order to improve ease of transfers. Status at last note/certification: Lacking 8 degrees extension to 95 flexion  Current status: Progressing, lacking 5 degrees extension to 108 flexion   Goal: Patient will report ability to negotiate a full flight of stairs with reciprocal step pattern and no increase in Right knee pain in order to improve ease of mobility in the home. Status at last note/certification: step to  Current status: Met  Goal: Patient will report ability to safely ambulate community distances without AD in order to progress toward premorbid status. Status at last note/certification: using SPC  Current status:  Met  Goal: Patient will report worst Right knee pain as 5/10 or less in order to improve quality of sleep.   Status at last note/certification: 56/26  Current status: Progressing, remains 6/10 in calf but limited in knee    PLAN  [x]  Upgrade activities as tolerated     [x]  Continue plan of care  []  Update interventions per flow sheet       []  Discharge due to:_  []  Other:_      Real Diaz, JACOBY 5/25/2018  11:34 AM    Future Appointments  Date Time Provider Trina Purcell   5/25/2018 2:00 PM Real Diaz, PT [facility]   [facility]2018 [facility]   5/30/2018 2:00 PM Sissy Cervantes, PT [facility]   6/1/2018 1:50 PM KRISTINE Tamayo   6/1/2018 2:00 PM Real Diaz, PT [facility]

## 2018-05-29 ENCOUNTER — HOSPITAL ENCOUNTER (OUTPATIENT)
Dept: PHYSICAL THERAPY | Age: 73
Discharge: HOME | End: 2018-05-29
Payer: MEDICARE

## 2018-05-29 PROCEDURE — 97140 MANUAL THERAPY 1/> REGIONS: CPT

## 2018-05-29 PROCEDURE — 97110 THERAPEUTIC EXERCISES: CPT

## 2018-05-29 NOTE — PROGRESS NOTES
PT DAILY TREATMENT NOTE - [facility]     Patient Name: Shankar Leyva  Date:2018  : 1945  [x]  Patient  Verified  Payor: [place] MEDICARE / Plan: VA MEDICARE PART A & B / Product Type: Medicare /    In time:11;30  Out time:12:30  Total Treatment Time (min): 60  Total Timed Codes (min): 50  1:1 Treatment Time ( [address] only): 30   Visit #: 8 of 12    Treatment Area: Pain in right knee [M25.561]  Presence of right artificial knee joint [Z96.651]    SUBJECTIVE  Pain Level (0-10 scale): 0  Any medication changes, allergies to medications, adverse drug reactions, diagnosis change, or new procedure performed?: [x] No    [] Yes (see summary sheet for update)  Subjective functional status/changes:   [] No changes reported  My calf is so sore at night. I've been using a heating pad, but it doesn't seem to help.     OBJECTIVE    Modality rationale: decrease inflammation and decrease pain to improve the patients ability to improve ease of function   Min Type Additional Details    [] Estim:  []Unatt       []IFC  []Premod                        []Other:  []w/ice   []w/heat  Position:  Location:    [] Estim: []Att    []TENS instruct  []NMES                    []Other:  []w/US   []w/ice   []w/heat  Position:  Location:    []  Traction: [] Cervical       []Lumbar                       [] Prone          []Supine                       []Intermittent   []Continuous Lbs:  [] before manual  [] after manual    []  Ultrasound: []Continuous   [] Pulsed                           []1MHz   []3MHz W/cm2:  Location:    []  Iontophoresis with dexamethasone         Location: [] Take home patch   [] In clinic   10 [x]  Ice     []  heat  []  Ice massage  []  Laser   []  Anodyne Position:  supine  Location: right knee    []  Laser with stim  []  Other:  Position:  Location:    []  Vasopneumatic Device Pressure:       [] lo [] med [] hi   Temperature: [] lo [] med [] hi   [x] Skin assessment post-treatment:  [x]intact []redness- no adverse reaction    []redness - adverse reaction:       40 min Therapeutic Exercise:  [] See flow sheet :   Rationale: increase ROM and increase strength to improve the patients ability to improve ease of function, sleep    10 min Manual Therapy:  STM to gastroc. Manual stretching to increase extension   Rationale: increase ROM and increase tissue extensibility to normalize gait pattern            With   [] TE   [] TA   [] neuro   [] other: Patient Education: [x] Review HEP    [] Progressed/Changed HEP based on:   [] positioning   [] body mechanics   [] transfers   [] heat/ice application    [] other:      Other Objective/Functional Measures:      Pain Level (0-10 scale) post treatment:  0    ASSESSMENT/Changes in Function: Night pain is chief complaint which is not eased with use of MH. Demo bilaterally flexed knees with gait     Patient will continue to benefit from skilled PT services to modify and progress therapeutic interventions, address functional mobility deficits, address ROM deficits, address strength deficits, analyze and address soft tissue restrictions, analyze and cue movement patterns, analyze and modify body mechanics/ergonomics, assess and modify postural abnormalities, address imbalance/dizziness and instruct in home and community integration to attain remaining goals. []  See Plan of Care  []  See progress note/recertification  []  See Discharge Summary         Progress towards goals / Updated goals:  Goal: Patient will be independent and compliant with HEP in order to progress toward long term goals. Status at last note/certification: Issued and reviewed  Current status: Met  Long Term Goals: To be accomplished in 12 treatments:  Goal: Patient will improve FOTO assessment score to 69 in order to indicate improved functional abilities.   Status at last note/certification: 54  Current status: Progressing, 64  Goal: Patient will improve Right knee AROM to lacking 5 degrees extension to 110 flexion in order to improve ease of transfers. Status at last note/certification: Lacking 8 degrees extension to 95 flexion  Current status: Progressing, lacking 5 degrees extension to 108 flexion   Goal: Patient will report ability to negotiate a full flight of stairs with reciprocal step pattern and no increase in Right knee pain in order to improve ease of mobility in the home. Status at last note/certification: step to  Current status: Met  Goal: Patient will report ability to safely ambulate community distances without AD in order to progress toward premorbid status. Status at last note/certification: using SPC  Current status:  Met  Goal: Patient will report worst Right knee pain as 5/10 or less in order to improve quality of sleep.   Status at last note/certification: 58/49  Current status: Progressing, remains 6/10 in calf but limited in knee    PLAN  [x]  Upgrade activities as tolerated     []  Continue plan of care  []  Update interventions per flow sheet       []  Discharge due to:_  []  Other:_      Charles Villela, QUINN 5/29/2018  12:27 PM    Future Appointments  Date Time Provider Trina Purcell   5/30/2018 2:00 PM Reynaldo Cervantes, PT [facility]   6/1/2018 8:00  [address] [facility]   6/1/2018 1:50 PM KRISTINE Merino Rafael 69

## 2018-05-30 ENCOUNTER — HOSPITAL ENCOUNTER (OUTPATIENT)
Dept: PHYSICAL THERAPY | Age: 73
Discharge: HOME | End: 2018-05-30
Payer: MEDICARE

## 2018-05-30 PROCEDURE — 97140 MANUAL THERAPY 1/> REGIONS: CPT

## 2018-05-30 PROCEDURE — 97110 THERAPEUTIC EXERCISES: CPT

## 2018-05-30 NOTE — PROGRESS NOTES
PT DAILY TREATMENT NOTE - [facility]     Patient Name: James Tate  Date:2018  : 1945  [x]  Patient  Verified  Payor: VA MEDICARE / Plan: VA MEDICARE PART A & B / Product Type: Medicare /    In time:2:05  Out time:3:05  Total Treatment Time (min): 60  Total Timed Codes (min): 50  1:1 Treatment Time ( [address] only): 35   Visit #: 9 of 12    Treatment Area: Pain in right knee [M25.561]  Presence of right artificial knee joint [Z96.651]    SUBJECTIVE  Pain Level (0-10 scale): 0/10  Any medication changes, allergies to medications, adverse drug reactions, diagnosis change, or new procedure performed?: [x] No    [] Yes (see summary sheet for update)  Subjective functional status/changes:   [] No changes reported  \"I feel pretty good. I still get that pain in my calf at night, don't know why, but it doesn't bother me throughout the day. \"    OBJECTIVE    Modality rationale: decrease inflammation and decrease pain to improve the patients ability to improve ease of function   Min Type Additional Details    [] Estim:  []Unatt       []IFC  []Premod                        []Other:  []w/ice   []w/heat  Position:  Location:    [] Estim: []Att    []TENS instruct  []NMES                    []Other:  []w/US   []w/ice   []w/heat  Position:  Location:    []  Traction: [] Cervical       []Lumbar                       [] Prone          []Supine                       []Intermittent   []Continuous Lbs:  [] before manual  [] after manual    []  Ultrasound: []Continuous   [] Pulsed                           []1MHz   []3MHz W/cm2:  Location:    []  Iontophoresis with dexamethasone         Location: [] Take home patch   [] In clinic   10 [x]  Ice     []  heat  []  Ice massage  []  Laser   []  Anodyne Position:  supine  Location: right knee    []  Laser with stim  []  Other:  Position:  Location:    []  Vasopneumatic Device Pressure:       [] lo [] med [] hi   Temperature: [] lo [] med [] hi   [x] Skin assessment post-treatment: [x]intact []redness- no adverse reaction    []redness - adverse reaction:       35 min Therapeutic Exercise:  [] See flow sheet :   Rationale: increase ROM and increase strength to improve the patients ability to improve ease of function, sleep    15 min Manual Therapy: STM to distal right quad, HS, ITB; ant tibiofemoral glides   Rationale: increase ROM and increase tissue extensibility to normalize gait pattern            With   [] TE   [] TA   [] neuro   [] other: Patient Education: [x] Review HEP    [] Progressed/Changed HEP based on:   [] positioning   [] body mechanics   [] transfers   [] heat/ice application    [] other:      Other Objective/Functional Measures: Continued with Rx program per flow sheet. Pain Level (0-10 scale) post treatment: 0/10    ASSESSMENT/Changes in Function: Pt has made good progress with therapy. Pt notes minimal to no pain on average and is amb community distances without A.D on various surfaces without pain or difficulty. Pt continues to exhibit right knee extension lag but has made consistent gains in right knee flexion AROM. Pt reports no functional deficits but notes tightness in back of leg in reference to knee extension limitations. Pt is compliant with HEP. Patient will continue to benefit from skilled PT services to modify and progress therapeutic interventions, address functional mobility deficits, address ROM deficits, address strength deficits, analyze and address soft tissue restrictions, analyze and cue movement patterns, analyze and modify body mechanics/ergonomics, assess and modify postural abnormalities, address imbalance/dizziness and instruct in home and community integration to attain remaining goals. []  See Plan of Care  []  See progress note/recertification  []  See Discharge Summary         Progress towards goals / Updated goals:  Short Term Goals:  To be accomplished in 1 week:  Goal: Patient will be independent and compliant with HEP in order to progress toward long term goals. Status at last note/certification: Issued and reviewed  Current status: met - Pt reports compliance  Long Term Goals: To be accomplished in 12 treatments:  Goal: Patient will improve FOTO assessment score to 69 in order to indicate improved functional abilities. Status at last note/certification: 54  Current status: progressing - FOTO 64 pts  Goal: Patient will improve Right knee AROM to lacking 5 degrees extension to 110 flexion in order to improve ease of transfers. Status at last note/certification: Lacking 8 degrees extension to 95 flexion  Current status: progressing - lacking 5 degrees extension to 108 flexion   Goal: Patient will report ability to negotiate a full flight of stairs with reciprocal step pattern and no increase in Right knee pain in order to improve ease of mobility in the home. Status at last note/certification: step to  Current status: met - reciprocal pattern ascending/descending - only slight pull  Goal: Patient will report ability to safely ambulate community distances without AD in order to progress toward premorbid status. Status at last note/certification: using SPC  Current status: met - amb independently without deviations or difficulty  Goal: Patient will report worst Right knee pain as 5/10 or less in order to improve quality of sleep. Status at last note/certification: 49/53  Current status: met - 0/10 pain on average; 6/10 pain in right calf only at night    PLAN  [x]  Upgrade activities as tolerated     []  Continue plan of care  []  Update interventions per flow sheet       []  Discharge due to:_  [x]  Other:_ D/C by end of cert period?  Pt to decide next visit     Medina Clement PT 5/30/2018  12:27 PM    Future Appointments  Date Time Provider Trina Purcell   6/1/2018 8:00  [address] [facility]   6/1/2018 1:50 PM KRISTINE Cotter 69

## 2018-06-01 ENCOUNTER — HOSPITAL ENCOUNTER (OUTPATIENT)
Dept: PHYSICAL THERAPY | Age: 73
Discharge: HOME | End: 2018-06-01
Payer: MEDICARE

## 2018-06-01 ENCOUNTER — OFFICE VISIT (OUTPATIENT)
Dept: ORTHOPEDIC SURGERY | Age: 73
End: 2018-06-01

## 2018-06-01 VITALS
BODY MASS INDEX: 22.58 KG/M2 | DIASTOLIC BLOOD PRESSURE: 66 MMHG | HEART RATE: 83 BPM | OXYGEN SATURATION: 100 % | HEIGHT: 60 IN | TEMPERATURE: 97.8 F | SYSTOLIC BLOOD PRESSURE: 125 MMHG | RESPIRATION RATE: 16 BRPM | WEIGHT: 115 LBS

## 2018-06-01 DIAGNOSIS — Z96.651 STATUS POST TOTAL RIGHT KNEE REPLACEMENT: ICD-10-CM

## 2018-06-01 DIAGNOSIS — M25.561 RIGHT KNEE PAIN, UNSPECIFIED CHRONICITY: Primary | ICD-10-CM

## 2018-06-01 PROCEDURE — 97140 MANUAL THERAPY 1/> REGIONS: CPT

## 2018-06-01 PROCEDURE — G8979 MOBILITY GOAL STATUS: HCPCS

## 2018-06-01 PROCEDURE — 97110 THERAPEUTIC EXERCISES: CPT

## 2018-06-01 PROCEDURE — G8980 MOBILITY D/C STATUS: HCPCS

## 2018-06-01 RX ORDER — AMOXICILLIN 500 MG/1
CAPSULE ORAL
Qty: 4 CAP | Refills: 3 | Status: SHIPPED | OUTPATIENT
Start: 2018-06-01 | End: 2018-07-10

## 2018-06-01 RX ORDER — GABAPENTIN 100 MG/1
100 CAPSULE ORAL 2 TIMES DAILY
Qty: 60 CAP | Refills: 0 | Status: SHIPPED | OUTPATIENT
Start: 2018-06-01 | End: 2018-07-10

## 2018-06-01 NOTE — PROGRESS NOTES
Patient: Marlene Kwon  YOB: 1945       HISTORY:  The patient presents for reevaluation of her s/p right total knee arthroplasty on 4/17/18. Patient is improved, states pain is a 1 out of 10.  she has participated in outpatient physical therapy and has finished at this point. has been doing knee exercises at home. She has continued to have the right calf pain since about 2 weeks post op. Only at night and she describes it as a constant ache. She puts heat on her calf at night and is not sure if it makes it better or she just falls asleep. DVT study from her last visit was neg. She has known Scoliosis. Patient denies any fever, chills, chest pain, shortness of breath. There are no new illness or injuries to report since last seen in the office. PHYSICAL EXAMINATION:    Visit Vitals    /66 (BP 1 Location: Left arm, BP Patient Position: Sitting)    Pulse 83    Temp 97.8 °F (36.6 °C) (Oral)    Resp 16    Ht 5' (1.524 m)    Wt 115 lb (52.2 kg)    SpO2 100%    BMI 22.46 kg/m2     The patient is a well-developed, well-nourished female in no acute distress. The patient is alert and oriented times three. The patient appears to be well groomed. Mood and affect are normal.   ORTHOPEDIC EXAM of Right knee: Inspection: Effusion present,  Incision well healed  TTP: right calf  Range of motion: -2-120 flexion  Stability: Stable   Strength: 5/5  2+ distal pulses  NV intact, Neg straight leg raise, neg ttp to low back, SI joint, paraspinous muscles      IMPRESSION:  Status post Right total knee arthroplasty. PLAN:  Pt doing well post operatively  Patient is weight bearing as tolerated. Pt will start a low dose Neurontin for a couple weeks to see if it helps her calf pain. She wants to think about doing an MRI L-Spine at a later date if things don't improve. Pt also given an rx for amoxicillin for her upcoming dental procedure. Will continue outpt physical therapy.    Patient not given refill of pain medication. Patient will follow up in 4 weeks.      [facility]

## 2018-06-01 NOTE — PROGRESS NOTES
PT DAILY TREATMENT NOTE - [facility]     Patient Name: Waqas So  Date:2018  : 1945  [x]  Patient  Verified  [place]or: [place] MED[place]ARE / Plan: [place] MEDICARE PART A & B / Product Type: Medicare /    In time:10:25  Out time:11:35  Total Treatment Time (min): 70  Total Timed Codes (min): 70  1:1 Treatment Time ( [address] only): 30   Visit #: 10 of 12    Treatment Area: Pain in right knee [M25.561]  Presence of right artificial knee joint [Z96.651]    SUBJECTIVE  Pain Level (0-10 scale):  0  Any medication changes, allergies to medications, adverse drug reactions, diagnosis change, or new procedure performed?: [x] No    [] Yes (see summary sheet for update)  Subjective functional status/changes:   [] No changes reported  I'm a little scared to stop PT, but I will try my HP on my own    OBJECTIVE      60 min Therapeutic Exercise:  [] See flow sheet :   Rationale: increase ROM and increase strength to improve the patients ability to ease of walking, ADL's    10 min Manual Therapy:  manual stretching, TF mobs and STM to quads, HS   Rationale: increase ROM and increase tissue extensibility to improve ease of walking, steps          With   [] TE   [] TA   [] neuro   [] other: Patient Education: [x] Review HEP    [] Progressed/Changed HEP based on:   [] positioning   [] body mechanics   [] transfers   [] heat/ice application    [] other:      Other Objective/Functional Measures:   No significant limitations due to her knee.   Does report fatigue with walking that she feels is not related to surgery     Pain Level (0-10 scale) post treatment:  0    ASSESSMENT/Changes in Function:  See goals    Patient will continue to benefit from skilled PT services to modify and progress therapeutic interventions, address functional mobility deficits, address ROM deficits, address strength deficits, analyze and address soft tissue restrictions, analyze and cue movement patterns, analyze and modify body mechanics/ergonomics and assess and modify postural abnormalities to attain remaining goals. []  See Plan of Care  []  See progress note/recertification  []  See Discharge Summary         Progress towards goals / Updated goals:  Goal: Patient will be independent and compliant with HEP in order to progress toward long term goals. Status at last note/certification: Issued and reviewed  Current status: met - Pt reports compliance  Long Term Goals: To be accomplished in 12 treatments:  Goal: Patient will improve FOTO assessment score to 69 in order to indicate improved functional abilities. Status at last note/certification: 54  Current status: progressing - FOTO 64 pts  Goal: Patient will improve Right knee AROM to lacking 5 degrees extension to 110 flexion in order to improve ease of transfers. Status at last note/certification: Lacking 8 degrees extension to 95 flexion  Current status: progressing - lacking 5 degrees extension to 113 flexion   Goal: Patient will report ability to negotiate a full flight of stairs with reciprocal step pattern and no increase in Right knee pain in order to improve ease of mobility in the home. Status at last note/certification: step to  Current status: met - reciprocal pattern ascending/descending - only slight pull  Goal: Patient will report ability to safely ambulate community distances without AD in order to progress toward premorbid status. Status at last note/certification: using SPC  Current status: met - amb independently without deviations or difficulty  Goal: Patient will report worst Right knee pain as 5/10 or less in order to improve quality of sleep. Status at last note/certification: 76/56  Current status: met - 0/10 pain on average; 6/10 pain in right calf only at night    PLAN  []  Upgrade activities as tolerated     []  Continue plan of care  []  Update interventions per flow sheet       []  Discharge due to:_  [x]  Other:_  Will hold 2 weeks pending pt's assessment of managing symptoms on own. Chilo Valencia, QUINN 6/1/2018  10:51 AM    Future Appointments  Date Time Provider Trina Purcell   6/1/2018 1:50 PM Phoebe Holstein, PA-C [address]

## 2018-06-15 NOTE — PROGRESS NOTES
[facility] - [facility], [address] [place] [address]  [phone] fax    Discharge Summary      Patient name: Zackery Najera Start of Care: 2018   Referral source: Sarthak Lentz MD : 1945                         Medical Diagnosis: Pain in right knee [M25.561]  Presence of right artificial knee joint [Z96.651] Onset Date:18                         Treatment Diagnosis: Right knee pain (s/p TKA)   Prior Hospitalization: see medical history Provider#: [id number]   Medications: Verified on Patient summary List    Comorbidities: arthritis, scoliosis   Prior Level of Function: Functionally independent, retired teacher, lives with  in two level home      Visits from [facility]: 10    Missed Visits: 0  Reporting Period : 18 to 18    Goal: Patient will be independent and compliant with HEP in order to progress toward long term goals. Status at last note/certification: Issued and reviewed  Current status: met - Pt reports compliance  Long Term Goals: To be accomplished in 12 treatments:  Goal: Patient will improve FOTO assessment score to 69 in order to indicate improved functional abilities. Status at last note/certification: 54  Current status: progressing - FOTO 64 pts  Goal: Patient will improve Right knee AROM to lacking 5 degrees extension to 110 flexion in order to improve ease of transfers. Status at last note/certification: Lacking 8 degrees extension to 95 flexion  Current status: progressing - lacking 5 degrees extension to 113 flexion   Goal: Patient will report ability to negotiate a full flight of stairs with reciprocal step pattern and no increase in Right knee pain in order to improve ease of mobility in the home.   Status at last note/certification: step to  Current status: met - reciprocal pattern ascending/descending - only slight pull  Goal: Patient will report ability to safely ambulate community distances without AD in order to progress toward premorbid status. Status at last note/certification: using SPC  Current status: met - amb independently without deviations or difficulty  Goal: Patient will report worst Right knee pain as 5/10 or less in order to improve quality of sleep. Status at last note/certification: 02/27  Current status: met - 0/10 pain on average; 6/10 pain in right calf only at night    G-Codes (GP)  Mobility    Goal  CJ= 20-39%  D/C  CK= 40-59%     The severity rating is based on clinical judgment and the FOTO score. Assessment/ Summary of Care: Patient consistently attended therapy following Right TKA. While in therapy she made good gains in knee ROM and reported good improvement in functional mobility. Patient was placed on hold to follow up with MD; when contacted to scheduling more sessions she requested discharge due to independence with HEP and no functional deficits. Thank you for the referral of this Patient.      RECOMMENDATIONS:  [x]Discontinue therapy: [x]Patient has reached or is progressing toward set goals      []Patient is non-compliant or has abdicated      []Due to lack of appreciable progress towards set [facility], PT 6/15/2018 1:40 PM

## 2018-06-21 ENCOUNTER — HOSPITAL ENCOUNTER (OUTPATIENT)
Dept: GENERAL RADIOLOGY | Age: 73
Discharge: HOME | End: 2018-06-21
Payer: MEDICARE

## 2018-06-21 DIAGNOSIS — I10 HYPERTENSION, ESSENTIAL: ICD-10-CM

## 2018-06-21 PROCEDURE — 71046 X-RAY EXAM CHEST 2 VIEWS: CPT

## 2018-07-05 ENCOUNTER — HOSPITAL ENCOUNTER (OUTPATIENT)
Dept: CT IMAGING | Age: 73
Discharge: HOME | End: 2018-07-05
Attending: INTERNAL MEDICINE
Payer: MEDICARE

## 2018-07-05 DIAGNOSIS — R93.89 ABNORMAL FINDINGS ON DIAGNOSTIC IMAGING OF OTHER SPECIFIED BODY STRUCTURES: ICD-10-CM

## 2018-07-05 DIAGNOSIS — J98.59 MEDIASTINAL MASS: ICD-10-CM

## 2018-07-05 PROCEDURE — 74011636320 HC RX REV CODE- 636/320: Performed by: INTERNAL MEDICINE

## 2018-07-05 PROCEDURE — 74160 CT ABDOMEN W/CONTRAST: CPT

## 2018-07-05 RX ADMIN — IOPAMIDOL 100 ML: 612 INJECTION, SOLUTION INTRAVENOUS at 15:00

## 2018-07-06 ENCOUNTER — OFFICE VISIT (OUTPATIENT)
Dept: ORTHOPEDIC SURGERY | Age: 73
End: 2018-07-06

## 2018-07-06 VITALS
HEIGHT: 60 IN | DIASTOLIC BLOOD PRESSURE: 71 MMHG | TEMPERATURE: 98.6 F | OXYGEN SATURATION: 98 % | SYSTOLIC BLOOD PRESSURE: 126 MMHG | RESPIRATION RATE: 16 BRPM | HEART RATE: 78 BPM | BODY MASS INDEX: 22.7 KG/M2 | WEIGHT: 115.6 LBS

## 2018-07-06 DIAGNOSIS — Z96.651 STATUS POST TOTAL RIGHT KNEE REPLACEMENT: Primary | ICD-10-CM

## 2018-07-06 RX ORDER — DICLOFENAC SODIUM 10 MG/G
4 GEL TOPICAL 4 TIMES DAILY
Qty: 100 G | Refills: 2 | Status: SHIPPED | OUTPATIENT
Start: 2018-07-06 | End: 2018-07-10

## 2018-07-06 NOTE — PROGRESS NOTES
Patient: Yue Fuentes  YOB: 1945       HISTORY:  The patient presents for reevaluation of her s/p right total knee arthroplasty on 4/17/18. Patient is improved, states pain is a 0 out of 10.  she has participated in outpatient physical therapy and has finished at this point. has been doing knee exercises at home. She still has the occasional right knee pain at night. Ice does help. Her calf pain has resolved. She has known Scoliosis. Patient denies any fever, chills, chest pain, shortness of breath. There are no new illness or injuries to report since last seen in the office. PHYSICAL EXAMINATION:    Visit Vitals    /71    Pulse 78    Temp 98.6 °F (37 °C) (Oral)    Resp 16    Ht 5' (1.524 m)    Wt 115 lb 9.6 oz (52.4 kg)    SpO2 98%    BMI 22.58 kg/m2     The patient is a well-developed, well-nourished female in no acute distress. The patient is alert and oriented times three. The patient appears to be well groomed. Mood and affect are normal.   ORTHOPEDIC EXAM of Right knee: Inspection: Effusion present,  Incision well healed  TTP: none  Range of motion: -2-120 flexion  Stability: Stable   Strength: 5/5  2+ distal pulses  NV intact      IMPRESSION:  Status post Right total knee arthroplasty. PLAN:  Pt doing well post operatively - still having the pain at night. She will continue to ice and she will try volteran gel to help with the discomfort  Patient is weight bearing as tolerated. She has finished outpt physical therapy. Patient not given refill of pain medication. Patient will follow up in 3 months.      [facility]

## 2018-07-10 ENCOUNTER — TELEPHONE (OUTPATIENT)
Dept: CARDIOLOGY CLINIC | Age: 73
End: 2018-07-10

## 2018-07-10 ENCOUNTER — OFFICE VISIT (OUTPATIENT)
Dept: CARDIOLOGY CLINIC | Age: 73
End: 2018-07-10

## 2018-07-10 VITALS
HEART RATE: 74 BPM | DIASTOLIC BLOOD PRESSURE: 66 MMHG | BODY MASS INDEX: 22.58 KG/M2 | HEIGHT: 60 IN | WEIGHT: 115 LBS | SYSTOLIC BLOOD PRESSURE: 116 MMHG | OXYGEN SATURATION: 99 %

## 2018-07-10 DIAGNOSIS — K44.9 HIATAL HERNIA: ICD-10-CM

## 2018-07-10 DIAGNOSIS — R07.9 CHEST PAIN, UNSPECIFIED TYPE: Primary | ICD-10-CM

## 2018-07-10 DIAGNOSIS — M41.9 KYPHOSCOLIOSIS DEFORMITY OF SPINE: ICD-10-CM

## 2018-07-10 NOTE — MR AVS SNAPSHOT
[address] [place] [address] [address] [place] [address] 
[phone] Patient: Yue Fuentes MRN: [id number] SNW:[id number] Visit Information Date & Time Provider Department Dept. Phone Encounter #  
 7/10/2018  3:20 PM Lamin Lawson, [address] [place] [address] [facility] [id number] Follow-up Instructions Return if symptoms worsen or fail to improve. Your Appointments 10/12/2018  1:00 PM  
Follow Up with Michael Suazo, [address] [place] [address] ([address]) Appt Note: RT KNEE 3 mo fu  
 [facility], [address] [facility]  
[phone] [address] [facility], [facility] [address] Upcoming Health Maintenance Date Due Hepatitis C Screening 1945 DTaP/Tdap/Td series (1 - Tdap) 7/7/1966 FOBT Q 1 YEAR AGE 50-75 7/7/1995 ZOSTER VACCINE AGE 60> 5/7/2005 GLAUCOMA SCREENING Q2Y 7/7/2010 Pneumococcal 65+ Low/Medium Risk (1 of 2 - PCV13) 7/7/2010 BREAST CANCER SCRN MAMMOGRAM 12/29/2017 MEDICARE YEARLY EXAM 7/6/2018 Influenza Age 5 to Adult 8/1/2018 Allergies as of 7/10/2018  Review Complete On: 7/10/2018 By: Lamin Lawson, DO No Known Allergies Current Immunizations  Never Reviewed No immunizations on file. Not reviewed this visit You Were Diagnosed With   
  
 Codes Comments Chest pain, unspecified type    -  Primary ICD-10-CM: R07.9 ICD-9-CM: 786.50 Vitals BP Pulse Height(growth percentile) Weight(growth percentile) SpO2 BMI  
 116/66 74 5' (1.524 m) 115 lb (52.2 kg) 99% 22.46 kg/m2 OB Status Smoking Status Postmenopausal Never Smoker Vitals History BMI and BSA Data Body Mass Index Body Surface Area  
 22.46 kg/m 2 1.49 m 2 Preferred Pharmacy Pharmacy Name Phone [facility] [address], [address] [phone] Your Updated Medication List  
  
   
This list is accurate as of 7/10/18  4:35 PM.  Always use your most recent med list.  
  
  
  
  
 ALEVE 220 mg Cap Generic drug:  naproxen sodium Take 2 Caplet by mouth nightly as needed (pain). aspirin 81 mg chewable tablet Take 3 Tabs by mouth two (2) times a day. celecoxib 200 mg capsule Commonly known as:  CELEBREX Take 1 Cap by mouth every twelve (12) hours every twelve (12) hours for 90 days. cholecalciferol 1,000 unit tablet Commonly known as:  VITAMIN D3 Take 5,000 Units by mouth daily. MAGNESIUM CARBONATE PO Take 400 mg by mouth daily. multivitamin tablet Commonly known as:  ONE A DAY Take 1 Tab by mouth daily. We Performed the Following AMB POC EKG ROUTINE W/ 12 LEADS, INTER & REP [24097 CPT(R)] Follow-up Instructions Return if symptoms worsen or fail to improve. Introducing hospitals & HEALTH SERVICES! Lam [employer] introduces [employer] patient portal. Now you can access parts of your medical record, email your doctor's office, and request medication refills online. 1. In your internet browser, go to [email or web address] 2. Click on the First Time User? Click Here link in the Sign In box. You will see the New Member Sign Up page. 3. Enter your [employer] Access Code exactly as it appears below. You will not need to use this code after youve completed the sign-up process. If you do not sign up before the expiration date, you must request a new code. · [employer] Access Code: [email or web address] Expires: 7/26/2018  7:56 AM 
 
4. Enter the last four digits of your Social Security Number (xxxx) and Date of Birth (mm/dd/yyyy) as indicated and click Submit. You will be taken to the next sign-up page. 5. Create a [employer] ID.  This will be your [employer] login ID and cannot be changed, so think of one that is secure and easy to remember. 6. Create a [employer] password. You can change your password at any time. 7. Enter your Password Reset Question and Answer. This can be used at a later time if you forget your password. 8. Enter your e-mail address. You will receive e-mail notification when new information is available in [address] 9. Click Sign Up. You can now view and download portions of your medical record. 10. Click the Download Summary menu link to download a portable copy of your medical information. If you have questions, please visit the Frequently Asked Questions section of the [employer] website. Remember, [employer] is NOT to be used for urgent needs. For medical emergencies, dial 911. Now available from your iPhone and Android! Please provide this summary of care documentation to your next provider. Your primary care clinician is listed as Maria Victoria Hanley. If you have any questions after today's visit, please call [phone].

## 2018-07-10 NOTE — PROGRESS NOTES
Review of Systems   Constitutional: Negative. HENT: Positive for tinnitus. Negative for congestion, ear discharge, ear pain, hearing loss, nosebleeds, sinus pain and sore throat. Eyes: Negative. Respiratory: Positive for shortness of breath. Negative for cough, hemoptysis, sputum production, wheezing and stridor. Cardiovascular: Positive for chest pain, claudication and leg swelling. Negative for palpitations, orthopnea and PND. Gastrointestinal: Negative. Genitourinary: Negative. Musculoskeletal: Positive for back pain. Skin: Negative. Neurological: Negative. Endo/Heme/Allergies: Positive for environmental allergies. Negative for polydipsia. Does not bruise/bleed easily.

## 2018-07-10 NOTE — PROGRESS NOTES
HPI: I saw Mona Najera in my office today in cardiovascular evaluation due to some recent past history of chest pain which sounded very atypical for angina. Mrs. Omayra Calderon is a pleasant 49-year-old  female who has history of a total right knee replacement by Dr. Sharmin Reich back on April 17, 2018. She relates that following that procedure she did well for about 3 weeks, but then developed some pain under her left breast with radiation to the epigastrium and occasionally to her mid back. This discomfort was poorly described as to quality, but she related that the discomfort occurred 2-3 times a week over the next several weeks lasting for a few hours to several hours at a time and was unassociated with any chest wall tenderness, pleuritic accentuation, or change with body movement. She relates that discomfort seemed always occur at rest and she never really have the discomfort when working out in physical therapy. The discomfort persisted for several weeks and simply stopped about 2 weeks ago. It should be noted that the patient had a chest x-ray on June 21, 2018 suggesting a possible hiatal hernia and subsequently a CT of the chest on July 5, 2018 confirming a large hiatal hernia containing intrathoracic stomach and a segment of the transverse colon without obstruction. She was also noted to have some diverticulosis and some small chronic subpleural lung nodules which appear to be stable. The patient's only pertinent past medical history is hypertension and her osteoarthritis issues prompting her right total knee replacement. She has no past history of diabetes, hypercholesterolemia, or cigarette abuse. Encounter Diagnoses   Name Primary?  Chest pain, non cardiac Yes    Large hiatal hernia     Kyphoscoliosis deformity of spine        Discussion:  This patient's pain is very hard to sort out, but it developed about 3 weeks after she had her right total knee replacement and she does have a lot of kyphoscoliosis of her thoracic spine and certainly due to the use of of a cane in her right hand to walk for couple weeks at home along with her walker and early in rehab, I suspect that she cause some strain on her left chest which caused her pain syndrome which has now resolved due to the fact that she is now walking normally without any assistance. Her discomfort could potentially be from her large hiatal hernia and some reflux issues, but I suspect it is more likely musculoskeletal. Certainly if her epigastric discomfort should worsen a case could be made for use of over-the-counter Nexium or Prilosec particularly since this lady is taking both Aleve, Celebrex, and aspirin which could have created issues with gastritis. I do not feel that any further cardiovascular workup is necessary at this time and will leave further management of these problems to Dr. Jaxson Chacko. PCP: Kevan Salmeron MD      Past Medical History:   Diagnosis Date    Hypertension        Past Surgical History:   Procedure Laterality Date    HX HERNIA REPAIR      together with tummy tuck     HX HYSTERECTOMY      HX KNEE REPLACEMENT Right 04/17/2018       Current Outpatient Prescriptions   Medication Sig    naproxen sodium (ALEVE) 220 mg cap Take 2 Caplet by mouth nightly as needed (pain).  cholecalciferol (VITAMIN D3) 1,000 unit tablet Take 5,000 Units by mouth daily.  celecoxib (CELEBREX) 200 mg capsule Take 1 Cap by mouth every twelve (12) hours every twelve (12) hours for 90 days.  aspirin 81 mg chewable tablet Take 3 Tabs by mouth two (2) times a day.  multivitamin (ONE A DAY) tablet Take 1 Tab by mouth daily.  MAGNESIUM CARBONATE PO Take 400 mg by mouth daily. No current facility-administered medications for this visit.         No Known Allergies    Social History :  Social History   Substance Use Topics    Smoking status: Never Smoker    Smokeless tobacco: Never Used    Alcohol use 0.5 oz/week     1 Glasses of wine per week      Comment: occasionally        Family History: family history includes Heart Attack (age of onset: 79) in her father and mother; Other (age of onset: 48) in her brother. Review of Systems:   Constitutional: Negative. HENT: Positive for tinnitus. Negative for congestion, ear discharge, ear pain, hearing loss, nosebleeds, sinus pain and sore throat. Eyes: Negative. Respiratory: Positive for shortness of breath. Negative for cough, hemoptysis, sputum production, wheezing and stridor. Cardiovascular: Positive for chest pain, claudication and leg swelling. Negative for palpitations, orthopnea and PND. Gastrointestinal: Negative. Genitourinary: Negative. Musculoskeletal: Positive for back pain. Skin: Negative. Neurological: Negative. Endo/Heme/Allergies: Positive for environmental allergies. Negative for polydipsia. Does not bruise/bleed easily. Physical Exam:    The patient is a cooperative, alert, well developed, well nourished 68 y.o.  female who is in no acute distress at the time of the examination. Visit Vitals    /66    Pulse 74    Ht 5' (1.524 m)    Wt 52.2 kg (115 lb)    SpO2 99%    BMI 22.46 kg/m2       HEENT: Conjuctiva white, mucosa moist, no pallor or cyanosis. NECK: Supple without masses, tenderness or thyromegaly. There was no jugular venous distention. Carotid are full bilaterally without bruits. CHEST: Symmetrical with good excursion. LUNGS: Clear to auscultation in all fields. HEART: The apex is not displaced. There were no lifts, thrills or heaves. There is a normal S1 and S2 without appreciable murmurs, rubs, clicks, or gallops auscultated. ABDOMEN: There is a very taut midline scar from the subxiphoid process to the umbilicus with some keloid formation and some tenderness to palpation in this area without any hepatosplenomegaly. EXTREMITIES: Full peripheral pulses without peripheral edema.   There is a scar with some keloid on the right knee from recent right total knee replacement. INTEGUMENT: Warm and dry   NEUROLOGICAL: The patient is oriented x 3 with motor function grossly intact. Review of Data: See PMH and Cardiology and Imaging sections for cardiac testing  Lab Results   Component Value Date/Time    Cholesterol, total 170 09/20/2013 05:06 PM    HDL Cholesterol 65 (H) 09/20/2013 05:06 PM    LDL, calculated 86.4 09/20/2013 05:06 PM    Triglyceride 93 09/20/2013 05:06 PM    CHOL/HDL Ratio 2.6 09/20/2013 05:06 PM       Results for orders placed or performed in visit on 07/10/18   AMB POC EKG ROUTINE W/ 12 LEADS, INTER & REP     Status: None    Narrative    Normal sinus rhythm rate 74. Low voltage anterolaterally without acute change. Joretta Favre, D.O., F.A.C.C. [facility]  [facility] [place]  [address] [place] [address]. [address] [place] [address]  [phone]      PLEASE NOTE:  This document has been produced using voice recognition software. Unrecognized errors in transcription may be present.

## 2018-07-11 ENCOUNTER — DOCUMENTATION ONLY (OUTPATIENT)
Dept: CARDIOLOGY CLINIC | Age: 73
End: 2018-07-11

## 2018-07-11 NOTE — PROGRESS NOTES
Office note sent . Dmitriy Nick Per Dr. Jerry Hyaes. Lyle Jasso  Female, 68 y.o., 1945  Weight:   52.2 kg (115 lb)  Phone:   [phone]  PCP:   Sarah Terry MD  MRN:   [id number]  MyChart:   Pending  Next Appt (With Me)  None  Next Appt (Any Provider)  10/12/2018    Message  Received: Nathalie Frye DO sent to Marce Keita                     Copy to Dr. Edith Luu Visit for New Patient;  Chest Pain (Angina)  7/10/2018       Adeola Frye DO - [facility] \[facility]\"" Encounter Summary       Diagnoses       Chest Pain, Unspecified Type (Primary)

## 2018-08-15 ENCOUNTER — OFFICE VISIT (OUTPATIENT)
Dept: SURGERY | Age: 73
End: 2018-08-15

## 2018-08-15 VITALS
BODY MASS INDEX: 22.58 KG/M2 | OXYGEN SATURATION: 98 % | SYSTOLIC BLOOD PRESSURE: 106 MMHG | WEIGHT: 115 LBS | HEIGHT: 60 IN | RESPIRATION RATE: 16 BRPM | DIASTOLIC BLOOD PRESSURE: 64 MMHG | HEART RATE: 74 BPM

## 2018-08-15 DIAGNOSIS — K44.9 HIATAL HERNIA: Primary | ICD-10-CM

## 2018-08-15 NOTE — PROGRESS NOTES
Progress Note    Patient: Vidal Scales  MRN: [id number]  SSN: [id number]   YOB: 1945  Age: 68 y.o. Sex: female     Chief Complaint   Patient presents with    Hernia (Non Specific)     ct        HPI    Ms. Khai Le is a 70-year-old woman who for at least 5 years has had a very large hiatal hernia. She is asymptomatic and this was found for other reasons. She was apparently having some chest pain after a knee replacement and was worked up and noted on a chest x-ray to have part of her stomach and colon in the retrocardiac region. She would not is noted otherwise and is pretty clear that she has had this a very long time. She is completely uninterested in a surgical repair. She does not describe any symptoms such as regurgitation her ongoing chest pain. Past Medical History:   Diagnosis Date    Hypertension      Past Surgical History:   Procedure Laterality Date    HX HERNIA REPAIR      together with tummy tuck     HX HYSTERECTOMY      HX KNEE REPLACEMENT Right 04/17/2018     No Known Allergies  Current Outpatient Prescriptions   Medication Sig Dispense Refill    cholecalciferol (VITAMIN D3) 1,000 unit tablet Take 5,000 Units by mouth daily.  aspirin 81 mg chewable tablet Take 3 Tabs by mouth two (2) times a day. 60 Tab 0    multivitamin (ONE A DAY) tablet Take 1 Tab by mouth daily.  MAGNESIUM CARBONATE PO Take 400 mg by mouth daily.  naproxen sodium (ALEVE) 220 mg cap Take 2 Caplet by mouth nightly as needed (pain). Social History     Social History    Marital status:      Spouse name: N/A    Number of children: N/A    Years of education: N/A     Occupational History    Not on file.      Social History Main Topics    Smoking status: Never Smoker    Smokeless tobacco: Never Used    Alcohol use 0.5 oz/week     1 Glasses of wine per week      Comment: occasionally    Drug use: No    Sexual activity: Yes     Partners: Male     Other Topics Concern    Not on file     Social History Narrative     Family History   Problem Relation Age of Onset    Heart Attack Mother 79    Heart Attack Father 79    Other Brother 48     Mesothelioma         Review of systems:  Patient denies any reflux, emesis, abdominal pain, change in bowel habits, hematochezia, melena, fever, weight loss, fatigue chills, dermatitis, abnormal moles, change in vision, vertigo, epistaxis, dysphagia, hoarseness, chest pain, palpitations, hypertension, edema, cough, shortness of breath, wheezing, hemoptysis, snoring, hematuria, diabetes, thyroid disease, anemia, bruising, history of blood transfusion, dizziness, headache, or fainting. Physical Examination    Well developed well nourished female in no apparent distress  Visit Vitals    /64    Pulse 74    Resp 16    Ht 5' (1.524 m)    Wt 52.2 kg (115 lb)    SpO2 98%    BMI 22.46 kg/m2      Head: normocephalic, atraumatic  Mouth: Clear, no overt lesions, oral mucosa pink and moist  Neck: supple, no masses, no adenopathy or carotid bruits, trachea midline  Resp: clear to auscultation bilaterally, no wheeze, rhonchi or rales, excursions normal and symmetrical  Cardio: Regular rate and rhythm, no murmurs, clicks, gallops or rubs, no edema or varicosities  Abdomen: soft, nontender, nondistended, normoactive bowel sounds, no hernias, no hepatosplenomegaly,   Back: Deferred  Extremeties: warm, well-perfused, no tenderness or swelling, normal gait/station  Neuro: sensation and strength grossly intact and symmetrical  Psych: alert and oriented to person, place and time  Breast exam deferred    IMPRESSION  Hiatal hernia, asymptomatic, unchanged by imaging studies from 2013    PLAN  No orders of the defined types were placed in this encounter.     Follow-up as needed  José Miguel Lang MD

## 2018-08-15 NOTE — MR AVS SNAPSHOT
[facility] 
 
 
 [address] [facility] [address] [facility] [address] 
[phone] Patient: Azucena Jeans MRN: [id number] COJ:[id number] Visit Information Date & Time Provider Department Dept. Phone Encounter #  
 8/15/2018 10:30 AM Дмитрий Epstein MD [facility] [phone] [id number] Your Appointments 10/5/2018 11:30 AM  
Follow Up with Daniela Meehan, [address] [place] [address] ([address]) Appt Note: RT KNEE 3 mo fu; 8/9/18* All numbers on file for the patient have been called to try and get this appointment rescheduled as the provider will not be in the office at the time of their appointment, voicemail was left for the patient to return our call and reschedule* Home # is no longer in service, please update wit the patient; 8/13/18* All numbers on file for the patient have been called to try and get this appointment rescheduled as the provider will not be in the office at the [facility], [address] [facility]  
[phone] [address] [place] [address] [address] Upcoming Health Maintenance Date Due Hepatitis C Screening 1945 DTaP/Tdap/Td series (1 - Tdap) 7/7/1966 FOBT Q 1 YEAR AGE 50-75 7/7/1995 ZOSTER VACCINE AGE 60> 5/7/2005 GLAUCOMA SCREENING Q2Y 7/7/2010 Pneumococcal 65+ Low/Medium Risk (1 of 2 - PCV13) 7/7/2010 BREAST CANCER SCRN MAMMOGRAM 12/29/2017 MEDICARE YEARLY EXAM 7/6/2018 Influenza Age 5 to Adult 8/1/2018 Allergies as of 8/15/2018  Review Complete On: 8/15/2018 By: Charleston Crigler, LPN No Known Allergies Current Immunizations  Never Reviewed No immunizations on file. Not reviewed this visit Vitals BP Pulse Resp Height(growth percentile) Weight(growth percentile) SpO2  
 106/64 74 16 5' (1.524 m) 115 lb (52.2 kg) 98% BMI OB Status Smoking Status 22.46 kg/m2 Postmenopausal Never Smoker Vitals History BMI and BSA Data Body Mass Index Body Surface Area  
 22.46 kg/m 2 1.49 m 2 Preferred Pharmacy Pharmacy Name Phone [facility], [address] [phone] Your Updated Medication List  
  
   
This list is accurate as of 8/15/18  1:50 PM.  Always use your most recent med list.  
  
  
  
  
 ALEVE 220 mg Cap Generic drug:  naproxen sodium Take 2 Caplet by mouth nightly as needed (pain). aspirin 81 mg chewable tablet Take 3 Tabs by mouth two (2) times a day. cholecalciferol 1,000 unit tablet Commonly known as:  VITAMIN D3 Take 5,000 Units by mouth daily. MAGNESIUM CARBONATE PO Take 400 mg by mouth daily. multivitamin tablet Commonly known as:  ONE A DAY Take 1 Tab by mouth daily. Introducing [facility]! Maryana Merrill introduces Jammcard patient portal. Now you can access parts of your medical record, email your doctor's office, and request medication refills online. 1. In your internet browser, go to [email or web address] - [email or web address] - neutrinity 2. Click on the First Time User? Click Here link in the Sign In box. You will see the New Member Sign Up page. 3. Enter your Jammcard Access Code exactly as it appears below. You will not need to use this code after youve completed the sign-up process. If you do not sign up before the expiration date, you must request a new code. · Jammcard Access Code: [email or web address] Expires: 11/13/2018 10:31 AM 
 
4. Enter the last four digits of your Social Security Number (xxxx) and Date of Birth (mm/dd/yyyy) as indicated and click Submit. You will be taken to the next sign-up page. 5. Create a Jammcard ID. This will be your Jammcard login ID and cannot be changed, so think of one that is secure and easy to remember. 6. Create a GoMiles password. You can change your password at any time. 7. Enter your Password Reset Question and Answer. This can be used at a later time if you forget your password. 8. Enter your e-mail address. You will receive e-mail notification when new information is available in [address] 9. Click Sign Up. You can now view and download portions of your medical record. 10. Click the Download Summary menu link to download a portable copy of your medical information. If you have questions, please visit the Frequently Asked Questions section of the GoMiles website. Remember, GoMiles is NOT to be used for urgent needs. For medical emergencies, dial 911. Now available from your iPhone and Android! Please provide this summary of care documentation to your next provider. Your primary care clinician is listed as Angelita Driver. If you have any questions after today's visit, please call [phone].

## 2018-10-19 ENCOUNTER — OFFICE VISIT (OUTPATIENT)
Dept: ORTHOPEDIC SURGERY | Age: 73
End: 2018-10-19

## 2018-10-19 VITALS
TEMPERATURE: 96.5 F | RESPIRATION RATE: 16 BRPM | WEIGHT: 114.8 LBS | OXYGEN SATURATION: 100 % | BODY MASS INDEX: 22.54 KG/M2 | SYSTOLIC BLOOD PRESSURE: 139 MMHG | DIASTOLIC BLOOD PRESSURE: 78 MMHG | HEIGHT: 60 IN | HEART RATE: 73 BPM

## 2018-10-19 DIAGNOSIS — Z96.651 STATUS POST TOTAL RIGHT KNEE REPLACEMENT: Primary | ICD-10-CM

## 2018-10-19 RX ORDER — DICLOFENAC SODIUM 10 MG/G
2 GEL TOPICAL 4 TIMES DAILY
Qty: 100 G | Refills: 2 | Status: SHIPPED | OUTPATIENT
Start: 2018-10-19 | End: 2019-09-05

## 2018-10-19 NOTE — PROGRESS NOTES
Patient: Nohemy Morse  YOB: 1945       HISTORY:  The patient presents for reevaluation of her s/p right total knee arthroplasty on 4/17/18. Patient is improved, states pain is a 0 out of 10.  she has participated in outpatient physical therapy and has finished at this point. has been doing knee exercises at home. She still has the occasional right knee and calf pain at night. Ice and heat do help. Neg Doppler study 5/25/18. Calf not tender on exam. She has known Scoliosis. Patient denies any fever, chills, chest pain, shortness of breath. There are no new illness or injuries to report since last seen in the office. PHYSICAL EXAMINATION:    Visit Vitals  /78   Pulse 73   Temp 96.5 °F (35.8 °C) (Oral)   Resp 16   Ht 5' (1.524 m)   Wt 114 lb 12.8 oz (52.1 kg)   SpO2 100%   BMI 22.42 kg/m²     The patient is a well-developed, well-nourished female in no acute distress. The patient is alert and oriented times three. The patient appears to be well groomed. Mood and affect are normal.   ORTHOPEDIC EXAM of Right knee: Inspection: Effusion not present,  Incision well healed, neg swelling to calf tenderness on exam  TTP: none  Range of motion: -2-120 flexion  Stability: Stable   Strength: 5/5  2+ distal pulses  NV intact      IMPRESSION:  Status post Right total knee arthroplasty. PLAN:  Pt doing well post operatively - still having the pain at night. She will continue to ice and she will try volteran gel to help with the discomfort  Patient is weight bearing as tolerated. Patient not given refill of pain medication. Patient will follow up in 6 months.      [facility]

## 2018-11-21 ENCOUNTER — OFFICE VISIT (OUTPATIENT)
Dept: ORTHOPEDIC SURGERY | Facility: CLINIC | Age: 73
End: 2018-11-21

## 2018-11-21 VITALS
RESPIRATION RATE: 18 BRPM | HEIGHT: 60 IN | HEART RATE: 71 BPM | WEIGHT: 121.6 LBS | SYSTOLIC BLOOD PRESSURE: 121 MMHG | TEMPERATURE: 96.3 F | OXYGEN SATURATION: 99 % | DIASTOLIC BLOOD PRESSURE: 65 MMHG | BODY MASS INDEX: 23.87 KG/M2

## 2018-11-21 DIAGNOSIS — Z96.651 STATUS POST TOTAL KNEE REPLACEMENT, RIGHT: Primary | ICD-10-CM

## 2018-11-21 DIAGNOSIS — M17.12 PRIMARY OSTEOARTHRITIS OF LEFT KNEE: ICD-10-CM

## 2018-11-21 RX ORDER — CELECOXIB 200 MG/1
200 CAPSULE ORAL DAILY
Qty: 30 CAP | Refills: 2 | Status: SHIPPED | OUTPATIENT
Start: 2018-11-21 | End: 2019-02-19

## 2018-11-21 NOTE — PROGRESS NOTES
Patient: Nicole Holland                MRN: [id number]       SSN: [id number] YOB: 1945        AGE: 68 y.o. SEX: female Body mass index is 23.75 kg/m². PCP: Rivera Johnson MD 
11/21/18 HISTORY: I had the pleasure of reviewing Ms. Jordan Mike today. She is just approaching five to six months following total knee replacement. Nothing specifically is problematic, although she is having a little more pain than she would like. She has some pain at night. She is actually fairly active. She actually walked 40 minutes yesterday and did fairly well with it. She has noticed a little bit of stiffness within the knee and a slight amount of swelling as well. She is very slim. She has known back problems. The left knee has known severe arthritis. She denies fevers or chills. She states that the pain overall has improved gradually with time, and she had some concerns that she wanted to address today. PHYSICAL EXAMINATION:  On examination today, she is very pleasant. She holds her back fairly stiffly. She has scoliosis and also some kyphosis as well involving her low back. The left knee is in varus. The right knee has neutral alignment when she ambulates. I do notice a fixed flexion deformity on the knee. Examination of the low back reveals mild tenderness and scoliosis. The hips rotate adequately. The knee itself has a nicely healed incision. There is just a slight effusion. It is not hot or red. It is just slightly warm compared to the other side. The stability is excellent. Her bending is excellent. She is bending to 125°. She has about -6° of extension, and I think that is one of the issues currently. Her left knee has a fixed flexion deformity of about 8° or so. RADIOGRAPHS:  X-rays confirm that the knee replacement is wonderfully aligned and well-fixed.   
 
IMPRESSION:  My overall impression is some arthrofibrosis and fixed flexion deformity status post total knee replacement. PLAN:  She has not been sitting with the knee out straight, and I have reminded her to sit with the knee out straight at least five times a day. I am going to get some infectious labs to rule out infection, which I expect to be negative. I am wondering if a little more therapy might be helpful. I think just sitting with the leg out straight would be helpful as well. She has made good progress, and hopefully, we will get things to settle down further. I am going to try her with a short course of Celebrex. We could consider an intraarticular injection for the knee, but I suspect the anti-inflammatory medications and some therapy will be helpful. We will hold off on the therapy just for the time being. I suspect that she will not be requiring any further surgery and that things should calm down over the next few months. We will follow along very closely. It was a pleasure to speak with you the other day. I look forward to working with you in the near future. REVIEW OF SYSTEMS:   
 
CON: negative for weight loss, fever EYE: negative for double vision ENT: negative for hoarseness RS:   negative for Tb 
GI:    negative for blood in stool :  negative for blood in urine Other systems reviewed and noted below. Past Medical History:  
Diagnosis Date  Hypertension Family History Problem Relation Age of Onset  Heart Attack Mother 79  
 Heart Attack Father 79  
 Other Brother 48 Mesothelioma Current Outpatient Medications Medication Sig Dispense Refill  celecoxib (CELEBREX) 200 mg capsule Take 1 Cap by mouth daily for 90 days. Take with food. 30 Cap 2  
 diclofenac (VOLTAREN) 1 % gel Apply 2 g to affected area four (4) times daily. 100 g 2  cholecalciferol (VITAMIN D3) 1,000 unit tablet Take 5,000 Units by mouth daily.  multivitamin (ONE A DAY) tablet Take 1 Tab by mouth daily.  MAGNESIUM CARBONATE PO Take 400 mg by mouth daily.  aspirin 81 mg chewable tablet Take 3 Tabs by mouth two (2) times a day. 60 Tab 0 No Known Allergies Past Surgical History:  
Procedure Laterality Date  HX HERNIA REPAIR    
 together with tummy tuck  HX HYSTERECTOMY  HX KNEE REPLACEMENT Right 04/17/2018 Social History Socioeconomic History  Marital status:  Spouse name: Not on file  Number of children: Not on file  Years of education: Not on file  Highest education level: Not on file Social Needs  Financial resource strain: Not on file  Food insecurity - worry: Not on file  Food insecurity - inability: Not on file  Transportation needs - medical: Not on file  Transportation needs - non-medical: Not on file Occupational History  Not on file Tobacco Use  Smoking status: Never Smoker  Smokeless tobacco: Never Used Substance and Sexual Activity  Alcohol use: Yes Alcohol/week: 0.5 oz Types: 1 Glasses of wine per week Comment: occasionally  Drug use: No  
 Sexual activity: Yes  
  Partners: Male Other Topics Concern  Not on file Social History Narrative  Not on file Visit Vitals /65 Pulse 71 Temp 96.3 °F (35.7 °C) (Oral) Resp 18 Ht 5' (1.524 m) Wt 121 lb 9.6 oz (55.2 kg) SpO2 99% BMI 23.75 kg/m² PHYSICAL EXAMINATION: 
GENERAL: Alert and oriented x3, in no acute distress, well-developed, well-nourished, afebrile. HEART: No JVD. EYES: No scleral icterus NECK: No significant lymphadenopathy LUNGS: No respiratory compromise or indrawing ABDOMEN: Soft, non-tender, non-distended. Electronically signed by:  Chago Payton MD 
 
 Bhupinder Sifuentes), Cardiovascular Disease; Internal Medicine  [address] [place] [address]  Phone: [phone]  Fax: [phone]

## 2018-11-23 ENCOUNTER — HOSPITAL ENCOUNTER (OUTPATIENT)
Dept: LAB | Age: 73
Discharge: HOME | End: 2018-11-23
Payer: MEDICARE

## 2018-11-23 DIAGNOSIS — Z96.651 STATUS POST TOTAL KNEE REPLACEMENT, RIGHT: ICD-10-CM

## 2018-11-23 LAB
BASOPHILS # BLD: 0 K/UL (ref 0–0.1)
BASOPHILS NFR BLD: 1 % (ref 0–2)
CRP SERPL-MCNC: <0.3 MG/DL (ref 0–0.3)
DIFFERENTIAL METHOD BLD: ABNORMAL
EOSINOPHIL # BLD: 0.1 K/UL (ref 0–0.4)
EOSINOPHIL NFR BLD: 1 % (ref 0–5)
ERYTHROCYTE [DISTWIDTH] IN BLOOD BY AUTOMATED COUNT: 12.6 % (ref 11.6–14.5)
ERYTHROCYTE [SEDIMENTATION RATE] IN BLOOD: 2 MM/HR (ref 0–30)
HCT VFR BLD AUTO: 42 % (ref 35–45)
HGB BLD-MCNC: 14.3 G/DL (ref 12–16)
LYMPHOCYTES # BLD: 1.6 K/UL (ref 0.9–3.6)
LYMPHOCYTES NFR BLD: 26 % (ref 21–52)
MCH RBC QN AUTO: 28.6 PG (ref 24–34)
MCHC RBC AUTO-ENTMCNC: 34 G/DL (ref 31–37)
MCV RBC AUTO: 84 FL (ref 74–97)
MONOCYTES # BLD: 0.7 K/UL (ref 0.05–1.2)
MONOCYTES NFR BLD: 12 % (ref 3–10)
NEUTS SEG # BLD: 3.9 K/UL (ref 1.8–8)
NEUTS SEG NFR BLD: 60 % (ref 40–73)
PLATELET # BLD AUTO: 285 K/UL (ref 135–420)
PMV BLD AUTO: 9.2 FL (ref 9.2–11.8)
RBC # BLD AUTO: 5 M/UL (ref 4.2–5.3)
URATE SERPL-MCNC: 3.6 MG/DL (ref 2.6–7.2)
WBC # BLD AUTO: 6.4 K/UL (ref 4.6–13.2)

## 2018-11-23 PROCEDURE — 85025 COMPLETE CBC W/AUTO DIFF WBC: CPT

## 2018-11-23 PROCEDURE — 83520 IMMUNOASSAY QUANT NOS NONAB: CPT

## 2018-11-23 PROCEDURE — 85652 RBC SED RATE AUTOMATED: CPT

## 2018-11-23 PROCEDURE — 86140 C-REACTIVE PROTEIN: CPT

## 2018-11-23 PROCEDURE — 84550 ASSAY OF BLOOD/URIC ACID: CPT

## 2018-11-23 PROCEDURE — 36415 COLL VENOUS BLD VENIPUNCTURE: CPT

## 2018-11-27 LAB — IL6 SERPL-MCNC: <0.7 PG/ML (ref 0–15.5)

## 2018-12-07 ENCOUNTER — OFFICE VISIT (OUTPATIENT)
Dept: ORTHOPEDIC SURGERY | Age: 73
End: 2018-12-07

## 2018-12-07 VITALS
HEART RATE: 77 BPM | SYSTOLIC BLOOD PRESSURE: 123 MMHG | OXYGEN SATURATION: 100 % | DIASTOLIC BLOOD PRESSURE: 80 MMHG | BODY MASS INDEX: 23.95 KG/M2 | HEIGHT: 60 IN | RESPIRATION RATE: 16 BRPM | WEIGHT: 122 LBS

## 2018-12-07 DIAGNOSIS — M17.12 PRIMARY OSTEOARTHRITIS OF LEFT KNEE: ICD-10-CM

## 2018-12-07 DIAGNOSIS — Z96.651 STATUS POST TOTAL KNEE REPLACEMENT, RIGHT: Primary | ICD-10-CM

## 2018-12-07 RX ORDER — CELECOXIB 200 MG/1
200 CAPSULE ORAL DAILY
Qty: 30 CAP | Refills: 2 | Status: SHIPPED | OUTPATIENT
Start: 2018-12-07 | End: 2019-03-07

## 2018-12-07 NOTE — PROGRESS NOTES
1. Have you been to the ER, urgent care clinic since your last visit? Hospitalized since your last visit? No 
 
2. Have you seen or consulted any other health care providers outside of the [address] [place] [address] since your last visit? Include any pap smears or colon screening.  No

## 2018-12-07 NOTE — PROGRESS NOTES
Patient: Celeste Isaacs                MRN: [id number]       SSN: [id number] YOB: 1945        AGE: 68 y.o. SEX: female Body mass index is 23.83 kg/m². PCP: Ravi Fried MD 
12/07/18 HISTORY: I had the pleasure of reviewing Juanita Nolasco. I put Juanita Nolasco on some Celebrex and did some infectious labs as well. She has done very nicely with this. She has been sitting with the leg out straight a little bit more as well and doing some more therapy at home. I am pleased to report she is reporting very little pain. She denies fevers or chills. PHYSICAL EXAMINATION:  On exam of the knee itself, she is within two to three degrees of full extension. I am very pleased. She is bending to about 105°. The calf is nontender. Elisa's sign is negative. She is a little bit kyphotic on exam.  She is otherwise well-nourished and well-developed. There is no evidence for infection or DVT. There is no effusion in the knee. The knee itself is quite stable. The hips are noncontributory. The back is minimally tender today. RADIOGRAPHS:  Review of her x-rays confirm the knee placement is in excellent position and alignment. IMPRESSION:  My overall impression is knee replacement really doing quite nicely. PLAN:  I think the Celebrex has helped. I would like her to stay on it for another week or two and then just as needed. We will follow her up in about eight weeks time, but I am happy to report, I think she has really turned a corner, and she is doing quite well. She has a half an hour walking tolerance on a daily basis, which is terrific. REVIEW OF SYSTEMS:   
 
CON: negative for weight loss, fever EYE: negative for double vision ENT: negative for hoarseness RS:   negative for Tb 
GI:    negative for blood in stool :  negative for blood in urine Other systems reviewed and noted below. Past Medical History:  
Diagnosis Date  Hypertension Family History Problem Relation Age of Onset  Heart Attack Mother 79  
 Heart Attack Father 79  
 Other Brother 48 Mesothelioma Current Outpatient Medications Medication Sig Dispense Refill  celecoxib (CELEBREX) 200 mg capsule Take 1 Cap by mouth daily for 90 days. Take with food. 30 Cap 2  
 diclofenac (VOLTAREN) 1 % gel Apply 2 g to affected area four (4) times daily. 100 g 2  cholecalciferol (VITAMIN D3) 1,000 unit tablet Take 5,000 Units by mouth daily.  aspirin 81 mg chewable tablet Take 3 Tabs by mouth two (2) times a day. 60 Tab 0  
 multivitamin (ONE A DAY) tablet Take 1 Tab by mouth daily.  MAGNESIUM CARBONATE PO Take 400 mg by mouth daily. No Known Allergies Past Surgical History:  
Procedure Laterality Date  HX HERNIA REPAIR    
 together with tummy tuck  HX HYSTERECTOMY  HX KNEE REPLACEMENT Right 04/17/2018 Social History Socioeconomic History  Marital status:  Spouse name: Not on file  Number of children: Not on file  Years of education: Not on file  Highest education level: Not on file Social Needs  Financial resource strain: Not on file  Food insecurity - worry: Not on file  Food insecurity - inability: Not on file  Transportation needs - medical: Not on file  Transportation needs - non-medical: Not on file Occupational History  Not on file Tobacco Use  Smoking status: Never Smoker  Smokeless tobacco: Never Used Substance and Sexual Activity  Alcohol use: Yes Alcohol/week: 0.5 oz Types: 1 Glasses of wine per week Comment: occasionally  Drug use: No  
 Sexual activity: Yes  
  Partners: Male Other Topics Concern  Not on file Social History Narrative  Not on file Visit Vitals /80 Pulse 77 Resp 16 Ht 5' (1.524 m) Wt 122 lb (55.3 kg) SpO2 100% BMI 23.83 kg/m² PHYSICAL EXAMINATION: 
 GENERAL: Alert and oriented x3, in no acute distress, well-developed, well-nourished, afebrile. HEART: No JVD. EYES: No scleral icterus NECK: No significant lymphadenopathy LUNGS: No respiratory compromise or indrawing ABDOMEN: Soft, non-tender, non-distended. Electronically signed by:  Kenneth Freitas MD

## 2019-02-01 ENCOUNTER — OFFICE VISIT (OUTPATIENT)
Dept: ORTHOPEDIC SURGERY | Age: 74
End: 2019-02-01

## 2019-02-01 VITALS
SYSTOLIC BLOOD PRESSURE: 124 MMHG | DIASTOLIC BLOOD PRESSURE: 72 MMHG | BODY MASS INDEX: 23.99 KG/M2 | HEIGHT: 60 IN | RESPIRATION RATE: 14 BRPM | OXYGEN SATURATION: 99 % | HEART RATE: 71 BPM | WEIGHT: 122.2 LBS | TEMPERATURE: 96.8 F

## 2019-02-01 DIAGNOSIS — Z96.651 STATUS POST TOTAL KNEE REPLACEMENT, RIGHT: Primary | ICD-10-CM

## 2019-02-01 NOTE — PROGRESS NOTES
1. Have you been to the ER, urgent care clinic since your last visit? Hospitalized since your last visit? No 
 
2. Have you seen or consulted any other health care providers outside of the [address] [place] [address] since your last visit? Include any pap smears or colon screening.  No

## 2019-02-01 NOTE — PROGRESS NOTES
Patient: Pascual Lopez                MRN: [id number]       SSN: [id number] YOB: 1945        AGE: 68 y.o. SEX: female Body mass index is 23.87 kg/m². PCP: Mason Plummer MD 
02/01/19 HISTORY:  I had the pleasure of reviewing Raghu Rosenbaum. We worked her up for infection, and it was negative. The anti-inflammatory medications and a little bit more healing time has really done well for her. She is very happy with the knee. In fact, I got a hug today. She is doing so well. The  is assisted. She had a little bit of a fixed flexion deformity of the knee secondary to noncompliance and some scar tissue. She has worked most of that out. PHYSICAL EXAMINATION:  On examination today, she is walking well. She is happy. She is a little kyphotic. The hips rotate adequately. Both feet are warm and well perfused. The incision is nicely healed. She has a negligible effusion. It is not hot or red. She is within one or two degrees of full extension. I am very pleased with this, and flexion is about 105-108°. RADIOGRAPHS:  X-rays look terrific. PLAN:  She is doing extremely well at this point. It turns out she just needed a little bit more time to heal.  She has, in fact, reached the finish line, and she has done wonderfully. We will see her back in three months for followup assessment. cc: Saint Loving, M.D. REVIEW OF SYSTEMS:   
 
CON: negative for weight loss, fever EYE: negative for double vision ENT: negative for hoarseness RS:   negative for Tb 
GI:    negative for blood in stool :  negative for blood in urine Other systems reviewed and noted below. Past Medical History:  
Diagnosis Date  Hypertension Family History Problem Relation Age of Onset  Heart Attack Mother 79  
 Heart Attack Father 79  
 Other Brother 48 Mesothelioma Current Outpatient Medications Medication Sig Dispense Refill  celecoxib (CELEBREX) 200 mg capsule Take 1 Cap by mouth daily for 90 days. Take with food. 30 Cap 2  celecoxib (CELEBREX) 200 mg capsule Take 1 Cap by mouth daily for 90 days. Take with food. 30 Cap 2  
 diclofenac (VOLTAREN) 1 % gel Apply 2 g to affected area four (4) times daily. 100 g 2  cholecalciferol (VITAMIN D3) 1,000 unit tablet Take 5,000 Units by mouth daily.  aspirin 81 mg chewable tablet Take 3 Tabs by mouth two (2) times a day. 60 Tab 0  
 multivitamin (ONE A DAY) tablet Take 1 Tab by mouth daily.  MAGNESIUM CARBONATE PO Take 400 mg by mouth daily. No Known Allergies Past Surgical History:  
Procedure Laterality Date  HX HERNIA REPAIR    
 together with tummy tuck  HX HYSTERECTOMY  HX KNEE REPLACEMENT Right 04/17/2018 Social History Socioeconomic History  Marital status:  Spouse name: Not on file  Number of children: Not on file  Years of education: Not on file  Highest education level: Not on file Social Needs  Financial resource strain: Not on file  Food insecurity - worry: Not on file  Food insecurity - inability: Not on file  Transportation needs - medical: Not on file  Transportation needs - non-medical: Not on file Occupational History  Not on file Tobacco Use  Smoking status: Never Smoker  Smokeless tobacco: Never Used Substance and Sexual Activity  Alcohol use: Yes Alcohol/week: 0.5 oz Types: 1 Glasses of wine per week Comment: occasionally  Drug use: No  
 Sexual activity: Yes  
  Partners: Male Other Topics Concern  Not on file Social History Narrative  Not on file Visit Vitals /72 Pulse 71 Temp 96.8 °F (36 °C) (Oral) Resp 14 Ht 5' (1.524 m) Wt 122 lb 3.2 oz (55.4 kg) SpO2 99% BMI 23.87 kg/m² PHYSICAL EXAMINATION: 
GENERAL: Alert and oriented x3, in no acute distress, well-developed, well-nourished, afebrile. HEART: No JVD. EYES: No scleral icterus NECK: No significant lymphadenopathy LUNGS: No respiratory compromise or indrawing ABDOMEN: Soft, non-tender, non-distended. Electronically signed by:  Julia Powell MD

## 2019-05-10 ENCOUNTER — OFFICE VISIT (OUTPATIENT)
Dept: ORTHOPEDIC SURGERY | Age: 74
End: 2019-05-10

## 2019-05-10 VITALS
HEIGHT: 60 IN | OXYGEN SATURATION: 100 % | HEART RATE: 79 BPM | BODY MASS INDEX: 23.95 KG/M2 | WEIGHT: 122 LBS | SYSTOLIC BLOOD PRESSURE: 139 MMHG | TEMPERATURE: 96.9 F | DIASTOLIC BLOOD PRESSURE: 71 MMHG | RESPIRATION RATE: 18 BRPM

## 2019-05-10 DIAGNOSIS — M17.12 PRIMARY OSTEOARTHRITIS OF LEFT KNEE: ICD-10-CM

## 2019-05-10 DIAGNOSIS — Z96.651 HISTORY OF TOTAL RIGHT KNEE REPLACEMENT: Primary | ICD-10-CM

## 2019-05-10 NOTE — PROGRESS NOTES
[address]  [phone]           Patient: Taisha Meyer                MRN: [id number]       SSN: [id number]  YOB: 1945        AGE: 68 y.o. SEX: female  Body mass index is 23.83 kg/m². PCP: Kami Delong MD  05/10/19      This office note has been dictated. REVIEW OF SYSTEMS:  Constitutional: Negative for fever, chills, weight loss and malaise/fatigue. HENT: Negative. Eyes: Negative. Respiratory: Negative. Cardiovascular: Negative. Gastrointestinal: No bowel incontinence or constipation. Genitourinary: No bladder incontinence or saddle anesthesia. Skin: Negative. Neurological: Negative. Endo/Heme/Allergies: Negative. Psychiatric/Behavioral: Negative. Musculoskeletal: As per HPI above. Past Medical History:   Diagnosis Date    Hypertension          Current Outpatient Medications:     diclofenac (VOLTAREN) 1 % gel, Apply 2 g to affected area four (4) times daily. , Disp: 100 g, Rfl: 2    cholecalciferol (VITAMIN D3) 1,000 unit tablet, Take 5,000 Units by mouth daily. , Disp: , Rfl:     aspirin 81 mg chewable tablet, Take 3 Tabs by mouth two (2) times a day., Disp: 60 Tab, Rfl: 0    multivitamin (ONE A DAY) tablet, Take 1 Tab by mouth daily. , Disp: , Rfl:     MAGNESIUM CARBONATE PO, Take 400 mg by mouth daily. , Disp: , Rfl:     No Known Allergies    Social History     Socioeconomic History    Marital status:      Spouse name: Not on file    Number of children: Not on file    Years of education: Not on file    Highest education level: Not on file   Occupational History    Not on file   Social Needs    Financial resource strain: Not on file    Food insecurity:     Worry: Not on file     Inability: Not on file    Transportation needs:     Medical: Not on file     Non-medical: Not on file   Tobacco Use    Smoking status: Never Smoker    Smokeless tobacco: Never Used   Substance and Sexual Activity    Alcohol use: Yes     Alcohol/week: 0.5 oz     Types: 1 Glasses of wine per week     Comment: occasionally    Drug use: No    Sexual activity: Yes     Partners: Male   Lifestyle    Physical activity:     Days per week: Not on file     Minutes per session: Not on file    Stress: Not on file   Relationships    Social connections:     Talks on phone: Not on file     Gets together: Not on file     Attends Nondenominational service: Not on file     Active member of club or organization: Not on file     Attends meetings of clubs or organizations: Not on file     Relationship status: Not on file    Intimate partner violence:     Fear of current or ex partner: Not on file     Emotionally abused: Not on file     Physically abused: Not on file     Forced sexual activity: Not on file   Other Topics Concern    Not on file   Social History Narrative    Not on file       Past Surgical History:   Procedure Laterality Date    HX HERNIA REPAIR      together with tummy tuck     HX HYSTERECTOMY      HX KNEE REPLACEMENT Right 04/17/2018           We did see Ms. Duran Carnes for followup with regards to her bilateral knees. She is status post right knee replacement and is now a year out. She is doing very well with the knees. She is very happy with the results of the knee replacement. She does have known advanced arthritis of the left knee. She has had no treatment for the left knee to date. She is able to tolerate it. She walks about one hour a day. There have been no recent fevers, chills, systemic changes, or injuries to report, and no chest pain or shortness of breath. PHYSICAL EXAMINATION:  In general, the patient is alert and oriented x 3 in no acute distress. The patient is well-developed, well-nourished, with a normal affect. The patient is afebrile. HEENT:  Head is normocephalic and atraumatic. Pupils are equally round and reactive to light and accommodation. Extraocular eye movements are intact. Neck is supple. Trachea is midline. No JVD is present. Breathing is nonlabored. Examination of the lower extremities reveals pain-free range of motion of the hips. There is no pain to palpation of the greater trochanteric bursae. There is negative straight leg raise. There is negative calf tenderness. There is negative Elisa's. There is no evidence of DVT present. The right knee reveals the skin is intact. The surgical wounds are healed nicely. There is no ecchymosis, no warmth, and no signs of infection or cellulitis present. Range of motion:  She is missing about two or three degrees on extension. She flexes to 115ø. The patella tracks nicely. There are no rubs or crepitus noted. Stability is quite good. The left knee reveals the skin is intact. There is no ecchymosis, no warmth, and no signs of infection or cellulitis present. She does have some discomfort to palpation to the medial joint line and some discomfort with patellofemoral grind. RADIOGRAPHS:  Review of previous radiographs show the total knee components are well-fixed without evidence of loosening or fracture noted. The left knee does confirm advanced arthritis with bone-to-bone eburnation to the medial compartment. ASSESSMENT:      1. Status post right knee replacement doing well. 2. Left knee end-staged arthritis. PLAN:  At this point, we discussed treatment options. She will continue activities as tolerated. She is doing quite well. We will have her follow up with us in one years' time for evaluation and x-rays of the knees. If the left one does act up, she does understand we can do a cortisone injection for her if she would like.                     JR Blaine MARQUEZ, PAJayaC, ATC

## 2019-05-10 NOTE — PROGRESS NOTES
1. Have you been to the ER, urgent care clinic since your last visit? Hospitalized since your last visit? No    2. Have you seen or consulted any other health care providers outside of the [address] [place] [address] since your last visit? Include any pap smears or colon screening.  No

## 2019-07-08 RX ORDER — AMOXICILLIN 500 MG/1
CAPSULE ORAL
Qty: 4 CAP | Refills: 3 | Status: SHIPPED | OUTPATIENT
Start: 2019-07-08 | End: 2020-01-12

## 2019-07-08 NOTE — TELEPHONE ENCOUNTER
Patient called saying she forgot she had a dentist appointment today at 10 am and does not have her antibiotic she is supposed to take. She asked if there is a way to get it sent over to [address] [place] [address], [address] [place] [address] before her appointment. Please advise patient at [phone] when completed.

## 2019-09-05 RX ORDER — CHOLECALCIFEROL (VITAMIN D3) 125 MCG
CAPSULE ORAL
COMMUNITY
End: 2020-01-12

## 2019-09-09 ENCOUNTER — ANESTHESIA EVENT (OUTPATIENT)
Dept: ENDOSCOPY | Age: 74
End: 2019-09-09
Payer: MEDICARE

## 2019-09-09 RX ORDER — SODIUM CHLORIDE, SODIUM LACTATE, POTASSIUM CHLORIDE, CALCIUM CHLORIDE 600; 310; 30; 20 MG/100ML; MG/100ML; MG/100ML; MG/100ML
75 INJECTION, SOLUTION INTRAVENOUS CONTINUOUS
Status: CANCELLED | OUTPATIENT
Start: 2019-09-09 | End: 2019-09-10

## 2019-09-09 RX ORDER — SODIUM CHLORIDE 0.9 % (FLUSH) 0.9 %
5-40 SYRINGE (ML) INJECTION AS NEEDED
Status: CANCELLED | OUTPATIENT
Start: 2019-09-09

## 2019-09-09 RX ORDER — SODIUM CHLORIDE 0.9 % (FLUSH) 0.9 %
5-40 SYRINGE (ML) INJECTION EVERY 8 HOURS
Status: CANCELLED | OUTPATIENT
Start: 2019-09-09

## 2019-09-09 RX ORDER — LIDOCAINE HYDROCHLORIDE 10 MG/ML
0.1 INJECTION, SOLUTION EPIDURAL; INFILTRATION; INTRACAUDAL; PERINEURAL AS NEEDED
Status: CANCELLED | OUTPATIENT
Start: 2019-09-09

## 2019-09-09 RX ORDER — INSULIN LISPRO 100 [IU]/ML
INJECTION, SOLUTION INTRAVENOUS; SUBCUTANEOUS ONCE
Status: CANCELLED | OUTPATIENT
Start: 2019-09-09 | End: 2019-09-09

## 2019-09-10 ENCOUNTER — HOSPITAL ENCOUNTER (OUTPATIENT)
Age: 74
Setting detail: OUTPATIENT SURGERY
Discharge: HOME | End: 2019-09-10
Attending: INTERNAL MEDICINE | Admitting: INTERNAL MEDICINE
Payer: MEDICARE

## 2019-09-10 ENCOUNTER — ANESTHESIA (OUTPATIENT)
Dept: ENDOSCOPY | Age: 74
End: 2019-09-10
Payer: MEDICARE

## 2019-09-10 VITALS
TEMPERATURE: 98.2 F | WEIGHT: 120 LBS | DIASTOLIC BLOOD PRESSURE: 56 MMHG | HEIGHT: 60 IN | BODY MASS INDEX: 23.56 KG/M2 | SYSTOLIC BLOOD PRESSURE: 118 MMHG | HEART RATE: 59 BPM | RESPIRATION RATE: 15 BRPM | OXYGEN SATURATION: 100 %

## 2019-09-10 PROCEDURE — 76060000032 HC ANESTHESIA 0.5 TO 1 HR: Performed by: INTERNAL MEDICINE

## 2019-09-10 PROCEDURE — 76040000007: Performed by: INTERNAL MEDICINE

## 2019-09-10 PROCEDURE — 74011000250 HC RX REV CODE- 250

## 2019-09-10 PROCEDURE — 74011250636 HC RX REV CODE- 250/636

## 2019-09-10 RX ORDER — LIDOCAINE HYDROCHLORIDE 20 MG/ML
INJECTION, SOLUTION EPIDURAL; INFILTRATION; INTRACAUDAL; PERINEURAL AS NEEDED
Status: DISCONTINUED | OUTPATIENT
Start: 2019-09-10 | End: 2019-09-10 | Stop reason: HOSPADM

## 2019-09-10 RX ORDER — DEXTROMETHORPHAN/PSEUDOEPHED 2.5-7.5/.8
1.2 DROPS ORAL
Status: CANCELLED | OUTPATIENT
Start: 2019-09-10

## 2019-09-10 RX ORDER — PROPOFOL 10 MG/ML
INJECTION, EMULSION INTRAVENOUS AS NEEDED
Status: DISCONTINUED | OUTPATIENT
Start: 2019-09-10 | End: 2019-09-10 | Stop reason: HOSPADM

## 2019-09-10 RX ORDER — ATROPINE SULFATE 0.1 MG/ML
0.5 INJECTION INTRAVENOUS
Status: CANCELLED | OUTPATIENT
Start: 2019-09-10 | End: 2019-09-11

## 2019-09-10 RX ORDER — EPINEPHRINE 0.1 MG/ML
1 INJECTION INTRACARDIAC; INTRAVENOUS
Status: CANCELLED | OUTPATIENT
Start: 2019-09-10 | End: 2019-09-11

## 2019-09-10 RX ORDER — NALOXONE HYDROCHLORIDE 0.4 MG/ML
0.4 INJECTION, SOLUTION INTRAMUSCULAR; INTRAVENOUS; SUBCUTANEOUS
Status: CANCELLED | OUTPATIENT
Start: 2019-09-10 | End: 2019-09-10

## 2019-09-10 RX ORDER — FLUMAZENIL 0.1 MG/ML
0.2 INJECTION INTRAVENOUS
Status: CANCELLED | OUTPATIENT
Start: 2019-09-10 | End: 2019-09-10

## 2019-09-10 RX ORDER — SODIUM CHLORIDE, SODIUM LACTATE, POTASSIUM CHLORIDE, CALCIUM CHLORIDE 600; 310; 30; 20 MG/100ML; MG/100ML; MG/100ML; MG/100ML
INJECTION, SOLUTION INTRAVENOUS
Status: DISCONTINUED | OUTPATIENT
Start: 2019-09-10 | End: 2019-09-10 | Stop reason: HOSPADM

## 2019-09-10 RX ORDER — SODIUM CHLORIDE 0.9 % (FLUSH) 0.9 %
5-40 SYRINGE (ML) INJECTION AS NEEDED
Status: CANCELLED | OUTPATIENT
Start: 2019-09-10

## 2019-09-10 RX ORDER — SODIUM CHLORIDE 0.9 % (FLUSH) 0.9 %
5-40 SYRINGE (ML) INJECTION EVERY 8 HOURS
Status: CANCELLED | OUTPATIENT
Start: 2019-09-10

## 2019-09-10 RX ADMIN — PROPOFOL 60 MG: 10 INJECTION, EMULSION INTRAVENOUS at 07:39

## 2019-09-10 RX ADMIN — PROPOFOL 30 MG: 10 INJECTION, EMULSION INTRAVENOUS at 07:50

## 2019-09-10 RX ADMIN — PROPOFOL 30 MG: 10 INJECTION, EMULSION INTRAVENOUS at 07:45

## 2019-09-10 RX ADMIN — PROPOFOL 30 MG: 10 INJECTION, EMULSION INTRAVENOUS at 07:42

## 2019-09-10 RX ADMIN — SODIUM CHLORIDE, SODIUM LACTATE, POTASSIUM CHLORIDE, CALCIUM CHLORIDE: 600; 310; 30; 20 INJECTION, SOLUTION INTRAVENOUS at 07:32

## 2019-09-10 RX ADMIN — LIDOCAINE HYDROCHLORIDE 50 MG: 20 INJECTION, SOLUTION EPIDURAL; INFILTRATION; INTRACAUDAL; PERINEURAL at 07:38

## 2019-09-10 NOTE — H&P
History and Physical reviewed; I have examined the patient and there are no pertinent changes. Shakir Tabor MD, MD   7:16 AM 9/10/2019  [facility], [address] [place] [address]  [email or web address]

## 2019-09-10 NOTE — ANESTHESIA PREPROCEDURE EVALUATION
Anesthetic History   No history of anesthetic complications            Review of Systems / Medical History  Patient summary reviewed and pertinent labs reviewed    Pulmonary  Within defined limits                 Neuro/Psych   Within defined limits           Cardiovascular      Valvular problems/murmurs: tricuspid insufficiency            Exercise tolerance: >4 METS  Comments: 04/2018 ECHO  Ejection fraction was estimated to  be 65 %. Tricuspid valve There was mild to moderate regurgitation.    GI/Hepatic/Renal  Within defined limits              Endo/Other        Arthritis     Other Findings              Physical Exam    Airway  Mallampati: III  TM Distance: 4 - 6 cm  Neck ROM: normal range of motion   Mouth opening: Normal     Cardiovascular    Rhythm: regular           Dental  No notable dental hx       Pulmonary  Breath sounds clear to auscultation               Abdominal  GI exam deferred       Other Findings            Anesthetic Plan    ASA: 2  Anesthesia type: MAC - femoral single shot          Induction: Intravenous  Anesthetic plan and risks discussed with: Patient

## 2019-09-10 NOTE — DISCHARGE INSTRUCTIONS
Colonoscopy: What to Expect at [address] [place] [address]  After you have a colonoscopy, you will stay at the clinic for 1 to 2 hours until the medicines wear off. Then you can go home. But you will need to arrange for a ride. Your doctor will tell you when you can eat and do your other usual activities. Your doctor will talk to you about when you will need your next colonoscopy. Your doctor can help you decide how often you need to be checked. This will depend on the results of your test and your risk for colorectal cancer. After the test, you may be bloated or have gas pains. You may need to pass gas. If a biopsy was done or a polyp was removed, you may have streaks of blood in your stool (feces) for a few days. This care sheet gives you a general idea about how long it will take for you to recover. But each person recovers at a different pace. Follow the steps below to get better as quickly as possible. How can you care for yourself at home? Activity  · Rest when you feel tired. · You can do your normal activities when it feels okay to do so. Diet  · Follow your doctor's directions for eating. · Unless your doctor has told you not to, drink plenty of fluids. This helps to replace the fluids that were lost during the colon prep. · Do not drink alcohol. Medicines  · If polyps were removed or a biopsy was done during the test, your doctor may tell you not to take aspirin or other anti-inflammatory medicines for a few days. These include ibuprofen (Advil, Motrin) and naproxen (Aleve). Other instructions  · For your safety, do not drive or operate machinery until the medicine wears off and you can think clearly. Your doctor may tell you not to drive or operate machinery until the day after your test.  · Do not sign legal documents or make major decisions until the medicine wears off and you can think clearly. The anesthesia can make it hard for you to fully understand what you are agreeing to.   Follow-up care is a key part of your treatment and safety. Be sure to make and go to all appointments, and call your doctor if you are having problems. It's also a good idea to know your test results and keep a list of the medicines you take. When should you call for help? Call 911 anytime you think you may need emergency care. For example, call if:  · You passed out (lost consciousness). · You pass maroon or bloody stools. · You have severe belly pain. Call your doctor now or seek immediate medical care if:  · Your stools are black and tarlike. · Your stools have streaks of blood, but you did not have a biopsy or any polyps removed. · You have belly pain, or your belly is swollen and firm. · You vomit. · You have a fever. · You are very dizzy. Watch closely for changes in your health, and be sure to contact your doctor if you have any problems. Where can you learn more? Go to [employer]  Enter E264 in the search box to learn more about \"Colonoscopy: What to Expect at Home. \"   © 5508-8646 Healthwise, Incorporated. Care instructions adapted under license by [facility] (which disclaims liability or warranty for this information). This care instruction is for use with your licensed healthcare professional. If you have questions about a medical condition or this instruction, always ask your healthcare professional. [place] [address] any warranty or liability for your use of this information. Content Version: 45.3.914040; Current as of: November 14, 2014      DISCHARGE SUMMARY from Nurse     POST-PROCEDURE INSTRUCTIONS:    Call your Physician if you:  ? Observe any excess bleeding. ? Develop a temperature over 100.5o F.  ? Experience abdominal, shoulder or chest pain. ? Notice any signs of decreased circulation or nerve impairment to an extremity such as a change in color, persistent numbness, tingling, coldness or increase in pain. ?  Vomit blood or you have nausea and vomiting lasting longer than 4 hours. ? Are unable to take medications. ? Are unable to urinate within 8 hours after discharge following general anesthesia or intravenous sedation. For the next 24 hours after receiving general anesthesia or intravenous sedation, or while taking prescription Narcotics, limit your activities:  ? Do NOT drive a motor vehicle, operate hazard machinery or power tools, or perform tasks that require coordination. The medication you received during your procedure may have some effect on your mental awareness. ? Do NOT make important personal or business decisions. The medication you received during your procedure may have some effect on your mental awareness. ? Do NOT drink alcoholic beverages. These drinks do not mix well with the medications that have been given to you. ? Upon discharge from the hospital, you must be accompanied by a responsible adult. ? Resume your diet as directed by your physician. ? Resume medications as your physician has prescribed. ? Please give a list of your current medications to your Primary Care Provider. ? Please update this list whenever your medications are discontinued, doses are changed, or new medications (including over-the-counter products) are added. ? Please carry medication information at all times in case of emergency situations. These are general instructions for a healthy lifestyle:    No smoking/ No tobacco products/ Avoid exposure to second hand smoke.  Surgeon General's Warning:  Quitting smoking now greatly reduces serious risk to your health. Obesity, smoking, and a sedentary lifestyle greatly increase your risk for illness.    A healthy diet, regular physical exercise & weight monitoring are important for maintaining a healthy lifestyle   You may be retaining fluid if you have a history of heart failure or if you experience any of the following symptoms:  Weight gain of 3 pounds or more overnight or 5 pounds in a week, increased swelling in our hands or feet or shortness of breath while lying flat in bed. Please call your doctor as soon as you notice any of these symptoms; do not wait until your next office visit. Recognize signs and symptoms of STROKE:  F  -  Face looks uneven  A  -  Arms unable to move or move unevenly  S  -  Speech slurred or non-existent  T  -  Time to call 911 - as soon as signs and symptoms begin - DO NOT go back to bed or wait to see If you get better - TIME IS BRAIN. Colorectal Screening   Colorectal cancer almost always develops from precancerous polyps (abnormal growths) in the colon or rectum. Screening tests can find precancerous polyps, so that they can be removed before they turn into cancer. Screening tests can also find colorectal cancer early, when treatment works best.  Claude Notice Speak with your physician about when you should begin screening and how often you should be tested. Additional Information    If you have questions, please call [phone]. Remember, [employer] is NOT to be used for urgent needs. For medical emergencies, dial 911. Educational references and/or instructions provided during this visit included:    See attached. Discharge information has been reviewed with the patient. The patientPatient Education        Diverticulosis: Care Instructions  Your Care Instructions  In diverticulosis, pouches called diverticula form in the wall of the large intestine (colon). The pouches do not cause any pain or other symptoms. Most people who have diverticulosis do not know they have it. But the pouches sometimes bleed, and if they become infected, they can cause pain and other symptoms. When this happens, it is called diverticulitis. Diverticula form when pressure pushes the wall of the colon outward at certain weak points. A diet that is too low in fiber can cause diverticula. Follow-up care is a key part of your treatment and safety.  Be sure to make and go to all appointments, and call your doctor if you are having problems. It's also a good idea to know your test results and keep a list of the medicines you take. How can you care for yourself at home? · Include fruits, leafy green vegetables, beans, and whole grains in your diet each day. These foods are high in fiber. · Take a fiber supplement, such as Citrucel or Metamucil, every day if needed. Read and follow all instructions on the label. · Drink plenty of fluids, enough so that your urine is light yellow or clear like water. If you have kidney, heart, or liver disease and have to limit fluids, talk with your doctor before you increase the amount of fluids you drink. · Get at least 30 minutes of exercise on most days of the week. Walking is a good choice. You also may want to do other activities, such as running, swimming, cycling, or playing tennis or team sports. · Cut out foods that cause gas, pain, or other symptoms. When should you call for help? Call your doctor now or seek immediate medical care if:    · You have belly pain.     · You pass maroon or very bloody stools.     · You have a fever.     · You have nausea and vomiting.     · You have unusual changes in your bowel movements or abdominal swelling.     · You have burning pain when you urinate.     · You have abnormal vaginal discharge.     · You have shoulder pain.     · You have cramping pain that does not get better when you have a bowel movement or pass gas.     · You pass gas or stool from your urethra while urinating.    Watch closely for changes in your health, and be sure to contact your doctor if you have any problems. Where can you learn more? Go to http://ad-santo.info/. Enter I520 in the search box to learn more about \"Diverticulosis: Care Instructions. \"  Current as of: November 7, 2018  Content Version: 12.1  © 7344-3955 Healthwise, Incorporated.  Care instructions adapted under license by [facility] (which disclaims liability or warranty for this information). If you have questions about a medical condition or this instruction, always ask your healthcare professional. [place] [address] any warranty or liability for your use of this information. verbalized understanding.

## 2019-09-10 NOTE — PROCEDURES
[facility], [address]      Brief Procedure Note    Warren Ngo  1945  [id number]    Date of Procedure: 9/10/2019    Preoperative diagnosis: V76.51 - Z12.11,   Colon cancer Screening  V18.51 - Z83.71,  Family history of colonic polyps  564.1 - K58.9,  Irritable bowel syndrome    Postoperative diagnosis:  diverticulosis othewrise normal colonoscopy    Type of Anesthesia: MAC (monitered anesthesia care)    Description of Findings: same as post op dx    Procedure: Procedure(s):  COLONOSCOPY    :  Dr. Yesy Chicas MD    Assistant(s): [unfilled]    Type of Anesthesia:MAC     EBL:None    Specimens: * No specimens in log *    Findings: See printed and scanned procedure note    Complications: None    Dr. Yesy Chicas MD  9/10/2019  7:58 AM

## 2019-09-10 NOTE — ANESTHESIA POSTPROCEDURE EVALUATION
Procedure(s):  COLONOSCOPY. MAC    Anesthesia Post Evaluation      Multimodal analgesia: multimodal analgesia used between 6 hours prior to anesthesia start to PACU discharge  Patient location during evaluation: PACU  Patient participation: complete - patient participated  Level of consciousness: awake and alert  Pain management: adequate  Airway patency: patent  Anesthetic complications: no  Cardiovascular status: acceptable and hemodynamically stable  Respiratory status: acceptable and room air  Hydration status: acceptable  Post anesthesia nausea and vomiting:  controlled      Vitals Value Taken Time   /56 9/10/2019  8:34 AM   Temp     Pulse 60 9/10/2019  8:34 AM   Resp 16 9/10/2019  8:34 AM   SpO2 100 % 9/10/2019  8:34 AM   Vitals shown include unvalidated device data.

## 2020-01-12 ENCOUNTER — APPOINTMENT (OUTPATIENT)
Dept: CT IMAGING | Age: 75
End: 2020-01-12
Attending: NURSE PRACTITIONER
Payer: MEDICARE

## 2020-01-12 ENCOUNTER — APPOINTMENT (OUTPATIENT)
Dept: GENERAL RADIOLOGY | Age: 75
End: 2020-01-12
Attending: INTERNAL MEDICINE
Payer: MEDICARE

## 2020-01-12 ENCOUNTER — HOSPITAL ENCOUNTER (OUTPATIENT)
Age: 75
Setting detail: OBSERVATION
Discharge: HOME | End: 2020-01-12
Attending: EMERGENCY MEDICINE | Admitting: INTERNAL MEDICINE
Payer: MEDICARE

## 2020-01-12 VITALS
HEART RATE: 70 BPM | TEMPERATURE: 97.5 F | DIASTOLIC BLOOD PRESSURE: 85 MMHG | SYSTOLIC BLOOD PRESSURE: 161 MMHG | BODY MASS INDEX: 23.44 KG/M2 | WEIGHT: 120 LBS | OXYGEN SATURATION: 100 % | RESPIRATION RATE: 16 BRPM

## 2020-01-12 DIAGNOSIS — R10.31 ABDOMINAL PAIN, RIGHT LOWER QUADRANT: ICD-10-CM

## 2020-01-12 DIAGNOSIS — K56.609 SBO (SMALL BOWEL OBSTRUCTION) (HCC): Primary | ICD-10-CM

## 2020-01-12 PROBLEM — R10.9 ABDOMINAL PAIN: Status: ACTIVE | Noted: 2020-01-12

## 2020-01-12 LAB
ALBUMIN SERPL-MCNC: 4.3 G/DL (ref 3.4–5)
ALBUMIN/GLOB SERPL: 1.1 {RATIO} (ref 0.8–1.7)
ALP SERPL-CCNC: 108 U/L (ref 45–117)
ALT SERPL-CCNC: 26 U/L (ref 13–56)
ANION GAP SERPL CALC-SCNC: 9 MMOL/L (ref 3–18)
APPEARANCE UR: ABNORMAL
AST SERPL-CCNC: 27 U/L (ref 10–38)
ATRIAL RATE: 63 BPM
BACTERIA URNS QL MICRO: NEGATIVE /HPF
BASOPHILS # BLD: 0 K/UL (ref 0–0.06)
BASOPHILS NFR BLD: 0 % (ref 0–3)
BILIRUB SERPL-MCNC: 0.3 MG/DL (ref 0.2–1)
BILIRUB UR QL: NEGATIVE
BUN SERPL-MCNC: 11 MG/DL (ref 7–18)
BUN/CREAT SERPL: 15 (ref 12–20)
CALCIUM SERPL-MCNC: 9.7 MG/DL (ref 8.5–10.1)
CALCULATED P AXIS, ECG09: 13 DEGREES
CALCULATED R AXIS, ECG10: -3 DEGREES
CALCULATED T AXIS, ECG11: 8 DEGREES
CHLORIDE SERPL-SCNC: 98 MMOL/L (ref 100–111)
CO2 SERPL-SCNC: 30 MMOL/L (ref 21–32)
COLOR UR: YELLOW
CREAT SERPL-MCNC: 0.71 MG/DL (ref 0.6–1.3)
DIAGNOSIS, 93000: NORMAL
DIFFERENTIAL METHOD BLD: ABNORMAL
EOSINOPHIL # BLD: 0 K/UL (ref 0–0.4)
EOSINOPHIL NFR BLD: 0 % (ref 0–5)
EPITH CASTS URNS QL MICRO: NORMAL /LPF (ref 0–5)
ERYTHROCYTE [DISTWIDTH] IN BLOOD BY AUTOMATED COUNT: 13.4 % (ref 11.6–14.5)
GLOBULIN SER CALC-MCNC: 3.8 G/DL (ref 2–4)
GLUCOSE SERPL-MCNC: 153 MG/DL (ref 74–99)
GLUCOSE UR STRIP.AUTO-MCNC: NEGATIVE MG/DL
HCT VFR BLD AUTO: 41.4 % (ref 35–45)
HGB BLD-MCNC: 14 G/DL (ref 12–16)
HGB UR QL STRIP: NEGATIVE
HYALINE CASTS URNS QL MICRO: NORMAL /LPF (ref 0–2)
KETONES UR QL STRIP.AUTO: NEGATIVE MG/DL
LACTATE BLD-SCNC: 1.44 MMOL/L (ref 0.4–2)
LACTATE BLD-SCNC: 1.77 MMOL/L (ref 0.4–2)
LEUKOCYTE ESTERASE UR QL STRIP.AUTO: NEGATIVE
LIPASE SERPL-CCNC: 114 U/L (ref 73–393)
LYMPHOCYTES # BLD: 1.2 K/UL (ref 0.8–3.5)
LYMPHOCYTES NFR BLD: 7 % (ref 20–51)
MCH RBC QN AUTO: 28.9 PG (ref 24–34)
MCHC RBC AUTO-ENTMCNC: 33.8 G/DL (ref 31–37)
MCV RBC AUTO: 85.5 FL (ref 74–97)
MONOCYTES # BLD: 0.8 K/UL (ref 0–1)
MONOCYTES NFR BLD: 5 % (ref 2–9)
NEUTS BAND NFR BLD MANUAL: 2 % (ref 0–5)
NEUTS SEG # BLD: 14.6 K/UL (ref 1.8–8)
NEUTS SEG NFR BLD: 86 % (ref 42–75)
NITRITE UR QL STRIP.AUTO: NEGATIVE
P-R INTERVAL, ECG05: 136 MS
PH UR STRIP: 5 [PH] (ref 5–8)
PLATELET # BLD AUTO: 323 K/UL (ref 135–420)
PLATELET COMMENTS,PCOM: ABNORMAL
PMV BLD AUTO: 9.1 FL (ref 9.2–11.8)
POTASSIUM SERPL-SCNC: 3.6 MMOL/L (ref 3.5–5.5)
PROT SERPL-MCNC: 8.1 G/DL (ref 6.4–8.2)
PROT UR STRIP-MCNC: 30 MG/DL
Q-T INTERVAL, ECG07: 402 MS
QRS DURATION, ECG06: 72 MS
QTC CALCULATION (BEZET), ECG08: 411 MS
RBC # BLD AUTO: 4.84 M/UL (ref 4.2–5.3)
RBC #/AREA URNS HPF: NORMAL /HPF (ref 0–5)
RBC MORPH BLD: ABNORMAL
SODIUM SERPL-SCNC: 137 MMOL/L (ref 136–145)
SP GR UR REFRACTOMETRY: 1.02 (ref 1–1.03)
UROBILINOGEN UR QL STRIP.AUTO: 0.2 EU/DL (ref 0.2–1)
VENTRICULAR RATE, ECG03: 63 BPM
WBC # BLD AUTO: 16.6 K/UL (ref 4.6–13.2)
WBC URNS QL MICRO: NORMAL /HPF (ref 0–5)

## 2020-01-12 PROCEDURE — 85025 COMPLETE CBC W/AUTO DIFF WBC: CPT

## 2020-01-12 PROCEDURE — 96374 THER/PROPH/DIAG INJ IV PUSH: CPT

## 2020-01-12 PROCEDURE — 81001 URINALYSIS AUTO W/SCOPE: CPT

## 2020-01-12 PROCEDURE — 74011250637 HC RX REV CODE- 250/637: Performed by: INTERNAL MEDICINE

## 2020-01-12 PROCEDURE — 74011250636 HC RX REV CODE- 250/636: Performed by: NURSE PRACTITIONER

## 2020-01-12 PROCEDURE — 74011636320 HC RX REV CODE- 636/320: Performed by: EMERGENCY MEDICINE

## 2020-01-12 PROCEDURE — 93005 ELECTROCARDIOGRAM TRACING: CPT

## 2020-01-12 PROCEDURE — 83605 ASSAY OF LACTIC ACID: CPT

## 2020-01-12 PROCEDURE — 96375 TX/PRO/DX INJ NEW DRUG ADDON: CPT

## 2020-01-12 PROCEDURE — 74177 CT ABD & PELVIS W/CONTRAST: CPT

## 2020-01-12 PROCEDURE — 99218 HC RM OBSERVATION: CPT

## 2020-01-12 PROCEDURE — 74019 RADEX ABDOMEN 2 VIEWS: CPT

## 2020-01-12 PROCEDURE — 99283 EMERGENCY DEPT VISIT LOW MDM: CPT

## 2020-01-12 PROCEDURE — 80053 COMPREHEN METABOLIC PANEL: CPT

## 2020-01-12 PROCEDURE — 65390000012 HC CONDITION CODE 44 OBSERVATION

## 2020-01-12 PROCEDURE — 96361 HYDRATE IV INFUSION ADD-ON: CPT

## 2020-01-12 PROCEDURE — 74011250636 HC RX REV CODE- 250/636: Performed by: INTERNAL MEDICINE

## 2020-01-12 PROCEDURE — 83690 ASSAY OF LIPASE: CPT

## 2020-01-12 RX ORDER — ONDANSETRON 2 MG/ML
4 INJECTION INTRAMUSCULAR; INTRAVENOUS
Status: COMPLETED | OUTPATIENT
Start: 2020-01-12 | End: 2020-01-12

## 2020-01-12 RX ORDER — ONDANSETRON 2 MG/ML
INJECTION INTRAMUSCULAR; INTRAVENOUS
Status: DISPENSED
Start: 2020-01-12 | End: 2020-01-12

## 2020-01-12 RX ORDER — ONDANSETRON 2 MG/ML
4 INJECTION INTRAMUSCULAR; INTRAVENOUS
Status: DISCONTINUED | OUTPATIENT
Start: 2020-01-12 | End: 2020-01-12 | Stop reason: HOSPADM

## 2020-01-12 RX ORDER — POLYETHYLENE GLYCOL 3350 17 G/17G
17 POWDER, FOR SOLUTION ORAL DAILY
Qty: 30 PACKET | Refills: 0 | Status: SHIPPED | OUTPATIENT
Start: 2020-01-12 | End: 2020-10-15

## 2020-01-12 RX ORDER — POLYETHYLENE GLYCOL 3350 17 G/17G
17 POWDER, FOR SOLUTION ORAL DAILY PRN
Status: DISCONTINUED | OUTPATIENT
Start: 2020-01-12 | End: 2020-01-12 | Stop reason: HOSPADM

## 2020-01-12 RX ORDER — HEPARIN SODIUM 5000 [USP'U]/ML
5000 INJECTION, SOLUTION INTRAVENOUS; SUBCUTANEOUS EVERY 8 HOURS
Status: DISCONTINUED | OUTPATIENT
Start: 2020-01-12 | End: 2020-01-12 | Stop reason: HOSPADM

## 2020-01-12 RX ORDER — DOCUSATE SODIUM 100 MG/1
100 CAPSULE, LIQUID FILLED ORAL DAILY
Status: DISCONTINUED | OUTPATIENT
Start: 2020-01-12 | End: 2020-01-12 | Stop reason: HOSPADM

## 2020-01-12 RX ORDER — MORPHINE SULFATE 4 MG/ML
4 INJECTION INTRAVENOUS
Status: COMPLETED | OUTPATIENT
Start: 2020-01-12 | End: 2020-01-12

## 2020-01-12 RX ORDER — MORPHINE SULFATE 4 MG/ML
INJECTION INTRAVENOUS
Status: DISPENSED
Start: 2020-01-12 | End: 2020-01-12

## 2020-01-12 RX ORDER — SODIUM CHLORIDE 9 MG/ML
75 INJECTION, SOLUTION INTRAVENOUS CONTINUOUS
Status: DISCONTINUED | OUTPATIENT
Start: 2020-01-12 | End: 2020-01-12 | Stop reason: HOSPADM

## 2020-01-12 RX ADMIN — DOCUSATE SODIUM 100 MG: 100 CAPSULE, LIQUID FILLED ORAL at 09:55

## 2020-01-12 RX ADMIN — IOPAMIDOL 80 ML: 612 INJECTION, SOLUTION INTRAVENOUS at 02:12

## 2020-01-12 RX ADMIN — SODIUM CHLORIDE 75 ML/HR: 900 INJECTION, SOLUTION INTRAVENOUS at 03:46

## 2020-01-12 RX ADMIN — ONDANSETRON 4 MG: 2 INJECTION INTRAMUSCULAR; INTRAVENOUS at 01:45

## 2020-01-12 RX ADMIN — MORPHINE SULFATE 4 MG: 4 INJECTION INTRAVENOUS at 01:45

## 2020-01-12 NOTE — PHYSICIAN ADVISORY
[facility]     Physician Advisor Recommendation      The information in this document is a recommendation to be used for utilization review and utilization management purposes only. This recommendation is not an order. The recommendation is made based on the information reviewed at the time of the referral, is pursuant to the [facility] Conditions of Participation (42 CFR Part 482), and is neither a judgment nor an assessment with regard to the appropriateness or quality of clinical care. Nothing in this document may be used to limit, alter, or affect clinical services provided to the patient named below. The provider of services is ultimately responsible for the submission of a claim that has met all requirements for correct coding, billing, and reimbursement.       Letter of Status Determination:    Recommend Changing patient status from   INPATIENT to OBSERVATION      Pt Name:  Marci Stanley   MR#  [id number]   [facility]#   [id number]   Derian Lr  @ [facility]   Hospitalization date  1/12/2020 12:49 AM   Current Attending Physician  Sulaiman Holedr MD   Principal diagnosis  <principal problem not specified>      Clinicals  76 y.o. female     Patient with history of diverticulosis and a large hiatal hernia known history of CAD in the family admitted for severe abdominal pain associated with nausea and vomiting last night, at home she took Fleet enema on her own with 1 bowel movement of significant size came to emergency room for persistent abdominal pain in ER 1 dose of morphine given with improvement in her symptoms surgery is consulted, patient is on a sips of water and tolerating well otherwise exam is unremarkable.     Currently 10:54 AM  Patient is alert awake oriented asymptomatic passing flatus no nausea vomiting diarrhea Case discussed with general surgery, it is okay to discharge patient with instruction to go on a light fruit and slowly increase the consistency.   Patient should come back if she develops any nausea vomiting abdominal pain.     MiraLAX prescription will be written, she will continue all other medications as an outpatient            STATUS DETERMINATION  On the basis of review of available clinical data, documentation in the chart  It is our recommendation that the patient's status is changed from INPATIENT to OBSERVATION         The final decision of the patient's hospitalization status depends on the attending physician's judgment      Additional comments     Insurance  Payor: Tal Dear / Plan: [address] [place] [address] / Product Type: Medicare /           Jose Day MD, [place]   Physician [facility] [address] Phone:     Subscriber: Mary Enciso Subscriber#: [id number]    Group#:  Precert#:         [employer] [address] Phone:     Subscriber: Mary Enc[place] Subscriber#: [id number]    Group#:  Precert#:

## 2020-01-12 NOTE — H&P
GENERAL GENERIC H&P/CONSULT    Subjective:  80-year-old female with no significant past medical history presented to the emergency department with a complaint of sharp constant pain horizontally across the lower section of the abdomen since around 9 PM yesterday. Patient gave herself enema around 10 PM which resulted in a good amount of BM however no relief of the pain. Pain was constant until relieved with IV morphine given in the emergency department. Denies associated nausea or vomiting. Denies fever, chills, diarrhea. Denies shortness of breath, cough or chest pain. Patient is accompanied by . ED vitals within normal limits, leukocytosis of 16.6K otherwise blood work is unremarkable. CT of the abdomen revealed mild distal small bowel obstruction with transition point in the low mid pelvis. Dr. Jorge Luis Sanchez was consulted in the ED, general surgery service will continue to follow. Past Medical History:   Diagnosis Date    Hiatal hernia     IBS (irritable bowel syndrome)       Past Surgical History:   Procedure Laterality Date    COLONOSCOPY N/A 9/10/2019    COLONOSCOPY performed by Saul Iglesias MD at [facility]    [address]      together with tummy tuck     HX HYSTERECTOMY      HX KNEE REPLACEMENT Right 04/17/2018      Prior to Admission medications    Medication Sig Start Date End Date Taking? Authorizing Provider   naproxen sodium (ALEVE) 220 mg cap Take  by mouth daily as needed. Provider, Historical   amoxicillin (AMOXIL) 500 mg capsule 4 po one hour prior to procedure 7/8/19   Dave Dean PA-C   cholecalciferol (VITAMIN D3) 1,000 unit tablet Take 5,000 Units by mouth daily.     Provider, Historical     No Known Allergies   Social History     Tobacco Use    Smoking status: Never Smoker    Smokeless tobacco: Never Used   Substance Use Topics    Alcohol use: Not Currently     Alcohol/week: 0.8 standard drinks     Types: 1 Glasses of wine per week      Family History   Problem Relation Age of Onset    Heart Attack Mother 79    Heart Attack Father 79    Other Brother 48        Mesothelioma      Review of Systems   Constitutional: Negative for appetite change, chills, diaphoresis and fever. HENT: Negative. Eyes: Negative for pain and visual disturbance. Respiratory: Negative. Cardiovascular: Negative. Gastrointestinal: Positive for abdominal pain. Negative for diarrhea, nausea and vomiting. Endocrine: Negative. Genitourinary: Negative. Musculoskeletal: Negative. Skin: Negative. Neurological: Negative. Psychiatric/Behavioral: Negative. Objective:    No intake/output data recorded. No intake/output data recorded. Patient Vitals for the past 8 hrs:   BP Temp Pulse Resp SpO2 Weight   01/12/20 0047 161/85 97.5 °F (36.4 °C) 70 16 100 % 54.4 kg (120 lb)     Physical Exam  Constitutional:       General: She is not in acute distress. Appearance: Normal appearance. HENT:      Head: Normocephalic and atraumatic. Mouth/Throat:      Mouth: Mucous membranes are moist.      Pharynx: Oropharynx is clear. Eyes:      Extraocular Movements: Extraocular movements intact. Conjunctiva/sclera: Conjunctivae normal.      Pupils: Pupils are equal, round, and reactive to light. Neck:      Musculoskeletal: Normal range of motion and neck supple. Cardiovascular:      Rate and Rhythm: Normal rate. Pulses: Normal pulses. Heart sounds: Normal heart sounds. No murmur. No friction rub. No gallop. Pulmonary:      Effort: Pulmonary effort is normal.      Breath sounds: Normal breath sounds. Abdominal:      General: Abdomen is flat. There is no distension. Palpations: Abdomen is soft. There is no mass. Tenderness: There is no tenderness. There is no guarding or rebound. Comments: Hyperactive bowel sounds   Musculoskeletal: Normal range of motion. Skin:     General: Skin is warm and dry.    Neurological: General: No focal deficit present. Mental Status: She is alert and oriented to person, place, and time. Mental status is at baseline. Cranial Nerves: No cranial nerve deficit. Motor: No weakness. Psychiatric:         Mood and Affect: Mood normal.         Thought Content: Thought content normal.          Labs:    Recent Results (from the past 24 hour(s))   URINALYSIS W/ RFLX MICROSCOPIC    Collection Time: 01/12/20  1:10 AM   Result Value Ref Range    Color YELLOW      Appearance CLOUDY      Specific gravity 1.021 1.005 - 1.030      pH (UA) 5.0 5.0 - 8.0      Protein 30 (A) NEG mg/dL    Glucose NEGATIVE  NEG mg/dL    Ketone NEGATIVE  NEG mg/dL    Bilirubin NEGATIVE  NEG      Blood NEGATIVE  NEG      Urobilinogen 0.2 0.2 - 1.0 EU/dL    Nitrites NEGATIVE  NEG      Leukocyte Esterase NEGATIVE  NEG     URINE MICROSCOPIC ONLY    Collection Time: 01/12/20  1:10 AM   Result Value Ref Range    WBC 0 to 3 0 - 5 /hpf    RBC 0 to 3 0 - 5 /hpf    Epithelial cells FEW 0 - 5 /lpf    Bacteria NEGATIVE  NEG /hpf    Hyaline cast 0 to 1 0 - 2 /lpf   CBC WITH AUTOMATED DIFF    Collection Time: 01/12/20  1:20 AM   Result Value Ref Range    WBC 16.6 (H) 4.6 - 13.2 K/uL    RBC 4.84 4.20 - 5.30 M/uL    HGB 14.0 12.0 - 16.0 g/dL    HCT 41.4 35.0 - 45.0 %    MCV 85.5 74.0 - 97.0 FL    MCH 28.9 24.0 - 34.0 PG    MCHC 33.8 31.0 - 37.0 g/dL    RDW 13.4 11.6 - 14.5 %    PLATELET 049 789 - 485 K/uL    MPV 9.1 (L) 9.2 - 11.8 FL    NEUTROPHILS 86 (H) 42 - 75 %    BAND NEUTROPHILS 2 0 - 5 %    LYMPHOCYTES 7 (L) 20 - 51 %    MONOCYTES 5 2 - 9 %    EOSINOPHILS 0 0 - 5 %    BASOPHILS 0 0 - 3 %    ABS. NEUTROPHILS 14.6 (H) 1.8 - 8.0 K/UL    ABS. LYMPHOCYTES 1.2 0.8 - 3.5 K/UL    ABS. MONOCYTES 0.8 0 - 1.0 K/UL    ABS. EOSINOPHILS 0.0 0.0 - 0.4 K/UL    ABS.  BASOPHILS 0.0 0.0 - 0.06 K/UL    DF MANUAL      PLATELET COMMENTS ADEQUATE PLATELETS      RBC COMMENTS NORMOCYTIC, NORMOCHROMIC     LIPASE    Collection Time: 01/12/20  1:20 AM Result Value Ref Range    Lipase 114 73 - 661 U/L   METABOLIC PANEL, COMPREHENSIVE    Collection Time: 01/12/20  1:20 AM   Result Value Ref Range    Sodium 137 136 - 145 mmol/L    Potassium 3.6 3.5 - 5.5 mmol/L    Chloride 98 (L) 100 - 111 mmol/L    CO2 30 21 - 32 mmol/L    Anion gap 9 3.0 - 18 mmol/L    Glucose 153 (H) 74 - 99 mg/dL    BUN 11 7.0 - 18 MG/DL    Creatinine 0.71 0.6 - 1.3 MG/DL    BUN/Creatinine ratio 15 12 - 20      GFR est AA >60 >60 ml/min/1.73m2    GFR est non-AA >60 >60 ml/min/1.73m2    Calcium 9.7 8.5 - 10.1 MG/DL    Bilirubin, total 0.3 0.2 - 1.0 MG/DL    ALT (SGPT) 26 13 - 56 U/L    AST (SGOT) 27 10 - 38 U/L    Alk. phosphatase 108 45 - 117 U/L    Protein, total 8.1 6.4 - 8.2 g/dL    Albumin 4.3 3.4 - 5.0 g/dL    Globulin 3.8 2.0 - 4.0 g/dL    A-G Ratio 1.1 0.8 - 1.7     EKG, 12 LEAD, INITIAL    Collection Time: 01/12/20  1:35 AM   Result Value Ref Range    Ventricular Rate 63 BPM    Atrial Rate 63 BPM    P-R Interval 136 ms    QRS Duration 72 ms    Q-T Interval 402 ms    QTC Calculation (Bezet) 411 ms    Calculated P Axis 13 degrees    Calculated R Axis -3 degrees    Calculated T Axis 8 degrees    Diagnosis       Normal sinus rhythm  Normal ECG  When compared with ECG of 05-APR-2018 14:52,  Criteria for Septal infarct are no longer present         ECG: normal EKG, normal sinus rhythm  Chest X-Ray: none    Assessment/plan: Active Problems:    SBO (small bowel obstruction) ([address] (1/12/2020)      Abdominal pain (1/12/2020)      Small bowel obstruction  -Transition point in the low mid pelvis  -Prior intra-abdominal surgeries including hysterectomy and hernia repair  -Pursue conservative management  -No active nausea or vomiting. May not need NG tube at this time  -N.p.o. for bowel rest  -IV fluid  -KUB later today  -Bowel regimen  -Dr. Bettina Crook from general surgery was consulted.   General surgery team will follow    DVT prophylaxis  -Subcu heparin    CODE STATUS  Full code    Signed:  Vj Courtney Dean MD 1/12/2020

## 2020-01-12 NOTE — PROGRESS NOTES
Virtual reviewer communicated change to CM which reflect outpatient observation order written prior to discharge order being written. Code 44 delivered to patient. CM met with the patient and provided to the patient the printed information about her outpatient observation status. The patient was given the flyer entitled, \"Medicare Outpatient Observation: Information & notification. \" All questions were answered, no additional discharge needs identified at this time and patient expects to return to their (home, assisted living facility, relatives home, etc.) after discharge today. Oral and Written notification given to patient and/or caregiver informing them that they are currently an Outpatient receiving care in our facility. Outpatient services include Observation Services under [facility] and [facility] requirements.     JD Zeng, RN  Pager # [phone]  Care Manager

## 2020-01-12 NOTE — PROGRESS NOTES
[facility] Hospitalist Group  Progress Note    Patient: Donell Lloyd Age: 76 y.o. : 1945 MR#: [id number] SSN: [id number]  Date/Time: 2020     Subjective:     Review of systems  Patient is sitting up in bed very comfortable denies any chest pain denies any shortness of breath abdominal pain has improved dramatically after dose of morphine    No fever chills      Assessment/Plan:   Patient with history of diverticulosis and a large hiatal hernia known history of CAD in the family admitted for severe abdominal pain associated with nausea and vomiting last night, at home she took Fleet enema on her own with 1 bowel movement of significant size came to emergency room for persistent abdominal pain in ER 1 dose of morphine given with improvement in her symptoms surgery is consulted, patient is on a sips of water and tolerating well otherwise exam is unremarkable    1.   Abdominal pain  2 acute nausea vomiting  3 small bowel obstruction partial versus complete  4 large hiatal hernia  5 history of diverticulosis  6 leukocytosis-no evidence of any infective foci    Plan  -Continue observation pain management and IV fluids monitor electrolytes      -Discussed with patient and family members in the room with patient    Continue other management per admitting MD and surgery    Case discussed with:  [x]Patient  [x] Family ( In room with patient )    []Nursing  []Case Management  DVT Prophylaxis:  []Lovenox  []Hep SQ  []SCDs  []Coumadin   []On Heparin gtt          Objective:   VS:   Visit Vitals  /85 (BP 1 Location: Left arm, BP Patient Position: At rest)   Pulse 70   Temp 97.5 °F (36.4 °C)   Resp 16   Wt 54.4 kg (120 lb)   SpO2 100%   BMI 23.44 kg/m²      Tmax/24hrs: Temp (24hrs), Av.5 °F (36.4 °C), Min:97.5 °F (36.4 °C), Max:97.5 °F (36.4 °C)  IOBRIEFNo intake or output data in the 24 hours ending 20 1028    General:  Alert, cooperative, no acute distress   Cardiovascular: S1S2 - regular , No Murmur   Pulmonary: Equal expansion , No Use of accessory muscles , No Rales No Rhonchi    GI:  +BS in all four quadrants, soft, non-tender  Extremities:  No edema; 2+ dorsalis pedis pulses bilaterally  Neuro: Alert and oriented X 2. Medications:   Current Facility-Administered Medications   Medication Dose Route Frequency    heparin (porcine) injection 5,000 Units  5,000 Units SubCUTAneous Q8H    0.9% sodium chloride infusion  75 mL/hr IntraVENous CONTINUOUS    ondansetron (ZOFRAN) injection 4 mg  4 mg IntraVENous Q4H PRN    docusate sodium (COLACE) capsule 100 mg  100 mg Oral DAILY    polyethylene glycol (MIRALAX) packet 17 g  17 g Oral DAILY PRN     Current Outpatient Medications   Medication Sig    naproxen sodium (ALEVE) 220 mg cap Take  by mouth daily as needed.  amoxicillin (AMOXIL) 500 mg capsule 4 po one hour prior to procedure    cholecalciferol (VITAMIN D3) 1,000 unit tablet Take 5,000 Units by mouth daily.        Labs:    Recent Labs     01/12/20  0120   WBC 16.6*   HGB 14.0   HCT 41.4        Recent Labs     01/12/20  0120      K 3.6   CL 98*   CO2 30   *   BUN 11   CREA 0.71   CA 9.7   ALB 4.3   SGOT 27   ALT 26         Signed By: Kalli Gil MD     January 12, 2020

## 2020-01-12 NOTE — DISCHARGE SUMMARY
Discharge Summary     Patient ID:  Alfred Hassan  [id number]  18 y.o.  1945  Body mass index is 23.44 kg/m². PCP on record: Francy Lopez MD    Admit date: 1/12/2020  Discharge date and time: 1/12/2020    Discharge Diagnoses:                                           .1  Abdominal pain  2 acute nausea vomiting  3 small bowel obstruction partial versus complete- suspected / ruled out   4 large hiatal hernia  5 history of diverticulosis  6 leukocytosis-no evidence of any infective foci      Consults: [address] Course by problems:  Patient with history of diverticulosis and a large hiatal hernia known history of CAD in the family admitted for severe abdominal pain associated with nausea and vomiting last night, at home she took Fleet enema on her own with 1 bowel movement of significant size came to emergency room for persistent abdominal pain in ER 1 dose of morphine given with improvement in her symptoms surgery is consulted, patient is on a sips of water and tolerating well otherwise exam is unremarkable. Currently 10:54 AM  Patient is alert awake oriented asymptomatic passing flatus no nausea vomiting diarrhea Case discussed with general surgery, it is okay to discharge patient with instruction to go on a light fruit and slowly increase the consistency. Patient should come back if she develops any nausea vomiting abdominal pain. MiraLAX prescription will be written, she will continue all other medications as an outpatient        Patient seen and examined by me on discharge day.   Pertinent Findings:  Stable    Significant Diagnostic Studies:    CT ABD PELV W CONT (Accession [id number]) (Order [id number])   Allergies      No Known Allergies   Exam Information     Status Exam Begun  Exam Ended    Final [99] 1/12/2020 02:12 1/12/2020 02:13   Result Information     Status: Final result (Exam End: 1/12/2020 02:13) Provider Status: Open   Study Result     Exam: CT abdomen and pelvis with IV contrast      INDICATION: Sudden onset of sharp abdominal pain     TECHNIQUE: Serial axial images were obtained of the abdomen and pelvis following  the uneventful administration of 100 cc Isovue-300 intravenous contrast. Coronal  and sagittal reconstructions were obtained. All CT scans are performed using  dose optimization techniques as appropriate to the performed exam including the  following: Automated exposure control, adjustment of mA and/or kV according to  patient size, and use of iterative reconstructive technique.      COMPARISON: 7/5/2018     FINDINGS:  Lung bases: Lungs are clear. Large hiatal hernia with intrathoracic stomach. No  pleural or pericardial effusion. The liver, gallbladder, spleen, adrenal glands, pancreas, and kidneys are  unremarkable. No aortic aneurysm. No lymphadenopathy or free fluid.     GI: Duodenal diverticulum in the right upper quadrant. There are dilated  fluid-filled loops of small bowel in the lower abdomen and pelvis with a  transition point in the mid low pelvis. Colonic diverticulosis.     Prior hysterectomy. Bladder unremarkable.     Thoracolumbar scoliosis with multilevel spondylosis.       IMPRESSION  IMPRESSION:     1. Mid distal small bowel obstruction, with a transition point in the low mid  pelvis. ? Adhesion. 2. Large hiatal hernia containing intrathoracic stomach similar to prior. 3.  Diverticulosis. 4. Prior hysterectomy       Pertinent Lab Data:  Recent Labs     01/12/20  0120   WBC 16.6*   HGB 14.0   HCT 41.4        Recent Labs     01/12/20  0120      K 3.6   CL 98*   CO2 30   *   BUN 11   CREA 0.71   CA 9.7   ALB 4.3   SGOT 27   ALT 26       DISCHARGE MEDICATIONS:   @  Current Discharge Medication List      START taking these medications    Details   polyethylene glycol (MIRALAX) 17 gram packet Take 1 Packet by mouth daily.   Qty: 30 Packet, Refills: 0         CONTINUE these medications which have NOT CHANGED Details   cholecalciferol (VITAMIN D3) 1,000 unit tablet Take 5,000 Units by mouth daily. STOP taking these medications       naproxen sodium (ALEVE) 220 mg cap Comments:   Reason for Stopping:         amoxicillin (AMOXIL) 500 mg capsule Comments:   Reason for Stopping:                 My Recommended Diet, Activity, Wound Care, and follow-up labs are listed in the patient's Discharge Insturctions which I have personally completed and reviewed. Disposition:     [x] Home with family     [] [facility] PT/RN   [] SNF/NH   [] Inpatient Rehab/ARTURO  Condition at Discharge:  Stable    Follow up with:   PCP : Nidia Renee MD      Please follow-up tests/labs that are still pendin.  None  2.    >30 minutes spent coordinating this discharge (review instructions/follow-up, prescriptions, preparing report for sign off)    Signed:  Silvana Alvarez MD  2020  10:53 AM

## 2020-01-12 NOTE — DISCHARGE INSTRUCTIONS

## 2020-01-13 NOTE — CONSULTS
[facility]    Name:  Ksenia Good  MR#:   [id number]  :  1945  ACCOUNT #:  [de-identified]  DATE OF SERVICE:  2020    GENERAL SURGICAL CONSULTATION    REASON FOR CONSULTATION:  Abdominal pain-now resolved. HISTORY OF THE CHIEF COMPLAINT:  This is a 26-year-old white female who [facility] for evaluation in regard to the sudden onset of diffuse, constant, sharp abdominal pain across the entire lower abdomen. The patient noted that she gave herself an enema approximately 1 hour after presentation and has had subsequent resolution of her abdominal discomfort, currently specifically denies abdominal discomfort, is tolerating ice chips and continues to pass flatus and stool. Apparently, consultation from the emergency department for General Surgery was precipitated by a CT scan which noted the potential of a early small bowel obstruction as well as leukocytosis. The patient does have an extensive abdominal surgical history, and denies symptoms of a similar type in her past.    ALLERGIES:  NO KNOWN DRUG ALLERGIES. PREVIOUS MEDICATIONS:  Aleve 220 mg t.i.d. p.r.n., vitamin D3 1000 units daily. PAST MEDICAL HISTORY:  1. History of asymptomatic known hiatal hernia. 2.  Irritable bowel syndrome. 3.  Vitamin D deficiency. PAST SURGICAL HISTORY:  1. Tonsillectomy as a child. 2.  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  3.  Incisional hernia repair in 2016 with implantation of mesh. 4.  Right knee replacement in 2017. SOCIAL HISTORY:  The patient utilizes one ounce of wine on a weekly basis. She does not utilize tobacco.    FAMILY HISTORY:  Mother  at the age of 80 of coronary artery disease, Alzheimer's disease. Father  at the age of 68 with myocardial infarction.   She had a  brother in his 62s of mesothelioma as a result asbestos exposure. REVIEW OF SYSTEMS:  GENERAL:  The patient denies any recent change in her sleep or dietary patterns except as noted. HEENT:  She denies changes in her visual or auditory acuity, recurrent pharyngitis, chronic rhinorrhea, or recurrent epistaxis. CARDIOVASCULAR:  The patient denies history of hypertension, chest pain, no cardiac disease, heart murmur. PULMONARY:  The patient denies productive cough, hemoptysis, increasing shortness of breath. GASTROINTESTINAL:  As noted in the history of chief complaint. GENITOURINARY:  Denies frequency, urgency, hesitancy, or dysuria. MUSCULOSKELETAL:  Has a history of a right knee replacement and diffuse osteoarthritis, but denies fractures or dislocations. NEUROLOGIC:  Denies history of stroke, paralysis, paresthesias, or recurrent cephalgia. PHYSICAL EXAMINATION:  VITAL SIGNS:  The patient is 120 pounds. Temperature is 97.5, pulse 70, respiratory rate is 16, blood pressure is 161/85, room oxygen saturations are 100%. GENERAL:  The patient is an alert, elderly white female, in no acute distress. She is oriented x3, conversant, and nontoxic in appearance. HEENT:  Unremarkable. LUNGS:  Clear to auscultation without adventitious sounds with equal and symmetric excursions bilaterally. CARDIAC:  Reveals a regular rate and rhythm without rub, gallop, or murmur. SPINAL:  Notable for normal kyphotic curves without CVA tenderness. ABDOMEN:  Flat with mild obesity. Normoactive bowel sounds, is nondistended and without tenderness. The patient has a well-healed midline incision extending from the xyphoid junction to her symphysis pubis without evidence of a hernia. BREAST, PELVIC, AND RECTAL:  Not performed. EXTREMITIES:  Have full range of motion without deformity. Pulses 4/4 and symmetric in dorsalis pedis, posterior tibial, femoral artery, radial, brachial, and carotid distributions. NEUROLOGIC:  Cranial nerves II through XII are intact.   Motor is 5/5 in all major muscle groups of the upper and lower extremities in a symmetric fashion. Sensory examination is intact to light touch, pinprick, and proprioception. Deep tendon reflexes are +2/4 and symmetric in the upper and lower extremities. LABORATORY EVALUATION:  Laboratory profile completed at 01:20 on 01/12/2020 was notable for the presence of white blood cell count of 16.6, was otherwise unremarkable. A CMP was notable for a chloride of 98, a glucose of 153 and was otherwise unremarkable. Urinalysis was normal.    Abdominal and pelvic CT scan was obtained noting the presence of a known hiatal hernia, a duodenal diverticulum as well as dilated loops of small bowel, lower abdomen with a potential transition point in the middle of the pelvis along with clogged diverticulosis and evidence of a prior hysterectomy. IMPRESSION:  1. Abdominal pain of unknown etiology-resolved. 2.  Known stable hiatal hernia. 3.  Irritable bowel syndrome. 4.  Vitamin D deficiency. RECOMMENDATIONS:  I would recommend that the patient's diet be advanced, and if she tolerates this without difficulty, I would discharge her home with no further recommendations. I will see her in followup as needed if necessary.         DO NITO Brady/[email or web address]  D:  01/12/2020 10:53  T:  01/13/2020 1:32  JOB #:  [id number]

## 2020-09-09 ENCOUNTER — HOSPITAL ENCOUNTER (OUTPATIENT)
Dept: GENERAL RADIOLOGY | Age: 75
Discharge: HOME | End: 2020-09-09
Attending: COLON & RECTAL SURGERY
Payer: MEDICARE

## 2020-09-09 DIAGNOSIS — K59.02 OUTLET DYSFUNCTION CONSTIPATION: ICD-10-CM

## 2020-09-09 PROCEDURE — 74270 X-RAY XM COLON 1CNTRST STD: CPT

## 2020-09-09 PROCEDURE — 74011000255 HC RX REV CODE- 255: Performed by: COLON & RECTAL SURGERY

## 2020-09-09 RX ADMIN — BARIUM SULFATE 176 G: 960 POWDER, FOR SUSPENSION ORAL at 08:08

## 2020-09-09 RX ADMIN — BARIUM SULFATE 230 ML: 400 PASTE ORAL at 09:01

## 2020-10-15 ENCOUNTER — APPOINTMENT (OUTPATIENT)
Dept: CT IMAGING | Age: 75
End: 2020-10-15
Attending: PHYSICIAN ASSISTANT
Payer: MEDICARE

## 2020-10-15 ENCOUNTER — HOSPITAL ENCOUNTER (EMERGENCY)
Age: 75
Discharge: HOME | End: 2020-10-15
Attending: EMERGENCY MEDICINE
Payer: MEDICARE

## 2020-10-15 VITALS
DIASTOLIC BLOOD PRESSURE: 73 MMHG | RESPIRATION RATE: 18 BRPM | HEIGHT: 59 IN | BODY MASS INDEX: 20.96 KG/M2 | OXYGEN SATURATION: 100 % | WEIGHT: 104 LBS | SYSTOLIC BLOOD PRESSURE: 116 MMHG | TEMPERATURE: 98.3 F | HEART RATE: 75 BPM

## 2020-10-15 DIAGNOSIS — R51.9 NONINTRACTABLE EPISODIC HEADACHE, UNSPECIFIED HEADACHE TYPE: Primary | ICD-10-CM

## 2020-10-15 PROCEDURE — 99283 EMERGENCY DEPT VISIT LOW MDM: CPT

## 2020-10-15 PROCEDURE — 70450 CT HEAD/BRAIN W/O DYE: CPT

## 2020-10-15 RX ORDER — MULTIVIT WITH MINERALS/HERBS
1 TABLET ORAL DAILY
COMMUNITY

## 2020-10-15 RX ORDER — LANOLIN ALCOHOL/MO/W.PET/CERES
1000 CREAM (GRAM) TOPICAL DAILY
COMMUNITY

## 2020-10-15 NOTE — ED PROVIDER NOTES
Well-developed negative Dr. Karey Arnett    Date: 10/15/2020  Patient Name: Jerry Ascencio    History of Presenting Illness     Chief Complaint   Patient presents with    Headache         History Provided By: Patient and Patient's     Chief Complaint: HA  Duration: 4 Days  Timing:  Waxing and Waning  Location: rt side of head  Quality: Dull  Severity: Mild  Modifying Factors: none  Associated Symptoms: denies any other associated signs or symptoms      Additional History (Context): Jerry Ascencio is a 76 y.o. female with No significant past medical history who presents ambulatory accompanied by  with c/o intermittent headaches to right parietal head of gradual onset and progression x 4 days. She states being advised by Dr. Monico Romberg to report to ER for CT head related to headache. Patient's  states patient has been experiencing memory issues w/no further details provided. Pt denies FCNV, chills, change in appetite or activity level, neck pain/stiffness, sinus pain, weakness, numbness, dizziness, vision changes. PCP: Izzy Gaspar MD    Current Outpatient Medications   Medication Sig Dispense Refill    cyanocobalamin 1,000 mcg tablet Take 1,000 mcg by mouth daily.  b complex vitamins tablet Take 1 Tab by mouth daily.          Past History     Past Medical History:  Past Medical History:   Diagnosis Date    Hiatal hernia     IBS (irritable bowel syndrome)        Past Surgical History:  Past Surgical History:   Procedure Laterality Date    COLONOSCOPY N/A 9/10/2019    COLONOSCOPY performed by Tracy Landon MD at [facility] ENDOSCOPY    HX [address]      together with tummy tuck     HX HYSTERECTOMY      HX KNEE REPLACEMENT Right 04/17/2018       Family History:  Family History   Problem Relation Age of Onset    Heart Attack Mother 79    Heart Attack Father 79    Other Brother 48        Mesothelioma       Social History:  Social History Tobacco Use    Smoking status: Never Smoker    Smokeless tobacco: Never Used   Substance Use Topics    Alcohol use: Not Currently     Alcohol/week: 0.8 standard drinks     Types: 1 Glasses of wine per week    Drug use: No       Allergies:  No Known Allergies      Review of Systems   Review of Systems   Constitutional: Negative for activity change, appetite change, chills and fever. HENT: Negative for ear pain, postnasal drip, rhinorrhea, sinus pressure, sinus pain and trouble swallowing. Eyes: Negative for visual disturbance. Respiratory: Negative for chest tightness and shortness of breath. Cardiovascular: Negative for chest pain and palpitations. Gastrointestinal: Negative for abdominal pain, constipation (sometimes but not now), diarrhea, nausea and vomiting. Genitourinary: Negative for difficulty urinating. Musculoskeletal: Negative for gait problem, neck pain and neck stiffness. Skin: Negative for rash. Neurological: Positive for headaches (RT-sided, intermittent). Negative for dizziness, syncope, weakness and light-headedness. Hematological: Negative for adenopathy. Psychiatric/Behavioral: Negative for agitation and confusion. The patient is not nervous/anxious. Memory issues   All other systems reviewed and are negative. All Other Systems Negative  Physical Exam     Vitals:    10/15/20 1819   BP: 116/73   Pulse: 75   Resp: 18   Temp: 98.3 °F (36.8 °C)   SpO2: 100%   Weight: 47.2 kg (104 lb)   Height: 4' 11\" (1.499 m)     Physical Exam  Vitals signs and nursing note reviewed. Constitutional:       General: She is not in acute distress. Appearance: She is well-developed. She is not toxic-appearing or diaphoretic. HENT:      Head: Normocephalic and atraumatic. Jaw: No trismus, tenderness or pain on movement. Comments: No sinus tenderness     Nose: Nose normal.      Mouth/Throat:      Pharynx: Uvula midline.    Eyes:      Extraocular Movements: Extraocular movements intact. Conjunctiva/sclera: Conjunctivae normal.      Pupils: Pupils are equal, round, and reactive to light. Comments: No periorbital findings   Neck:      Musculoskeletal: Normal range of motion and neck supple. No neck rigidity or muscular tenderness. Vascular: No carotid bruit. Cardiovascular:      Rate and Rhythm: Normal rate and regular rhythm. Heart sounds: Normal heart sounds. Pulmonary:      Effort: Pulmonary effort is normal.      Breath sounds: Normal breath sounds. Abdominal:      General: Bowel sounds are normal.      Palpations: Abdomen is soft. Musculoskeletal: Normal range of motion. Lymphadenopathy:      Cervical: No cervical adenopathy. Skin:     General: Skin is warm and dry. Capillary Refill: Capillary refill takes less than 2 seconds. Neurological:      Mental Status: She is alert and oriented to person, place, and time. GCS: GCS eye subscore is 4. GCS verbal subscore is 5. GCS motor subscore is 6. Cranial Nerves: Cranial nerves are intact. Sensory: Sensation is intact. Motor: Motor function is intact. No weakness. Coordination: Coordination is intact. Finger-Nose-Finger Test normal.      Gait: Gait is intact. Deep Tendon Reflexes: Reflexes are normal and symmetric. Reflex Scores:       Brachioradialis reflexes are 2+ on the right side and 2+ on the left side. Patellar reflexes are 2+ on the right side and 2+ on the left side. Comments: Neg pronator drift                Diagnostic Study Results     Labs -   No results found for this or any previous visit (from the past 12 hour(s)). Radiologic Studies -   CT HEAD WO CONT   Final Result   IMPRESSION:      No acute infarct, mass, nor hemorrhage. Atrophy. Minimal Small vessel disease. Report provided to the emergency department at 1959 hrs.         CT Results  (Last 48 hours)               10/15/20 1922  CT HEAD WO CONT Final result Impression:  IMPRESSION:       No acute infarct, mass, nor hemorrhage. Atrophy. Minimal Small vessel disease. Report provided to the emergency department at 1959 hrs. Narrative:  EXAM: CT HEAD WITHOUT CONTRAST. CLINICAL HISTORY/INDICATION:  headaches right parietal x4 days, memory loss       COMPARISON: None. TECHNIQUE:   No IV contrast administered. 5 mm thick slices were obtained from skull base to   vertex. Coronal and sagittal reformations obtained. All CT scans at this facility are performed using dose optimization technique as   appropriate to a performed exam, to include automated exposure control,   adjustment of the mA and/or kV according to patient's size (including   appropriate matching for site-specific examinations), or use of iterative   reconstruction technique. FINDINGS:       There are no intra or extra axial fluid collections. There is no hemorrhage. There is mild global enlargement of the ventricular system, cortical sulci, and   basilar cisterns. Minimal Periventricular low density changes are seen bilaterally. The paranasal sinuses are clear. CXR Results  (Last 48 hours)    None            Medical Decision Making   I am the first provider for this patient. I reviewed the vital signs, available nursing notes, past medical history, past surgical history, family history and social history. Vital Signs-Reviewed the patient's vital signs.       Pulse Oximetry Analysis - 100% on RA    Records Reviewed: Nursing Notes and Old Medical Records    Procedures:  Procedures    Provider Notes (Medical Decision Making):     IMPRESSION/PLAN:  8:10 PM    Non-toxic pt to ED with HA of gradual onset and progression, headaches are intermittent, exam is reassuring, CNI, sensorimotor intact, NVI, no sinus pain, no meningismus, no intervention in ED needed as the patient was pain-free, head CT with some age-related findings but no acute findings suggesting head emergencies, no further w/u indicated, will dc with f/u with her PCP. Discussed results, care in ED and further care, f/u and s/s warranting return to ED. Pt and  present understood and agreed to plan. SAUMYA Hsu      MED RECONCILIATION:  No current facility-administered medications for this encounter. Current Outpatient Medications   Medication Sig    cyanocobalamin 1,000 mcg tablet Take 1,000 mcg by mouth daily.  b complex vitamins tablet Take 1 Tab by mouth daily. Disposition:  home    DISCHARGE NOTE:     Pt has been reexamined. Patient has no new complaints, changes, or physical findings. Care plan outlined and precautions discussed. Results of head CT were reviewed with the patient. All medications were reviewed with the patient; will d/c home. All of pt's questions and concerns were addressed. Patient was instructed and agrees to follow up with PCP, as well as to return to the ED upon further deterioration. Patient is ready to go home. Follow-up Information     Follow up With Specialties Details Why Contact Info    Metro Chamber, MD Internal Medicine In 2 days for recheck of current symptoms [address] [facility] [address] [facility]      [id number] [facility] EMERGENCY DEPT Emergency Medicine  As needed, If symptoms worsen [address] [facility]  [phone]          Current Discharge Medication List          Diagnosis     Clinical Impression:   1. Nonintractable episodic headache, unspecified headache type          Dictation disclaimer:  Please note that this dictation was completed with [employer], the computer voice recognition software. Quite often unanticipated grammatical, syntax, homophones, and other interpretive errors are inadvertently transcribed by the computer software. Please disregard these errors. Please excuse any errors that have escaped final proofreading.

## 2020-10-15 NOTE — ED TRIAGE NOTES
Patient c/o headache to right parietal head x 4 days. She states being advised by Dr. Toni Bolivar to report to ER for CT head related to headache. Patient's  states patient has been experiencing memory issues.

## 2020-10-16 NOTE — ROUTINE PROCESS
Maria M Santana is a 76 y.o. female that was discharged in stable. Pt was accompanied by spouse. Pt is not driving. The patients diagnosis, condition and treatment were explained to  patient and aftercare instructions were given. The patient verbalized understanding. Patient armband removed and shredded.

## 2020-10-19 ENCOUNTER — TRANSCRIBE ORDER (OUTPATIENT)
Dept: SCHEDULING | Age: 75
End: 2020-10-19

## 2020-10-19 DIAGNOSIS — G46.3 BRAIN STEM STROKE SYNDROME: Primary | ICD-10-CM

## 2020-10-22 ENCOUNTER — HOSPITAL ENCOUNTER (OUTPATIENT)
Age: 75
Discharge: HOME | End: 2020-10-22
Attending: INTERNAL MEDICINE
Payer: MEDICARE

## 2020-10-22 DIAGNOSIS — G46.3 BRAIN STEM STROKE SYNDROME: ICD-10-CM

## 2020-10-22 LAB — CREAT UR-MCNC: 0.6 MG/DL (ref 0.6–1.3)

## 2020-10-22 PROCEDURE — 82565 ASSAY OF CREATININE: CPT

## 2020-10-22 PROCEDURE — 74011636320 HC RX REV CODE- 636/320: Performed by: INTERNAL MEDICINE

## 2020-10-22 PROCEDURE — A9575 INJ GADOTERATE MEGLUMI 0.1ML: HCPCS | Performed by: INTERNAL MEDICINE

## 2020-10-22 PROCEDURE — 70551 MRI BRAIN STEM W/O DYE: CPT

## 2022-02-28 ENCOUNTER — TRANSCRIBE ORDER (OUTPATIENT)
Dept: SCHEDULING | Age: 77
End: 2022-02-28

## 2022-02-28 DIAGNOSIS — I63.033 CEREBRAL INFARCTION DUE TO BILATERAL THROMBOSIS OF CAROTID ARTERIES (HCC): Primary | ICD-10-CM

## 2022-02-28 DIAGNOSIS — G40.019 BENIGN ROLANDIC EPILEPSY, INTRACTABLE (HCC): ICD-10-CM

## 2022-03-19 PROBLEM — R10.9 ABDOMINAL PAIN: Status: ACTIVE | Noted: 2020-01-12

## 2022-03-19 PROBLEM — K44.9 HIATAL HERNIA: Status: ACTIVE | Noted: 2018-08-15

## 2022-03-20 PROBLEM — M17.10 ARTHRITIS OF KNEE: Status: ACTIVE | Noted: 2018-04-17

## 2022-03-20 PROBLEM — K56.609 SBO (SMALL BOWEL OBSTRUCTION) (HCC): Status: ACTIVE | Noted: 2020-01-12

## 2022-04-11 ENCOUNTER — HOSPITAL ENCOUNTER (OUTPATIENT)
Dept: MRI IMAGING | Age: 77
Discharge: HOME | End: 2022-04-11
Payer: MEDICARE

## 2022-04-11 DIAGNOSIS — G40.019 BENIGN ROLANDIC EPILEPSY, INTRACTABLE (HCC): ICD-10-CM

## 2022-04-11 DIAGNOSIS — I63.033 CEREBRAL INFARCTION DUE TO BILATERAL THROMBOSIS OF CAROTID ARTERIES (HCC): ICD-10-CM

## 2022-04-11 PROCEDURE — 70551 MRI BRAIN STEM W/O DYE: CPT

## 2023-09-09 ENCOUNTER — HOSPITAL ENCOUNTER (EMERGENCY)
Facility: HOSPITAL | Age: 78
Discharge: HOME | End: 2023-09-10
Attending: EMERGENCY MEDICINE
Payer: MEDICARE

## 2023-09-09 DIAGNOSIS — F03.918 DEMENTIA WITH BEHAVIORAL DISTURBANCE (HCC): ICD-10-CM

## 2023-09-09 DIAGNOSIS — U07.1 COVID-19: Primary | ICD-10-CM

## 2023-09-09 PROCEDURE — 96374 THER/PROPH/DIAG INJ IV PUSH: CPT

## 2023-09-09 PROCEDURE — 96375 TX/PRO/DX INJ NEW DRUG ADDON: CPT

## 2023-09-09 PROCEDURE — 99285 EMERGENCY DEPT VISIT HI MDM: CPT

## 2023-09-09 PROCEDURE — 96361 HYDRATE IV INFUSION ADD-ON: CPT

## 2023-09-10 ENCOUNTER — APPOINTMENT (OUTPATIENT)
Facility: HOSPITAL | Age: 78
End: 2023-09-10
Payer: MEDICARE

## 2023-09-10 VITALS
SYSTOLIC BLOOD PRESSURE: 104 MMHG | HEART RATE: 83 BPM | WEIGHT: 112 LBS | DIASTOLIC BLOOD PRESSURE: 82 MMHG | OXYGEN SATURATION: 94 % | TEMPERATURE: 99.1 F | RESPIRATION RATE: 16 BRPM

## 2023-09-10 LAB
ALBUMIN SERPL-MCNC: 4 G/DL (ref 3.4–5)
ALBUMIN/GLOB SERPL: 1.3 (ref 0.8–1.7)
ALP SERPL-CCNC: 117 U/L (ref 45–117)
ALT SERPL-CCNC: 24 U/L (ref 13–56)
ANION GAP SERPL CALC-SCNC: 9 MMOL/L (ref 3–18)
APPEARANCE UR: CLEAR
AST SERPL-CCNC: 28 U/L (ref 10–38)
BASOPHILS # BLD: 0.1 K/UL (ref 0–0.1)
BASOPHILS NFR BLD: 1 % (ref 0–2)
BILIRUB SERPL-MCNC: 0.6 MG/DL (ref 0.2–1)
BILIRUB UR QL: NEGATIVE
BUN SERPL-MCNC: 16 MG/DL (ref 7–18)
BUN/CREAT SERPL: 22 (ref 12–20)
CALCIUM SERPL-MCNC: 9 MG/DL (ref 8.5–10.1)
CHLORIDE SERPL-SCNC: 103 MMOL/L (ref 100–111)
CO2 SERPL-SCNC: 24 MMOL/L (ref 21–32)
COLOR UR: YELLOW
CREAT SERPL-MCNC: 0.74 MG/DL (ref 0.6–1.3)
DIFFERENTIAL METHOD BLD: ABNORMAL
EKG ATRIAL RATE: 89 BPM
EKG DIAGNOSIS: NORMAL
EKG P AXIS: 44 DEGREES
EKG P-R INTERVAL: 130 MS
EKG Q-T INTERVAL: 350 MS
EKG QRS DURATION: 84 MS
EKG QTC CALCULATION (BAZETT): 425 MS
EKG R AXIS: 15 DEGREES
EKG T AXIS: 73 DEGREES
EKG VENTRICULAR RATE: 89 BPM
EOSINOPHIL # BLD: 0.1 K/UL (ref 0–0.4)
EOSINOPHIL NFR BLD: 0 % (ref 0–5)
ERYTHROCYTE [DISTWIDTH] IN BLOOD BY AUTOMATED COUNT: 13.1 % (ref 11.6–14.5)
FLUAV RNA SPEC QL NAA+PROBE: NOT DETECTED
FLUBV RNA SPEC QL NAA+PROBE: NOT DETECTED
GLOBULIN SER CALC-MCNC: 3 G/DL (ref 2–4)
GLUCOSE BLD STRIP.AUTO-MCNC: 175 MG/DL (ref 70–110)
GLUCOSE SERPL-MCNC: 143 MG/DL (ref 74–99)
GLUCOSE UR STRIP.AUTO-MCNC: NEGATIVE MG/DL
HCT VFR BLD AUTO: 40.9 % (ref 35–45)
HGB BLD-MCNC: 13.4 G/DL (ref 12–16)
HGB UR QL STRIP: NEGATIVE
IMM GRANULOCYTES # BLD AUTO: 0 K/UL (ref 0–0.04)
IMM GRANULOCYTES NFR BLD AUTO: 0 % (ref 0–0.5)
KETONES UR QL STRIP.AUTO: 15 MG/DL
LACTATE SERPL-SCNC: 1 MMOL/L (ref 0.4–2)
LEUKOCYTE ESTERASE UR QL STRIP.AUTO: NEGATIVE
LYMPHOCYTES # BLD: 1.8 K/UL (ref 0.9–3.6)
LYMPHOCYTES NFR BLD: 16 % (ref 21–52)
MCH RBC QN AUTO: 28.6 PG (ref 24–34)
MCHC RBC AUTO-ENTMCNC: 32.8 G/DL (ref 31–37)
MCV RBC AUTO: 87.2 FL (ref 78–100)
MONOCYTES # BLD: 0.9 K/UL (ref 0.05–1.2)
MONOCYTES NFR BLD: 8 % (ref 3–10)
NEUTS SEG # BLD: 8.7 K/UL (ref 1.8–8)
NEUTS SEG NFR BLD: 76 % (ref 40–73)
NITRITE UR QL STRIP.AUTO: NEGATIVE
NRBC # BLD: 0 K/UL (ref 0–0.01)
NRBC BLD-RTO: 0 PER 100 WBC
PH UR STRIP: 5 (ref 5–8)
PLATELET # BLD AUTO: 231 K/UL (ref 135–420)
PMV BLD AUTO: 9.2 FL (ref 9.2–11.8)
POTASSIUM SERPL-SCNC: 3.9 MMOL/L (ref 3.5–5.5)
PROCALCITONIN SERPL-MCNC: <0.05 NG/ML
PROT SERPL-MCNC: 7 G/DL (ref 6.4–8.2)
PROT UR STRIP-MCNC: NEGATIVE MG/DL
RBC # BLD AUTO: 4.69 M/UL (ref 4.2–5.3)
SARS-COV-2 RNA RESP QL NAA+PROBE: DETECTED
SODIUM SERPL-SCNC: 136 MMOL/L (ref 136–145)
SP GR UR REFRACTOMETRY: 1.01 (ref 1–1.03)
TROPONIN I SERPL HS-MCNC: 4 NG/L (ref 0–54)
UROBILINOGEN UR QL STRIP.AUTO: 0.2 EU/DL (ref 0.2–1)
WBC # BLD AUTO: 11.5 K/UL (ref 4.6–13.2)

## 2023-09-10 PROCEDURE — 70450 CT HEAD/BRAIN W/O DYE: CPT

## 2023-09-10 PROCEDURE — 80053 COMPREHEN METABOLIC PANEL: CPT

## 2023-09-10 PROCEDURE — 84145 PROCALCITONIN (PCT): CPT

## 2023-09-10 PROCEDURE — 96375 TX/PRO/DX INJ NEW DRUG ADDON: CPT

## 2023-09-10 PROCEDURE — 83605 ASSAY OF LACTIC ACID: CPT

## 2023-09-10 PROCEDURE — 6360000002 HC RX W HCPCS: Performed by: EMERGENCY MEDICINE

## 2023-09-10 PROCEDURE — 96374 THER/PROPH/DIAG INJ IV PUSH: CPT

## 2023-09-10 PROCEDURE — 81003 URINALYSIS AUTO W/O SCOPE: CPT

## 2023-09-10 PROCEDURE — 84484 ASSAY OF TROPONIN QUANT: CPT

## 2023-09-10 PROCEDURE — 93010 ELECTROCARDIOGRAM REPORT: CPT | Performed by: INTERNAL MEDICINE

## 2023-09-10 PROCEDURE — 85025 COMPLETE CBC W/AUTO DIFF WBC: CPT

## 2023-09-10 PROCEDURE — 87040 BLOOD CULTURE FOR BACTERIA: CPT

## 2023-09-10 PROCEDURE — 2580000003 HC RX 258: Performed by: EMERGENCY MEDICINE

## 2023-09-10 PROCEDURE — 87636 SARSCOV2 & INF A&B AMP PRB: CPT

## 2023-09-10 PROCEDURE — 71045 X-RAY EXAM CHEST 1 VIEW: CPT

## 2023-09-10 PROCEDURE — 93005 ELECTROCARDIOGRAM TRACING: CPT | Performed by: EMERGENCY MEDICINE

## 2023-09-10 PROCEDURE — 82962 GLUCOSE BLOOD TEST: CPT

## 2023-09-10 RX ORDER — SODIUM CHLORIDE, SODIUM LACTATE, POTASSIUM CHLORIDE, AND CALCIUM CHLORIDE .6; .31; .03; .02 G/100ML; G/100ML; G/100ML; G/100ML
30 INJECTION, SOLUTION INTRAVENOUS ONCE
Status: COMPLETED | OUTPATIENT
Start: 2023-09-10 | End: 2023-09-10

## 2023-09-10 RX ORDER — ONDANSETRON 2 MG/ML
4 INJECTION INTRAMUSCULAR; INTRAVENOUS
Status: COMPLETED | OUTPATIENT
Start: 2023-09-10 | End: 2023-09-10

## 2023-09-10 RX ADMIN — SODIUM CHLORIDE, POTASSIUM CHLORIDE, SODIUM LACTATE AND CALCIUM CHLORIDE 1800 ML: 600; 310; 30; 20 INJECTION, SOLUTION INTRAVENOUS at 00:18

## 2023-09-10 RX ADMIN — ONDANSETRON 4 MG: 2 INJECTION INTRAMUSCULAR; INTRAVENOUS at 00:19

## 2023-09-10 RX ADMIN — WATER 2000 MG: 1 INJECTION INTRAMUSCULAR; INTRAVENOUS; SUBCUTANEOUS at 00:19

## 2023-09-10 ASSESSMENT — ENCOUNTER SYMPTOMS
CHEST TIGHTNESS: 0
ABDOMINAL PAIN: 0
EYES NEGATIVE: 1

## 2023-09-10 NOTE — DISCHARGE INSTRUCTIONS
Demetrice's COVID test was positive and we would like to start her on Paxlovid. She will need to stop the Seroquel on a while on the Paxlovid but can continue the Namenda and the Lexapro. Give Tylenol for fever and keep her well-hydrated. Please return if she is at all worsened or concerned.

## 2023-09-10 NOTE — ED TRIAGE NOTES
Patient in ED via EMS. Per EMS patient has a history of dementia, but has been worse today. Patient was febrile and tachycardic.

## 2023-09-10 NOTE — ED NOTES
Patient restless in bed , patient continues to pull on lines and lead.       Josesito Steinberg RN  09/10/23 9244

## 2023-09-10 NOTE — ED NOTES
Patient is discharged at this time , patient has been given discharge instructions. D/C paperwork and been explained and questions have been answered at this time. IV removed at this time.        Mira Frazier RN  09/10/23 3478

## 2023-09-16 LAB
BACTERIA SPEC CULT: NORMAL
BACTERIA SPEC CULT: NORMAL
SERVICE CMNT-IMP: NORMAL
SERVICE CMNT-IMP: NORMAL

## 2023-09-19 ENCOUNTER — HOSPITAL ENCOUNTER (EMERGENCY)
Facility: HOSPITAL | Age: 78
Discharge: HOME | End: 2023-09-19
Attending: EMERGENCY MEDICINE
Payer: MEDICARE

## 2023-09-19 ENCOUNTER — APPOINTMENT (OUTPATIENT)
Facility: HOSPITAL | Age: 78
End: 2023-09-19
Payer: MEDICARE

## 2023-09-19 VITALS
RESPIRATION RATE: 22 BRPM | OXYGEN SATURATION: 93 % | HEART RATE: 86 BPM | DIASTOLIC BLOOD PRESSURE: 90 MMHG | SYSTOLIC BLOOD PRESSURE: 117 MMHG | TEMPERATURE: 97.6 F | WEIGHT: 119 LBS

## 2023-09-19 DIAGNOSIS — R53.1 GENERALIZED WEAKNESS: ICD-10-CM

## 2023-09-19 DIAGNOSIS — R53.81 DEBILITY: ICD-10-CM

## 2023-09-19 DIAGNOSIS — E87.6 ACUTE HYPOKALEMIA: Primary | ICD-10-CM

## 2023-09-19 DIAGNOSIS — T88.7XXA MEDICATION SIDE EFFECT: ICD-10-CM

## 2023-09-19 LAB
ALBUMIN SERPL-MCNC: 3.5 G/DL (ref 3.4–5)
ALBUMIN/GLOB SERPL: 1.2 (ref 0.8–1.7)
ALP SERPL-CCNC: 85 U/L (ref 45–117)
ALT SERPL-CCNC: 26 U/L (ref 13–56)
ANION GAP SERPL CALC-SCNC: 4 MMOL/L (ref 3–18)
ANION GAP SERPL CALC-SCNC: 9 MMOL/L (ref 3–18)
APPEARANCE UR: CLEAR
AST SERPL-CCNC: 25 U/L (ref 10–38)
BACTERIA URNS QL MICRO: NEGATIVE /HPF
BASOPHILS # BLD: 0 K/UL (ref 0–0.1)
BASOPHILS NFR BLD: 0 % (ref 0–2)
BILIRUB SERPL-MCNC: 0.4 MG/DL (ref 0.2–1)
BILIRUB UR QL: NEGATIVE
BUN SERPL-MCNC: 10 MG/DL (ref 7–18)
BUN SERPL-MCNC: 12 MG/DL (ref 7–18)
BUN/CREAT SERPL: 17 (ref 12–20)
BUN/CREAT SERPL: 22 (ref 12–20)
CALCIUM SERPL-MCNC: 8.3 MG/DL (ref 8.5–10.1)
CALCIUM SERPL-MCNC: 9.1 MG/DL (ref 8.5–10.1)
CHLORIDE SERPL-SCNC: 101 MMOL/L (ref 100–111)
CHLORIDE SERPL-SCNC: 106 MMOL/L (ref 100–111)
CO2 SERPL-SCNC: 29 MMOL/L (ref 21–32)
CO2 SERPL-SCNC: 32 MMOL/L (ref 21–32)
COLOR UR: YELLOW
CREAT SERPL-MCNC: 0.46 MG/DL (ref 0.6–1.3)
CREAT SERPL-MCNC: 0.71 MG/DL (ref 0.6–1.3)
DIFFERENTIAL METHOD BLD: NORMAL
EOSINOPHIL # BLD: 0.1 K/UL (ref 0–0.4)
EOSINOPHIL NFR BLD: 1 % (ref 0–5)
EPITH CASTS URNS QL MICRO: NEGATIVE /LPF (ref 0–5)
ERYTHROCYTE [DISTWIDTH] IN BLOOD BY AUTOMATED COUNT: 12.5 % (ref 11.6–14.5)
GLOBULIN SER CALC-MCNC: 3 G/DL (ref 2–4)
GLUCOSE BLD STRIP.AUTO-MCNC: 134 MG/DL (ref 70–110)
GLUCOSE SERPL-MCNC: 120 MG/DL (ref 74–99)
GLUCOSE SERPL-MCNC: 93 MG/DL (ref 74–99)
GLUCOSE UR STRIP.AUTO-MCNC: NEGATIVE MG/DL
HCT VFR BLD AUTO: 40.3 % (ref 35–45)
HGB BLD-MCNC: 13.5 G/DL (ref 12–16)
HGB UR QL STRIP: NEGATIVE
IMM GRANULOCYTES # BLD AUTO: 0 K/UL (ref 0–0.04)
IMM GRANULOCYTES NFR BLD AUTO: 0 % (ref 0–0.5)
KETONES UR QL STRIP.AUTO: 40 MG/DL
LACTATE SERPL-SCNC: 1 MMOL/L (ref 0.4–2)
LEUKOCYTE ESTERASE UR QL STRIP.AUTO: ABNORMAL
LYMPHOCYTES # BLD: 1.6 K/UL (ref 0.9–3.6)
LYMPHOCYTES NFR BLD: 22 % (ref 21–52)
MAGNESIUM SERPL-MCNC: 2.3 MG/DL (ref 1.6–2.6)
MCH RBC QN AUTO: 28.4 PG (ref 24–34)
MCHC RBC AUTO-ENTMCNC: 33.5 G/DL (ref 31–37)
MCV RBC AUTO: 84.8 FL (ref 78–100)
MONOCYTES # BLD: 0.6 K/UL (ref 0.05–1.2)
MONOCYTES NFR BLD: 9 % (ref 3–10)
NEUTS SEG # BLD: 4.7 K/UL (ref 1.8–8)
NEUTS SEG NFR BLD: 67 % (ref 40–73)
NITRITE UR QL STRIP.AUTO: NEGATIVE
NRBC # BLD: 0 K/UL (ref 0–0.01)
NRBC BLD-RTO: 0 PER 100 WBC
PH UR STRIP: 5.5 (ref 5–8)
PLATELET # BLD AUTO: 330 K/UL (ref 135–420)
PMV BLD AUTO: 9.4 FL (ref 9.2–11.8)
POTASSIUM SERPL-SCNC: 2.8 MMOL/L (ref 3.5–5.5)
POTASSIUM SERPL-SCNC: 3 MMOL/L (ref 3.5–5.5)
PROT SERPL-MCNC: 6.5 G/DL (ref 6.4–8.2)
PROT UR STRIP-MCNC: NEGATIVE MG/DL
RBC # BLD AUTO: 4.75 M/UL (ref 4.2–5.3)
RBC #/AREA URNS HPF: NEGATIVE /HPF (ref 0–5)
SODIUM SERPL-SCNC: 139 MMOL/L (ref 136–145)
SODIUM SERPL-SCNC: 142 MMOL/L (ref 136–145)
SP GR UR REFRACTOMETRY: 1.01 (ref 1–1.03)
UROBILINOGEN UR QL STRIP.AUTO: 0.2 EU/DL (ref 0.2–1)
WBC # BLD AUTO: 7.1 K/UL (ref 4.6–13.2)
WBC URNS QL MICRO: NORMAL /HPF (ref 0–4)

## 2023-09-19 PROCEDURE — 83605 ASSAY OF LACTIC ACID: CPT

## 2023-09-19 PROCEDURE — 96360 HYDRATION IV INFUSION INIT: CPT

## 2023-09-19 PROCEDURE — 71045 X-RAY EXAM CHEST 1 VIEW: CPT

## 2023-09-19 PROCEDURE — 6370000000 HC RX 637 (ALT 250 FOR IP): Performed by: EMERGENCY MEDICINE

## 2023-09-19 PROCEDURE — 99284 EMERGENCY DEPT VISIT MOD MDM: CPT

## 2023-09-19 PROCEDURE — 83735 ASSAY OF MAGNESIUM: CPT

## 2023-09-19 PROCEDURE — 2580000003 HC RX 258: Performed by: EMERGENCY MEDICINE

## 2023-09-19 PROCEDURE — 80053 COMPREHEN METABOLIC PANEL: CPT

## 2023-09-19 PROCEDURE — 81001 URINALYSIS AUTO W/SCOPE: CPT

## 2023-09-19 PROCEDURE — 96361 HYDRATE IV INFUSION ADD-ON: CPT

## 2023-09-19 PROCEDURE — 85025 COMPLETE CBC W/AUTO DIFF WBC: CPT

## 2023-09-19 PROCEDURE — 82962 GLUCOSE BLOOD TEST: CPT

## 2023-09-19 RX ORDER — POTASSIUM CHLORIDE 20 MEQ/1
20 TABLET, EXTENDED RELEASE ORAL 2 TIMES DAILY
Qty: 14 TABLET | Refills: 0 | Status: SHIPPED | OUTPATIENT
Start: 2023-09-19 | End: 2023-09-26

## 2023-09-19 RX ORDER — 0.9 % SODIUM CHLORIDE 0.9 %
1000 INTRAVENOUS SOLUTION INTRAVENOUS ONCE
Status: COMPLETED | OUTPATIENT
Start: 2023-09-19 | End: 2023-09-19

## 2023-09-19 RX ADMIN — POTASSIUM BICARBONATE 40 MEQ: 782 TABLET, EFFERVESCENT ORAL at 06:20

## 2023-09-19 RX ADMIN — POTASSIUM BICARBONATE 40 MEQ: 782 TABLET, EFFERVESCENT ORAL at 18:12

## 2023-09-19 RX ADMIN — SODIUM CHLORIDE 1000 ML: 900 INJECTION, SOLUTION INTRAVENOUS at 04:36

## 2023-09-19 ASSESSMENT — PAIN - FUNCTIONAL ASSESSMENT: PAIN_FUNCTIONAL_ASSESSMENT: NONE - DENIES PAIN

## 2023-09-19 NOTE — ED NOTES
Assumed care of patient. Patient resting in bed with eyes closed. Even rise and fall of chest noted. NAD noted at this time.  at bedside.       Nicole Dukes RN  09/19/23 6871

## 2023-09-19 NOTE — ED NOTES
Patient verbalizes having to urinate. Assisted patient to bedside commode. No urine output noted.       Mary Sarabia RN  09/19/23 0942

## 2023-09-19 NOTE — ACP (ADVANCE CARE PLANNING)
Advance Care Planning     Advance Care Planning Inpatient Note  [facility] Department    Today's Date: 9/19/2023  Unit: [facility] EMERGENCY DEPT    Received request from . Upon review of chart and communication with care team, patient's decision making abilities are not in question. . Patient was/were present in the room during visit. Goals of ACP Conversation:  Discuss advance care planning documents    Health Care Decision Makers:     No healthcare decision makers have been documented. Click here to complete [address] including selection of the Healthcare Decision Maker Relationship (ie \"Primary\")  Summary:  No Decision Maker named by patient at this time    Advance Care Planning Documents (Patient Wishes):  None     Assessment:  Patient seen in bed 3 of the emergency room where she will probably be admitted to the hospital from Patient is not responsive and has family member setting at bedside who is hoping patient will be going home. There is no advance directive present in her file.     Interventions:  Patient DECLINED ACP conversation    Care Preferences Communicated:   No    Outcomes/Plan:      Electronically signed by Sasha Hui., [facility] on 9/19/2023 at 10:08 AM

## 2023-09-19 NOTE — ED NOTES
Breakfast tray offered to patient. Nain Almonte was able to feed patient 2 bites of eggs, 1 bite of thakkar, and 1 bite of a biscuit. Patient requires redirection and encouragement during meals. Spouse to continue to feed patient.       Patrick Alcantara RN  09/19/23 5774

## 2023-09-19 NOTE — ED NOTES
Patient continues to rest with eyes closed in hospital bed in NAD.  at bedside.       Bhanu Brand RN  09/19/23 1300

## 2023-09-19 NOTE — PROGRESS NOTES
PT orders received and chart was reviewed. Attempted to see patient for evaluation however she was very lethargic and did not arouse to voice or extremities being moved. Will continue to follow and re-attempt when patient is more alert.   Bianca Dupont, PT

## 2023-09-19 NOTE — ED PROVIDER NOTES
09/19/23 0410 -- 60 15 -- 98 %   09/19/23 0405 -- -- -- (!) 88/51 97 %         Provider Notes (Medical Decision Making):   ED Course as of 09/19/23 0611   Tue Sep 19, 2023   8647 Urinalysis not suggestive of urinary tract infection. Serum lactic acid level within normal limits, blood pressure improving with IV fluid bolus. [JM]   0600 We will replace low K+ with 40 meq of Effer-K []      ED Course User Index  [JM] Perez Safe, DO       Initial Differential Diagnosis: Medication side effect from Seroquel, dehydration, urinary tract infection, acute kidney injury, acute COVID infection. We we will obtain screening laboratory studies, chest x-ray to evaluate for pneumonia, urinalysis to eval for UTI. Would not repeat CT imaging as no report of head trauma, ground-level fall or loss of consciousness, recent CT imaging obtained 1 week ago. Patient is afebrile, low normal blood pressure. Will administer 1 L IV fluid bolus. Will consult  for assistance with arranging home health services versus placement in assisted living facility versus SNF. Clinical presentation most consistent with ED, drowsiness likely secondary to Seroquel, possibly also superimposed by hypokalemia which we will replace and recheck in the ED. No evidence of UTI, does not meet SIRS criteria. After the IV fluid bolus, blood pressure improved as well as mental status per the . States she woke up briefly during the obtaining of the urinalysis and acting appropriate for her baseline. No admittable diagnosis found at this point unless the potassium level is not improving. Have consulted PT to evaluate the patient as well as case management to discuss home health options versus SNF placement with the  and patient. Documentation/Prior Results Review:  Nursing notes    Social Determinants of Health: None identified    Is this patient to be included in the SEP-1 core measure due to severe sepsis or septic shock?

## 2023-09-19 NOTE — ED TRIAGE NOTES
Patient in ED via EMS. Per EMS patient's  began giving the patient their Seroquel after not giving it to her for awhile and patient is very drowsy.

## 2023-09-19 NOTE — ED NOTES
Discharge planner concerned about pt's qualifications. Resting comfortable on stretcher. Will continue to monitor.       Joyce Roberson RN  09/19/23 1965

## 2023-09-19 NOTE — ED NOTES
393 ml noted on bladder scan. Pt verbalizes having the urge to urinate. Patient assisted to bedside commode. Patient noted to be voiding on the floor before sitting on the bedside commode. 200 ml noted in bed shannon.       Eloisa Torres RN  09/19/23 5102

## (undated) DEVICE — SMARTSLEEVE SURGICAL GOWN, 3XL LONG: Brand: CONVERTORS

## (undated) DEVICE — NEEDLE NRV BLK 21GA L4IN 30DEG INSUL BVL EXTN SET STIMUPLEX

## (undated) DEVICE — KIT CLN UP BON SECOURS MARYV

## (undated) DEVICE — AIRLIFE™ NASAL OXYGEN CANNULA CURVED, NONFLARED TIP WITH 14 FOOT (4.3 M) CRUSH-RESISTANT TUBING, OVER-THE-EAR STYLE: Brand: AIRLIFE™

## (undated) DEVICE — STRYKER PERFORMANCE SERIES SAGITTAL BLADE: Brand: STRYKER PERFORMANCE SERIES

## (undated) DEVICE — 3M™ TEGADERM™ TRANSPARENT FILM DRESSING FRAME STYLE, 1626W, 4 IN X 4-3/4 IN (10 CM X 12 CM), 50/CT 4CT/CASE: Brand: 3M™ TEGADERM™

## (undated) DEVICE — BOWL AND CEMENT CARTRIDGE WITH BREAKAWAY FEMORAL NOZZLE: Brand: ACM

## (undated) DEVICE — 3M™ STERI-DRAPE™ INSTRUMENT POUCH 1018: Brand: STERI-DRAPE™

## (undated) DEVICE — MEDI-VAC NON-CONDUCTIVE SUCTION TUBING: Brand: CARDINAL HEALTH

## (undated) DEVICE — Z DISCONTINUED USE 2744636  DRESSING AQUACEL 14 IN ALG W3.5XL14IN POLYUR FLM CVR W/ HYDRCOLL

## (undated) DEVICE — REM POLYHESIVE ADULT PATIENT RETURN ELECTRODE: Brand: VALLEYLAB

## (undated) DEVICE — SOLUTION IV 1000ML 0.9% SOD CHL

## (undated) DEVICE — (D)SYR 10ML 1/5ML GRAD NSAF -- PKGING CHANGE USE ITEM 338027

## (undated) DEVICE — T5 HOOD WITH PEEL AWAY FACE SHIELD

## (undated) DEVICE — ELASTIC BANDAGE 6": Brand: CARDINAL HEALTH

## (undated) DEVICE — CATH IV SAFE STR 22GX1IN BLU -- PROTECTIV PLUS

## (undated) DEVICE — PACK SURG BSHR TOT KNEE LF

## (undated) DEVICE — BIPOLAR SEALER 23-112-1 AQM 6.0: Brand: AQUAMANTYS ®

## (undated) DEVICE — DRSG PATCH ANTIMIC 1INX4.0MM -- CONVERT TO ITEM 356053

## (undated) DEVICE — X-RAY SPONGES,12 PLY: Brand: DERMACEA

## (undated) DEVICE — HANDPIECE SET WITH HIGH FLOW TIP AND SUCTION TUBE: Brand: INTERPULSE

## (undated) DEVICE — SUTURE VCRL SZ 2 L27IN ABSRB VLT L65MM TP-1 1/2 CIR J649G

## (undated) DEVICE — 3M™ BAIR PAWS FLEX™ WARMING GOWN, STANDARD, 20 PER CASE 81003: Brand: BAIR PAWS™

## (undated) DEVICE — SOFT SILICONE HYDROCELLULAR SACRUM DRESSING WITH LOCK AWAY LAYER: Brand: ALLEVYN LIFE SACRUM (LARGE) PACK OF 10

## (undated) DEVICE — UNIT THER SEMI CLS LOOP RECIRC SYS W/ UNIV WRP ON PD ICEMAN

## (undated) DEVICE — 4-PORT MANIFOLD: Brand: NEPTUNE 2

## (undated) DEVICE — JP 3-SPRING RES W/15FR PVC DRAIN/TR: Brand: CARDINAL HEALTH

## (undated) DEVICE — NEEDLE HYPO 21GA L1.5IN INTRAMUSCULAR S STL LATCH BVL UP

## (undated) DEVICE — SLIM BODY SKIN STAPLER: Brand: APPOSE ULC

## (undated) DEVICE — INTENDED FOR TISSUE SEPARATION, AND OTHER PROCEDURES THAT REQUIRE A SHARP SURGICAL BLADE TO PUNCTURE OR CUT.: Brand: BARD-PARKER ® STAINLESS STEEL BLADES

## (undated) DEVICE — GOWN,REINFORCED,POLY,AURORA,XXLARGE,STR: Brand: MEDLINE

## (undated) DEVICE — BLADE RMFG DBL RECIP DBL SD --

## (undated) DEVICE — SOLUTION IRRIG 3000ML 0.9% SOD CHL FLX CONT 0797208] ICU MEDICAL INC]

## (undated) DEVICE — DISPOSABLE TOURNIQUET CUFF SINGLE BLADDER, DUAL PORT AND QUICK CONNECT CONNECTOR: Brand: COLOR CUFF

## (undated) DEVICE — SKIN MARKER,REGULAR TIP WITH RULER AND LABELS: Brand: DEVON

## (undated) DEVICE — SUTURE VCRL SZ 0 L36IN ABSRB UD L36MM CT-1 1/2 CIR J946H

## (undated) DEVICE — Device

## (undated) DEVICE — SYRINGE MED 3ML NDL 22GA L1 1/2IN REG BVL SFGLDE

## (undated) DEVICE — COVER LT HNDL BLU STRL -- MEDICHOICE

## (undated) DEVICE — Z DISCONTINUED BY MEDLINE USE 2711682 TRAY SKIN PREP DRY W/ PREM GLV

## (undated) DEVICE — SYR LR LCK 1ML GRAD NSAF 30ML --

## (undated) DEVICE — Z CONVERTED USE 2276507 CONNECTOR TBNG POLYPR 5IN1 TOUGH SHATTERPROOF CLN FOR

## (undated) DEVICE — PREP SKN CHLRAPRP APPL 10.5ML --

## (undated) DEVICE — INTENDED FOR TISSUE SEPARATION, AND OTHER PROCEDURES THAT REQUIRE A SHARP SURGICAL BLADE TO PUNCTURE OR CUT.: Brand: BARD-PARKER SAFETY BLADES SIZE 10, STERILE

## (undated) DEVICE — NEEDLE HYPO 18GA L1.5IN PNK S STL HUB POLYPR SHLD REG BVL

## (undated) DEVICE — SUTURE MCRYL SZ 2-0 L36IN ABSRB UD L36MM CT-1 1/2 CIR Y945H

## (undated) DEVICE — FLEX ADVANTAGE 1500CC: Brand: FLEX ADVANTAGE